# Patient Record
Sex: MALE | Race: BLACK OR AFRICAN AMERICAN | Employment: UNEMPLOYED | URBAN - METROPOLITAN AREA
[De-identification: names, ages, dates, MRNs, and addresses within clinical notes are randomized per-mention and may not be internally consistent; named-entity substitution may affect disease eponyms.]

---

## 2017-01-02 RX ORDER — BLOOD SUGAR DIAGNOSTIC
STRIP MISCELLANEOUS
Qty: 100 STRIP | Refills: 5 | Status: SHIPPED | OUTPATIENT
Start: 2017-01-02 | End: 2018-01-22 | Stop reason: SDUPTHER

## 2017-01-04 ENCOUNTER — OFFICE VISIT (OUTPATIENT)
Dept: INTERNAL MEDICINE CLINIC | Age: 42
End: 2017-01-04

## 2017-01-04 VITALS
BODY MASS INDEX: 44.1 KG/M2 | WEIGHT: 315 LBS | HEIGHT: 71 IN | DIASTOLIC BLOOD PRESSURE: 80 MMHG | SYSTOLIC BLOOD PRESSURE: 120 MMHG | HEART RATE: 102 BPM | TEMPERATURE: 99.3 F | RESPIRATION RATE: 18 BRPM | OXYGEN SATURATION: 98 %

## 2017-01-04 DIAGNOSIS — I10 ESSENTIAL HYPERTENSION: ICD-10-CM

## 2017-01-04 DIAGNOSIS — G47.33 OSA (OBSTRUCTIVE SLEEP APNEA): ICD-10-CM

## 2017-01-04 DIAGNOSIS — E78.5 DYSLIPIDEMIA, GOAL LDL BELOW 100: ICD-10-CM

## 2017-01-04 RX ORDER — DULOXETIN HYDROCHLORIDE 30 MG/1
30 CAPSULE, DELAYED RELEASE ORAL 2 TIMES DAILY
Refills: 0 | COMMUNITY
Start: 2016-12-02 | End: 2017-09-26

## 2017-01-04 RX ORDER — GLIPIZIDE 5 MG/1
5 TABLET, FILM COATED, EXTENDED RELEASE ORAL DAILY
Qty: 30 TAB | Refills: 2 | Status: SHIPPED | OUTPATIENT
Start: 2017-01-04 | End: 2017-03-25 | Stop reason: SDUPTHER

## 2017-01-04 RX ORDER — INSULIN GLARGINE 100 [IU]/ML
INJECTION, SOLUTION SUBCUTANEOUS
Qty: 1 EACH | Refills: 1 | Status: SHIPPED | OUTPATIENT
Start: 2017-01-04 | End: 2017-01-18 | Stop reason: SDUPTHER

## 2017-01-04 NOTE — PROGRESS NOTES
HISTORY OF PRESENT ILLNESS  Aviva Travis is a 46 y.o. male. HPI     Diabetic Review of Systems - medication compliance: compliant all of the time, diabetic diet compliance: compliant most of the time, home glucose monitoring: is performed sporadically, further diabetic ROS: no polyuria or polydipsia, no chest pain, dyspnea or TIA's, no numbness, tingling or pain in extremities. Other symptoms and concerns: His A1C was 11.6% on 9/16/16. Pt states that he does not eat throughout the day and then eats one large meal. Reports he mainly eats chicken and vegetables although also eats carbohydrates. He is concerned about taking insulin due to the possibility of his sugar dropping low with exercising. Reports chronic back pain affects his ability to exercise. Pt states that he needs to get his own CPAP machine and needs to make an appointment for this. Hypertension ROS: taking medications as instructed, no medication side effects noted,   no TIA's, no chest pain on exertion, no dyspnea on exertion, no swelling of ankles. New concerns: Bp was 157/99 in the office today. Pt states that when he sees Dr. Derrell Mendez his bp is better around 122/88. Pt believes his bp is elevated today due to the anxiety of coming to the office today. Hyperlipidemia:  Cardiovascular risk analysis - 46 y.o. male LDL goal is under 100. ROS: taking medications as instructed, no medication side effects noted, no TIA's, no chest pain on exertion, no dyspnea on exertion, no swelling of ankles. Tolerating meds, no myalgias or other side effects noted  New concerns: Stable. Pt states that if he forgets to take Zocor at night he takes this medication in the morning. Reports chronic back pain affects his ability to exercise and he has trouble wearing the pain patch. Pt is seeing Dr. Derrell Mendez although no ideas to further help pain therefore pt wants to see pain management doctor. Review of Systems   Musculoskeletal: Positive for back pain. All other systems reviewed and are negative. Physical Exam   Constitutional: He is oriented to person, place, and time. He appears well-developed and well-nourished. HENT:   Head: Normocephalic and atraumatic. Right Ear: External ear normal.   Left Ear: External ear normal.   Nose: Nose normal.   Mouth/Throat: Oropharynx is clear and moist.   Eyes: Conjunctivae and EOM are normal.   Neck: Normal range of motion. Neck supple. Carotid bruit is not present. No thyroid mass and no thyromegaly present. Cardiovascular: Normal rate, regular rhythm, normal heart sounds and intact distal pulses. Pulmonary/Chest: Effort normal and breath sounds normal.   Abdominal: Soft. Bowel sounds are normal.   Genitourinary: Testes normal.   Musculoskeletal: Normal range of motion. Neurological: He is alert and oriented to person, place, and time. He has normal strength. No cranial nerve deficit or sensory deficit. Skin: Skin is warm and dry. Psychiatric: He has a normal mood and affect. His behavior is normal. Judgment and thought content normal.   Nursing note and vitals reviewed. ASSESSMENT and PLAN  Sen Gross was seen today for follow-up. Diagnoses and all orders for this visit:    Uncontrolled type 2 diabetes mellitus with complication, without long-term current use of insulin ([address]  I told pt that an A1C of 11.6% requires insulin and his A1C has steadily increased over time. I suggested pt see endocrinologist although he agrees to try Lantus. Pt to begin taking Lantus 20 units. He should then check his sugars every morning with his goal sugar around 150. If his sugars are high after three days (200-300) he should increase Lantus dose by 2 units. He should repeat this. I also increased his Glipizide dose from 2.5 to 5 mg. Pt should follow up with me in two weeks. Advised pt that he needs to eat small frequent meals. He should bring snacks throughout the day that are high in protein such as almonds.      JENNIFER (obstructive sleep apnea)  Advised pt that getting a CPAP machine will help decrease his bp. Dyslipidemia, goal LDL below 100  Stable- Continue current medications. Essential hypertension  His bp is elevated today which he attributes to the anxiety he had about this appointment. His bp runs better at Dr. Kristin Fried office. He should continue current medications. Other orders  -     glipiZIDE SR (GLUCOTROL XL) 5 mg CR tablet; Take 1 Tab by mouth daily.   -     insulin glargine (LANTUS SOLOSTAR) 100 unit/mL (3 mL) pen; 20 units at night    I told pt that he should ask his insurance what pain management doctors are in his network.     lab results and schedule of future lab studies reviewed with patient  reviewed diet, exercise and weight control  reviewed medications and side effects in detail    Written by Zaid Escoto, as dictated by Alix Perkins MD.

## 2017-01-18 ENCOUNTER — OFFICE VISIT (OUTPATIENT)
Dept: INTERNAL MEDICINE CLINIC | Age: 42
End: 2017-01-18

## 2017-01-18 VITALS
BODY MASS INDEX: 44.1 KG/M2 | RESPIRATION RATE: 16 BRPM | DIASTOLIC BLOOD PRESSURE: 89 MMHG | TEMPERATURE: 98.4 F | OXYGEN SATURATION: 98 % | HEART RATE: 113 BPM | SYSTOLIC BLOOD PRESSURE: 144 MMHG | WEIGHT: 315 LBS | HEIGHT: 71 IN

## 2017-01-18 DIAGNOSIS — M54.42 CHRONIC BILATERAL LOW BACK PAIN WITH BILATERAL SCIATICA: ICD-10-CM

## 2017-01-18 DIAGNOSIS — I10 ESSENTIAL HYPERTENSION: ICD-10-CM

## 2017-01-18 DIAGNOSIS — E78.5 DYSLIPIDEMIA, GOAL LDL BELOW 100: ICD-10-CM

## 2017-01-18 DIAGNOSIS — M54.41 CHRONIC BILATERAL LOW BACK PAIN WITH BILATERAL SCIATICA: ICD-10-CM

## 2017-01-18 DIAGNOSIS — G89.29 CHRONIC BILATERAL LOW BACK PAIN WITH BILATERAL SCIATICA: ICD-10-CM

## 2017-01-18 RX ORDER — INSULIN GLARGINE 100 [IU]/ML
INJECTION, SOLUTION SUBCUTANEOUS
Qty: 1 EACH | Refills: 3 | Status: SHIPPED | OUTPATIENT
Start: 2017-01-18 | End: 2017-03-16 | Stop reason: SDUPTHER

## 2017-02-02 ENCOUNTER — TELEPHONE (OUTPATIENT)
Dept: INTERNAL MEDICINE CLINIC | Age: 42
End: 2017-02-02

## 2017-02-02 ENCOUNTER — HOSPITAL ENCOUNTER (OUTPATIENT)
Dept: GENERAL RADIOLOGY | Age: 42
Discharge: HOME | End: 2017-02-02
Payer: COMMERCIAL

## 2017-02-02 DIAGNOSIS — M25.561 PAIN IN RIGHT KNEE: ICD-10-CM

## 2017-02-02 PROCEDURE — 73562 X-RAY EXAM OF KNEE 3: CPT

## 2017-02-02 PROCEDURE — 73564 X-RAY EXAM KNEE 4 OR MORE: CPT

## 2017-02-02 RX ORDER — LANCETS
EACH MISCELLANEOUS
Qty: 1 EACH | Refills: 11 | Status: SHIPPED | OUTPATIENT
Start: 2017-02-02 | End: 2020-02-28 | Stop reason: SDUPTHER

## 2017-02-27 DIAGNOSIS — I10 ESSENTIAL HYPERTENSION WITH GOAL BLOOD PRESSURE LESS THAN 130/80: ICD-10-CM

## 2017-02-27 RX ORDER — LISINOPRIL 40 MG/1
TABLET ORAL
Qty: 30 TAB | Refills: 3 | Status: SHIPPED | OUTPATIENT
Start: 2017-02-27 | End: 2017-06-23 | Stop reason: SDUPTHER

## 2017-02-27 RX ORDER — HYDROCHLOROTHIAZIDE 25 MG/1
TABLET ORAL
Qty: 30 TAB | Refills: 3 | Status: SHIPPED | OUTPATIENT
Start: 2017-02-27 | End: 2017-06-23 | Stop reason: SDUPTHER

## 2017-02-28 ENCOUNTER — OFFICE VISIT (OUTPATIENT)
Dept: INTERNAL MEDICINE CLINIC | Age: 42
End: 2017-02-28

## 2017-02-28 VITALS
HEART RATE: 102 BPM | WEIGHT: 315 LBS | HEIGHT: 71 IN | OXYGEN SATURATION: 98 % | DIASTOLIC BLOOD PRESSURE: 86 MMHG | SYSTOLIC BLOOD PRESSURE: 137 MMHG | RESPIRATION RATE: 18 BRPM | BODY MASS INDEX: 44.1 KG/M2 | TEMPERATURE: 98.3 F

## 2017-02-28 DIAGNOSIS — I10 ESSENTIAL HYPERTENSION: ICD-10-CM

## 2017-02-28 DIAGNOSIS — J01.00 ACUTE NON-RECURRENT MAXILLARY SINUSITIS: Primary | ICD-10-CM

## 2017-02-28 DIAGNOSIS — R06.83 SNORING: ICD-10-CM

## 2017-02-28 DIAGNOSIS — R00.2 PALPITATIONS: ICD-10-CM

## 2017-02-28 RX ORDER — AMOXICILLIN AND CLAVULANATE POTASSIUM 875; 125 MG/1; MG/1
1 TABLET, FILM COATED ORAL EVERY 12 HOURS
Qty: 20 TAB | Refills: 0 | Status: SHIPPED | OUTPATIENT
Start: 2017-02-28 | End: 2017-04-10

## 2017-02-28 NOTE — PROGRESS NOTES
HISTORY OF PRESENT ILLNESS  Javan Asencio is a 46 y.o. male. HPI     Reports his grandmother had double pneumonia in the beginning of 02/2017 and the day after he visited her he developed nasal congestion and productive cough. Pt was not feeling well and this has been going on since. Reports he was having chest pain before and a constant cough although this resolved. He currently has a lot of sinus and nasal congestion. Pt notes that he feels very wiped out and felt faint in the shower yesterday. Pt has taken cough syrup, Mucinex, and Flonase. Reports his mother also has an upper respiratory infection. Diabetic Review of Systems - medication compliance: compliant all of the time, home glucose monitoring: is performed regularly, further diabetic ROS: no polyuria or polydipsia, no chest pain, dyspnea or TIA's, no numbness, tingling or pain in extremities. Other symptoms and concerns: Reports after he eats sugars are close to 200 or over. Pt states that on a good day his sugars are 140. Pt takes 32 units of insulin and continues to take Jardiance. He has had issues with needles. He is moving away from his mother because he needs to take care of himself. Hypertension ROS: taking medications as instructed, no medication side effects noted, no TIA's, no chest pain on exertion, no dyspnea on exertion, no swelling of ankles. New concerns: Stable- bp was 137/86 in the office today. Pt has not yet seen cardiologist and lost the information. Review of Systems   Constitutional: Positive for malaise/fatigue. HENT: Positive for congestion. All other systems reviewed and are negative. Physical Exam   Constitutional: He is oriented to person, place, and time. He appears well-developed and well-nourished. HENT:   Head: Normocephalic and atraumatic.    Right Ear: External ear normal.   Left Ear: External ear normal.   Nose: Nose normal.   Mouth/Throat: Oropharynx is clear and moist.   Eyes: Conjunctivae and EOM are normal.   Neck: Normal range of motion. Neck supple. Carotid bruit is not present. No thyroid mass and no thyromegaly present. Cardiovascular: Normal rate, regular rhythm, normal heart sounds and intact distal pulses. Pulmonary/Chest: Effort normal and breath sounds normal.   Abdominal: Soft. Bowel sounds are normal.   Genitourinary: Testes normal.   Musculoskeletal: Normal range of motion. Neurological: He is alert and oriented to person, place, and time. He has normal strength. No cranial nerve deficit or sensory deficit. Skin: Skin is warm and dry. Psychiatric: He has a normal mood and affect. His behavior is normal. Judgment and thought content normal.   Nursing note and vitals reviewed. ASSESSMENT and PLAN  Jamie De La Torre was seen today for diabetes and nasal congestion. Diagnoses and all orders for this visit:    Acute non-recurrent maxillary sinusitis  Symptoms coming from the sinuses and pt to begin taking Augmentin. Episode of feeling faint in the shower may be due to not feeling well, in hot shower, and having an infection. Advised pt that fluid in ears may have caused this as well. -     amoxicillin-clavulanate (AUGMENTIN) 875-125 mg per tablet; Take 1 Tab by mouth every twelve (12) hours. Uncontrolled type 2 diabetes mellitus with complication, without long-term current use of insulin ([facility])  I increased his insulin dose from 32 to 40 units. If he is still seeing his sugars in the 180's or 200's he should increase insulin dose to 50 units. Pt should discuss issues with needles with his pharmacist as they may be able to recommend another needle. I told pt that I want his sugars to be 180 or less after meals and 120-130 in the morning. He will follow up with me at the end of 03/2017 and we will repeat labs. Essential hypertension  BP is at goal. I do not recommend any change in medications.     Palpitations  I gave pt name of Dr. Audie You again.   -     [facility]    Snoring  Pt needs to ask what sleep specialists are in his network and then once he makes the appointment I can do a referral for him. I gave pt Dr. Christel Thapa contact information.  -     REFERRAL TO SLEEP STUDIES    lab results and schedule of future lab studies reviewed with patient  reviewed diet, exercise and weight control  reviewed medications and side effects in detail     Written by Jorge A Bear, as dictated by Gillian Estrada MD.     Current diagnosis and concerns discussed with pt at length. Understands risks and benefits or current treatment plan and medications and accepts the treatment and medication with any possible risks. Pt asks appropriate questions which were answered. Pt instructed to call with any concerns or problems.

## 2017-03-16 RX ORDER — INSULIN GLARGINE 100 [IU]/ML
INJECTION, SOLUTION SUBCUTANEOUS
Qty: 12 PEN | Refills: 5 | Status: SHIPPED | OUTPATIENT
Start: 2017-03-16 | End: 2017-09-19

## 2017-03-21 ENCOUNTER — TELEPHONE (OUTPATIENT)
Dept: INTERNAL MEDICINE CLINIC | Age: 42
End: 2017-03-21

## 2017-03-21 DIAGNOSIS — G47.30 SLEEP APNEA, UNSPECIFIED TYPE: Primary | ICD-10-CM

## 2017-03-21 NOTE — TELEPHONE ENCOUNTER
Patient Referral      Dr. José  [address] [address]  (V)[phone] (Z)[phone]    4/10/17 - Sleep Apnea      [employer] [facility]) -  [phone]

## 2017-03-25 RX ORDER — DILTIAZEM HYDROCHLORIDE 240 MG/1
CAPSULE, COATED, EXTENDED RELEASE ORAL
Qty: 30 CAP | Refills: 5 | Status: SHIPPED | OUTPATIENT
Start: 2017-03-25 | End: 2017-09-08 | Stop reason: SDUPTHER

## 2017-03-25 RX ORDER — GLIPIZIDE 5 MG/1
TABLET, FILM COATED, EXTENDED RELEASE ORAL
Qty: 30 TAB | Refills: 2 | Status: SHIPPED | OUTPATIENT
Start: 2017-03-25 | End: 2017-06-16 | Stop reason: SDUPTHER

## 2017-04-04 ENCOUNTER — TELEPHONE (OUTPATIENT)
Dept: INTERNAL MEDICINE CLINIC | Age: 42
End: 2017-04-04

## 2017-04-05 ENCOUNTER — TELEPHONE (OUTPATIENT)
Dept: SLEEP MEDICINE | Age: 42
End: 2017-04-05

## 2017-04-05 ENCOUNTER — DOCUMENTATION ONLY (OUTPATIENT)
Dept: SLEEP MEDICINE | Age: 42
End: 2017-04-05

## 2017-04-05 NOTE — TELEPHONE ENCOUNTER
Office called requesting the referral again. Did contact the office and spoke with Kenzie Braxton  and advised that per [place] there is no referral required as long as  is In-Network with policy and did verify that Dr Jovani Echeverria is in network.

## 2017-04-05 NOTE — PROGRESS NOTES
Contacted patients PCP office Dr. Puma Louis, requested PCP and insurance referral for appointment  On 4/10/2017.

## 2017-04-10 ENCOUNTER — OFFICE VISIT (OUTPATIENT)
Dept: SLEEP MEDICINE | Age: 42
End: 2017-04-10

## 2017-04-10 VITALS
HEART RATE: 111 BPM | SYSTOLIC BLOOD PRESSURE: 170 MMHG | HEIGHT: 71 IN | WEIGHT: 315 LBS | BODY MASS INDEX: 44.1 KG/M2 | DIASTOLIC BLOOD PRESSURE: 100 MMHG | OXYGEN SATURATION: 97 %

## 2017-04-10 DIAGNOSIS — G47.33 OSA (OBSTRUCTIVE SLEEP APNEA): Primary | ICD-10-CM

## 2017-04-10 DIAGNOSIS — R06.89 HYPOVENTILATION: ICD-10-CM

## 2017-04-10 DIAGNOSIS — I10 ESSENTIAL HYPERTENSION: ICD-10-CM

## 2017-04-10 NOTE — MR AVS SNAPSHOT
Visit Information Date & Time Provider Department Dept. Phone Encounter #  
 4/10/2017 11:00 AM Shreya Plaza MD [facility] 53 [id number] Follow-up Instructions Return if symptoms worsen or fail to improve. Follow-up and Disposition History Your Appointments 4/14/2017 10:30 AM  
ROUTINE CARE with Ronnell Tolbert MD  
[facility] [address] [facility]) Appt Note: 1 mo; 1 mo  
 [address] [facility] [address]  
  
   
 [phone]  
  
    
 4/18/2017  9:00 AM  
New Patient with Ivonne Pruitt MD  
[facility] ([address]) Appt Note: ref by Dr. Denise Mills/Nick palpitation/ ref and record in 400 [facility] 3/21 [facility] 354 [facility] 600 1007 [id number] [place] [address] Upcoming Health Maintenance Date Due DTaP/Tdap/Td series (1 - Tdap) 1/10/2012 EYE EXAM RETINAL OR DILATED Q1 11/3/2015 FOOT EXAM Q1 3/17/2017 MICROALBUMIN Q1 3/17/2017 HEMOGLOBIN A1C Q6M 6/22/2017 LIPID PANEL Q1 12/22/2017 Allergies as of 4/10/2017  Review Complete On: 4/10/2017 By: Shreya Plaza MD  
  
 Severity Noted Reaction Type Reactions Latex  03/17/2016    Hives Clindamycin-benzoyl Peroxide  02/03/2014    Rash Metformin  03/18/2010    Diarrhea Current Immunizations  Reviewed on 1/12/2012 Name Date Influenza High Dose Vaccine PF 9/9/2016 Influenza Vaccine 10/1/2015 Influenza Vaccine Split 10/28/2011 TD Vaccine 1/9/2012 Not reviewed this visit You Were Diagnosed With   
  
 Codes Comments JENNIFER (obstructive sleep apnea)    -  Primary ICD-10-CM: G47.33 
ICD-9-CM: 327.23  BMI 50.0-59.9, adult [facility])     ICD-10-CM: B44.97 
ICD-9-CM: V85.43   
 Essential hypertension     ICD-10-CM: I10 
ICD-9-CM: 401.9 Hypoventilation     ICD-10-CM: R06.89 
ICD-9-CM: 786.09 Vitals BP Pulse Height(growth percentile) Weight(growth percentile) SpO2 BMI  
 (!) 170/100 (!) 111 5' 11\" (1.803 m) (!) 379 lb 12.8 oz (172.3 kg) 97% 52.97 kg/m2 Smoking Status Never Smoker Vitals History BMI and BSA Data Body Mass Index Body Surface Area 52.97 kg/m 2 2.94 m 2 Preferred Pharmacy Pharmacy Name Phone [phone] [address] [phone] Your Updated Medication List  
  
   
This list is accurate as of: 4/10/17 11:00 AM.  Always use your most recent med list.  
  
  
  
  
 albuterol 90 mcg/actuation inhaler Commonly known as:  PROAIR HFA Take 1 Puff by inhalation every four (4) hours as needed for Wheezing or Shortness of Breath. Blood-Glucose Meter monitoring kit Pt needs One Touch Ultra glucometer to test blood sugars three times daily E11.9  
  
 cyclobenzaprine 10 mg tablet Commonly known as:  FLEXERIL Take  by mouth three (3) times daily as needed for Muscle Spasm(s). dilTIAZem  mg ER capsule Commonly known as:  CARDIZEM CD  
take 1 capsule by mouth once daily DULoxetine 30 mg capsule Commonly known as:  CYMBALTA Take 30 mg by mouth two (2) times a day. empagliflozin 25 mg tablet Commonly known as:  Sekourey Eis Take 1 Tab by mouth daily for 30 days. ergocalciferol 50,000 unit capsule Commonly known as:  ERGOCALCIFEROL  
take 1 capsule by mouth every week  
  
 fentaNYL 50 mcg/hr PATCH Commonly known as:  DURAGESIC  
1 Patch by TransDERmal route every seventy-two (72) hours. fexofenadine 180 mg tablet Commonly known as:  Dorinda Ken Take  by mouth. glipiZIDE SR 5 mg CR tablet Commonly known as:  GLUCOTROL XL  
take 1 tablet by mouth once daily  
  
 hydroCHLOROthiazide 25 mg tablet Commonly known as:  HYDRODIURIL  
take 1 tablet by mouth once daily Lancets Misc Pt request One Touch Delica to test blood sugars three time daily LANTUS SOLOSTAR 100 unit/mL (3 mL) pen Generic drug:  insulin glargine INJECT 40 UNITS SUBCUTANEOUSLY ONCE NIGHTLY  
  
 lisinopril 40 mg tablet Commonly known as:  PRINIVIL, ZESTRIL  
take 1 tablet by mouth once daily  
  
 meloxicam 7.5 mg tablet Commonly known as:  MOBIC Take  by mouth daily. omeprazole 20 mg capsule Commonly known as:  PRILOSEC  
take 1 capsule by mouth once daily ONETOUCH ULTRA TEST strip Generic drug:  glucose blood VI test strips TEST three times a day  
  
 simvastatin 20 mg tablet Commonly known as:  ZOCOR  
take 1 tablet by mouth NIGHTLY  
  
 TRADJENTA 5 mg tablet Generic drug:  linagliptin TAKE 1 TABLET BY MOUTH DAILY  
  
 traMADol 50 mg tablet Commonly known as:  ULTRAM  
Take 50 mg by mouth four (4) times daily. Follow-up Instructions Return if symptoms worsen or fail to improve. To-Do List   
 04/10/2017 Sleep Center:  POLYSOMNOGRAPHY 1 NIGHT Patient Instructions 217 [facility], [place]. 1668 [facility], [facility] Tel.  [phone] Fax. 100 [facility] [address] Tel.  [phone] Fax. [phone] [address] [address] Tel.  [phone] Fax. [phone] Sleep Apnea: After Your Visit Your Care Instructions Sleep apnea occurs when you frequently stop breathing for 10 seconds or longer during sleep. It can be mild to severe, based on the number of times per hour that you stop breathing or have slowed breathing. Blocked or narrowed airways in your nose, mouth, or throat can cause sleep apnea. Your airway can become blocked when your throat muscles and tongue relax during sleep. Sleep apnea is common, occurring in 1 out of 20 individuals.   Individuals having any of the following characteristics should be evaluated and treated right away due to high risk and detrimental consequences from untreated sleep apnea: 
1. Obesity 2. Congestive Heart failure 3. Atrial Fibrillation 4. Uncontrolled Hypertension 5. Type II Diabetes 6. Night-time Arrhythmias 7. Stroke 8. Pulmonary Hypertension 9. High-risk Driving Populations (pilots, truck drivers, etc.) 10. Patients Considering Weight-loss Surgery How do you know you have sleep apnea? You probably have sleep apnea if you answer 'yes' to 3 or more of the following questions: S - Have you been told that you Snore? T - Are you often Tired during the day? O - Has anyone Observed you stop breathing while sleeping? P- Do you have (or are being treated for) high blood Pressure? B - Are you obese (Body Mass Index > 35)? A - Is your Age 48years old or older? N - Is your Neck size greater than 16 inches? G - Are you male Gender? A sleep physician can prescribe a breathing device that prevents tissues in the throat from blocking your airway. Or your doctor may recommend using a dental device (oral breathing device) to help keep your airway open. In some cases, surgery may be needed to remove enlarged tissues in the throat. Follow-up care is a key part of your treatment and safety. Be sure to make and go to all appointments, and call your doctor if you are having problems. It's also a good idea to know your test results and keep a list of the medicines you take. How can you care for yourself at home? · Lose weight, if needed. It may reduce the number of times you stop breathing or have slowed breathing. · Go to bed at the same time every night. · Sleep on your side. It may stop mild apnea. If you tend to roll onto your back, sew a pocket in the back of your pajama top. Put a tennis ball into the pocket, and stitch the pocket shut. This will help keep you from sleeping on your back. · Avoid alcohol and medicines such as sleeping pills and sedatives before bed. · Do not smoke. Smoking can make sleep apnea worse. If you need help quitting, talk to your doctor about stop-smoking programs and medicines. These can increase your chances of quitting for good. · Prop up the head of your bed 4 to 6 inches by putting bricks under the legs of the bed. · Treat breathing problems, such as a stuffy nose, caused by a cold or allergies. · Use a continuous positive airway pressure (CPAP) breathing machine if lifestyle changes do not help your apnea and your doctor recommends it. The machine keeps your airway from closing when you sleep. · If CPAP does not help you, ask your doctor whether you should try other breathing machines. A bilevel positive airway pressure machine has two types of air pressureâone for breathing in and one for breathing out. Another device raises or lowers air pressure as needed while you breathe. · If your nose feels dry or bleeds when using one of these machines, talk with your doctor about increasing moisture in the air. A humidifier may help. · If your nose is runny or stuffy from using a breathing machine, talk with your doctor about using decongestants or a corticosteroid nasal spray. When should you call for help? Watch closely for changes in your health, and be sure to contact your doctor if: 
· You still have sleep apnea even though you have made lifestyle changes. · You are thinking of trying a device such as CPAP. · You are having problems using a CPAP or similar machine. Where can you learn more? Go to KeepFu. Enter P227 in the search box to learn more about \"Sleep Apnea: After Your Visit. \"  
© 0507-7361 [employer]. Care instructions adapted under license by Amy Erickson (which disclaims liability or warranty for this information).  This care instruction is for use with your licensed healthcare professional. If you have questions about a medical condition or this instruction, always ask your healthcare professional. Phu Vieyra any warranty or liability for your use of this information. PROPER SLEEP HYGIENE What to avoid · Do not have drinks with caffeine, such as coffee or black tea, for 8 hours before bed. · Do not smoke or use other types of tobacco near bedtime. Nicotine is a stimulant and can keep you awake. · Avoid drinking alcohol late in the evening, because it can cause you to wake in the middle of the night. · Do not eat a big meal close to bedtime. If you are hungry, eat a light snack. · Do not drink a lot of water close to bedtime, because the need to urinate may wake you up during the night. · Do not read or watch TV in bed. Use the bed only for sleeping and sexual activity. What to try · Go to bed at the same time every night, and wake up at the same time every morning. Do not take naps during the day. · Keep your bedroom quiet, dark, and cool. · Get regular exercise, but not within 3 to 4 hours of your bedtime. Tiffanie Waldrop · Sleep on a comfortable pillow and mattress. · If watching the clock makes you anxious, turn it facing away from you so you cannot see the time. · If you worry when you lie down, start a worry book. Well before bedtime, write down your worries, and then set the book and your concerns aside. · Try meditation or other relaxation techniques before you go to bed. · If you cannot fall asleep, get up and go to another room until you feel sleepy. Do something relaxing. Repeat your bedtime routine before you go to bed again. · Make your house quiet and calm about an hour before bedtime. Turn down the lights, turn off the TV, log off the computer, and turn down the volume on music. This can help you relax after a busy day. Drowsy Driving [employer] cites drowsiness as a causing factor in more than 059,955 police reported crashes annually, resulting in 76,000 injuries and 1,500 deaths. Other surveys suggest 55% of people polled have driven while drowsy in the past year, 23% had fallen asleep but not crashed, 3% crashed, and 2% had and accident due to drowsy driving. Who is at risk? Young Drivers: One study of drowsy driving accidents states that 55% of the drivers were under 25 years. Of those, 75% were male. Shift Workers and Travelers: People who work overnight or travel across time zones frequently are at higher risk of experiencing Circadian Rhythm Disorders. They are trying to work and function when their body is programed to sleep. Sleep Deprived: Lack of sleep has a serious impact on your ability to pay attention or focus on a task. Consistently getting less than the average of 8 hours your body needs creates partial or cumulative sleep deprivation. Untreated Sleep Disorders: Sleep Apnea, Narcolepsy, R.L.S., and other sleep disorders (untreated) prevent a person from getting enough restful sleep. This leads to excessive daytime sleepiness and increases the risk for drowsy driving accidents by up to 7 times. Medications / Alcohol: Even over the counter medications can cause drowsiness. Medications that impair a drivers attention should have a warning label. Alcohol naturally makes you sleepy and on its own can cause accidents. Combined with excessive drowsiness its effects are amplified. Signs of Drowsy Driving: * You don't remember driving the last few miles * You may drift out of your trinh * You are unable to focus and your thoughts wander * You may yawn more often than normal 
 * You have difficulty keeping your eyes open / nodding off * Missing traffic signs, speeding, or tailgating Prevention-  
Good sleep hygiene, lifestyle and behavioral choices have the most impact on drowsy driving.  There is no substitute for sleep and the average person requires 8 hours nightly. If you find yourself driving drowsy, stop and sleep. Consider the sleep hygiene tips provided during your visit as well. Medication Refill Policy: Refills for all medications require 1 week advance notice. Please have your pharmacy fax a refill request. We are unable to fax, or call in \"controled substance\" medications and you will need to pick these prescriptions up from our office. [employer] Activation Thank you for requesting access to [employer]. Please follow the instructions below to securely access and download your online medical record. [employer] allows you to send messages to your doctor, view your test results, renew your prescriptions, schedule appointments, and more. How Do I Sign Up? 1. In your internet browser, go to [email or web address]. 2. Click on the First Time User? Click Here link in the Sign In box. You will see the New Member Sign Up page. 3. Enter your [employer] Access Code exactly as it appears below. You will not need to use this code after youve completed the sign-up process. If you do not sign up before the expiration date, you must request a new code. [employer] Access Code: [email or web address] Expires: 5/3/2017  2:35 PM (This is the date your [employer] access code will ) 4. Enter the last four digits of your Social Security Number (xxxx) and Date of Birth (mm/dd/yyyy) as indicated and click Submit. You will be taken to the next sign-up page. 5. Create a Crowd[employer] ID. This will be your [employer] login ID and cannot be changed, so think of one that is secure and easy to remember. 6. Create a [employer] password. You can change your password at any time. 7. Enter your Password Reset Question and Answer. This can be used at a later time if you forget your password. 8. Enter your e-mail address. You will receive e-mail notification when new information is available in [address] 9. Click Sign Up. You can now view and download portions of your medical record. 10. Click the Download Summary menu link to download a portable copy of your medical information. Additional Information If you have questions, please call [phone]. Remember, Lively is NOT to be used for urgent needs. For medical emergencies, dial 911. Introducing [facility]! Rocío Sandra introduces Lively patient portal. Now you can access parts of your medical record, email your doctor's office, and request medication refills online. 1. In your internet browser, go to [email or web address] 2. Click on the First Time User? Click Here link in the Sign In box. You will see the New Member Sign Up page. 3. Enter your Lively Access Code exactly as it appears below. You will not need to use this code after youve completed the sign-up process. If you do not sign up before the expiration date, you must request a new code. · Lively Access Code: [email or web address] Expires: 5/3/2017  2:35 PM 
 
4. Enter the last four digits of your Social Security Number (xxxx) and Date of Birth (mm/dd/yyyy) as indicated and click Submit. You will be taken to the next sign-up page. 5. Create a Lively ID. This will be your Lively login ID and cannot be changed, so think of one that is secure and easy to remember. 6. Create a Lively password. You can change your password at any time. 7. Enter your Password Reset Question and Answer. This can be used at a later time if you forget your password. 8. Enter your e-mail address. You will receive e-mail notification when new information is available in [address] 9. Click Sign Up. You can now view and download portions of your medical record. 10. Click the Download Summary menu link to download a portable copy of your medical information.  
 
If you have questions, please visit the Frequently Asked Questions section of the [employer]. Remember, Whatâ€™s On Foodie[employer] is NOT to be used for urgent needs. For medical emergencies, dial 911. Now available from your iPhone and Android! Please provide this summary of care documentation to your next provider. Your primary care clinician is listed as Eduardo Officer. If you have any questions after today's visit, please call [phone].

## 2017-04-10 NOTE — PROGRESS NOTES
217 [facility] [place]. [place], [address]  Tel.  [phone]  Fax. 100 [facility] 60  [place], 200 [address] [facility]  Tel.  [phone]  Fax. [phone] [facility] [address]  Tel.  [phone]  Fax. [phone]         Subjective:      Liliya Crowell is an 46 y.o. male referred for evaluation for a sleep disorder. He complains of snoring associated with snorting, choking, periods of not breathing, kicking. Symptoms began several years ago initially resolved with weight loss but have gradually worsening since that time due to weight gain. He usually can fall asleep in 5 minutes when tired. Family or house members note snoring, periods of not breathing. He denies completely or partially paralyzed while falling asleep or waking up. Liliya Crowell does wake up frequently at night. He is bothered by waking up too early and left unable to get back to sleep. He actually sleeps about 4 hours at night and wakes up about 6 times during the night. He does not work shifts: Nacho Larson indicates he does get too little sleep at night. His bedtime is 0230. He awakens at 0830. He does not take naps. Nightmares. Other remarks: waking with a gasp or snort, vivid dreams, hallucinations.     Corbin Score: 5    Allergies   Allergen Reactions    Latex Hives    Clindamycin-Benzoyl Peroxide Rash    Metformin Diarrhea         Current Outpatient Prescriptions:     dilTIAZem CD (CARDIZEM CD) 240 mg ER capsule, take 1 capsule by mouth once daily, Disp: 30 Cap, Rfl: 5    glipiZIDE SR (GLUCOTROL XL) 5 mg CR tablet, take 1 tablet by mouth once daily, Disp: 30 Tab, Rfl: 2    LANTUS SOLOSTAR 100 unit/mL (3 mL) pen, INJECT 40 UNITS SUBCUTANEOUSLY ONCE NIGHTLY, Disp: 12 Pen, Rfl: 5    hydroCHLOROthiazide (HYDRODIURIL) 25 mg tablet, take 1 tablet by mouth once daily, Disp: 30 Tab, Rfl: 3    lisinopril (PRINIVIL, ZESTRIL) 40 mg tablet, take 1 tablet by mouth once daily, Disp: 30 Tab, Rfl: 3    Lancets misc, Pt request One Touch Delica to test blood sugars three time daily, Disp: 1 Each, Rfl: 11    DULoxetine (CYMBALTA) 30 mg capsule, Take 30 mg by mouth two (2) times a day., Disp: , Rfl: 0    ONETOUCH ULTRA TEST strip, TEST three times a day, Disp: 100 Strip, Rfl: 5    Blood-Glucose Meter monitoring kit, Pt needs One Touch Ultra glucometer to test blood sugars three times daily E11.9, Disp: 1 Kit, Rfl: 0    TRADJENTA 5 mg tablet, TAKE 1 TABLET BY MOUTH DAILY, Disp: 30 Tab, Rfl: 3    omeprazole (PRILOSEC) 20 mg capsule, take 1 capsule by mouth once daily, Disp: 30 Cap, Rfl: 5    simvastatin (ZOCOR) 20 mg tablet, take 1 tablet by mouth NIGHTLY, Disp: 30 Tab, Rfl: 5    ergocalciferol (ERGOCALCIFEROL) 50,000 unit capsule, take 1 capsule by mouth every week, Disp: , Rfl: 0    albuterol (PROAIR HFA) 90 mcg/actuation inhaler, Take 1 Puff by inhalation every four (4) hours as needed for Wheezing or Shortness of Breath., Disp: 1 Inhaler, Rfl: 1    fentaNYL (DURAGESIC) 50 mcg/hr PATCH, 1 Patch by TransDERmal route every seventy-two (72) hours. , Disp: , Rfl:     fexofenadine (ALLEGRA) 180 mg tablet, Take  by mouth., Disp: , Rfl:     cyclobenzaprine (FLEXERIL) 10 mg tablet, Take  by mouth three (3) times daily as needed for Muscle Spasm(s). , Disp: , Rfl:     tramadol (ULTRAM) 50 mg tablet, Take 50 mg by mouth four (4) times daily. , Disp: , Rfl:     meloxicam (MOBIC) 7.5 mg tablet, Take  by mouth daily. , Disp: , Rfl:     empagliflozin (JARDIANCE) 25 mg tablet, Take 1 Tab by mouth daily for 30 days. , Disp: 30 Tab, Rfl: 3     He  has a past medical history of Arthritis; Asthma; Chronic back pain (4/11/2011); Depression; Diabetes ([facility] [address] JENNIFER (obstructive sleep apnea) (4/12/2012); and Psychotic disorder. He  has a past surgical history that includes ir inj epidural lumbar sacral wo img.     He family history includes Arthritis-osteo in his mother; Cancer in his mother; Diabetes in his mother; Heart Disease in his mother; Hypertension in his father. He  reports that he has never smoked. He has never used smokeless tobacco. He reports that he does not drink alcohol or use illicit drugs. Review of Systems:  Constitutional:  No significant weight loss or weight gain in recent years  Eyes:  No blurred vision  CVS:  No significant chest pain  Pulm:  No significant shortness of breath  GI:  No significant nausea or vomiting  :  No significant nocturia  Musculoskeletal:  No significant joint pain at night  Skin:  No significant rashes  Neuro:  No significant dizziness   Psych:  No active mood issues    Sleep Review of Systems: notable for difficulty falling asleep; frequent awakenings at night;  regular dreaming noted; occasional nightmares ; no early morning headaches; no memory problems; no concentration issues; no history of any automobile or occupational accidents due to daytime drowsiness. Objective:     Visit Vitals    BP (!) 170/100    Pulse (!) 111    Ht 5' 11\" (1.803 m)    Wt (!) 379 lb 12.8 oz (172.3 kg)    SpO2 97%    BMI 52.97 kg/m2         General:   Not in acute distress   Eyes:  Anicteric sclerae, no obvious strabismus   Nose:  No obvious nasal septum deviation    Oropharynx:   Class 4 oropharyngeal outlet, thick tongue base, uvula could not be seen due to low-lying soft palate, narrow tonsilo-pharyngeal pilars   Tonsils:   tonsils are not seen due to low-lying soft palate   Neck:   Neck circ. in \"inches\": 20; midline trachea   Chest/Lungs:  Equal lung expansion, clear on auscultation    CVS:  Normal rate, regular rhythm; no JVD   Skin:  Warm to touch; no obvious rashes   Neuro:  No focal deficits ; no obvious tremor    Psych:  Normal affect,  normal countenance;          Assessment:       ICD-10-CM ICD-9-CM    1. JENNIFER (obstructive sleep apnea) G47.33 327.23 POLYSOMNOGRAPHY 1 NIGHT   2. BMI 50.0-59.9, adult ([facility] [address] Z68.43 V85.43    3.  Essential hypertension I10 401.9    4. Hypoventilation R06.89 786.09 POLYSOMNOGRAPHY 1 NIGHT         Plan:     * The patient currently has a High Risk for having sleep apnea. STOP-BANG score 7.  * Sleep testing was ordered for initial evaluation. * He was provided information on sleep apnea including coresponding risk factors and the importance of proper treatment. * Treatment options if indicated were reviewed today. Patient agrees to a trial of PAP therapy if indicated. * Counseling was provided regarding proper sleep hygiene (including effect of light on sleep) and safe driving. * Effect of sleep disturbance on weight was reviewed. We have recommended a dedicated weight loss through appropriate diet and an exercise regiment as significant weight reduction has been shown to reduce severity of obstructive sleep apnea. * Telephone [phone]  follow-up shortly after sleep study to review results and plan final management.     (patient has given permission for a message to be left regarding test results and further management if patient cannot be cannot be reached directly). Thank you for allowing us to participate in your patient's medical care. We'll keep you updated on these investigations. Flores Goldman MD, FAASM  Electronically signed.  April 10, 2017

## 2017-04-10 NOTE — PATIENT INSTRUCTIONS
217 [facility] [place]. [place], [address]  Tel.  [phone]  Fax. 100 [facility] 60  [place], [address] [facility]  Tel.  [phone]  Fax. [phone] [address] [address] [address]  Tel.  [phone]  Fax. [phone]     Sleep Apnea: After Your Visit  Your Care Instructions  Sleep apnea occurs when you frequently stop breathing for 10 seconds or longer during sleep. It can be mild to severe, based on the number of times per hour that you stop breathing or have slowed breathing. Blocked or narrowed airways in your nose, mouth, or throat can cause sleep apnea. Your airway can become blocked when your throat muscles and tongue relax during sleep. Sleep apnea is common, occurring in 1 out of 20 individuals. Individuals having any of the following characteristics should be evaluated and treated right away due to high risk and detrimental consequences from untreated sleep apnea:  1. Obesity  2. Congestive Heart failure  3. Atrial Fibrillation  4. Uncontrolled Hypertension  5. Type II Diabetes  6. Night-time Arrhythmias  7. Stroke  8. Pulmonary Hypertension  9. High-risk Driving Populations (pilots, truck drivers, etc.)  10. Patients Considering Weight-loss Surgery    How do you know you have sleep apnea? You probably have sleep apnea if you answer 'yes' to 3 or more of the following questions:  S - Have you been told that you Snore? T - Are you often Tired during the day? O - Has anyone Observed you stop breathing while sleeping? P- Do you have (or are being treated for) high blood Pressure? B - Are you obese (Body Mass Index > 35)? A - Is your Age 48years old or older? N - Is your Neck size greater than 16 inches? G - Are you male Gender? A sleep physician can prescribe a breathing device that prevents tissues in the throat from blocking your airway.  Or your doctor may recommend using a dental device (oral breathing device) to help keep your airway open. In some cases, surgery may be needed to remove enlarged tissues in the throat. Follow-up care is a key part of your treatment and safety. Be sure to make and go to all appointments, and call your doctor if you are having problems. It's also a good idea to know your test results and keep a list of the medicines you take. How can you care for yourself at home? · Lose weight, if needed. It may reduce the number of times you stop breathing or have slowed breathing. · Go to bed at the same time every night. · Sleep on your side. It may stop mild apnea. If you tend to roll onto your back, sew a pocket in the back of your pajama top. Put a tennis ball into the pocket, and stitch the pocket shut. This will help keep you from sleeping on your back. · Avoid alcohol and medicines such as sleeping pills and sedatives before bed. · Do not smoke. Smoking can make sleep apnea worse. If you need help quitting, talk to your doctor about stop-smoking programs and medicines. These can increase your chances of quitting for good. · Prop up the head of your bed 4 to 6 inches by putting bricks under the legs of the bed. · Treat breathing problems, such as a stuffy nose, caused by a cold or allergies. · Use a continuous positive airway pressure (CPAP) breathing machine if lifestyle changes do not help your apnea and your doctor recommends it. The machine keeps your airway from closing when you sleep. · If CPAP does not help you, ask your doctor whether you should try other breathing machines. A bilevel positive airway pressure machine has two types of air pressureâone for breathing in and one for breathing out. Another device raises or lowers air pressure as needed while you breathe. · If your nose feels dry or bleeds when using one of these machines, talk with your doctor about increasing moisture in the air. A humidifier may help.   · If your nose is runny or stuffy from using a breathing machine, talk with your doctor about using decongestants or a corticosteroid nasal spray. When should you call for help? Watch closely for changes in your health, and be sure to contact your doctor if:  · You still have sleep apnea even though you have made lifestyle changes. · You are thinking of trying a device such as CPAP. · You are having problems using a CPAP or similar machine. Where can you learn more? Go to [employer]. Enter T260 in the search box to learn more about \"Sleep Apnea: After Your Visit. \"   © 0612-2229 Healthwise, [employer]. Care instructions adapted under license by Valentino Bhatti (which disclaims liability or warranty for this information). This care instruction is for use with your licensed healthcare professional. If you have questions about a medical condition or this instruction, always ask your healthcare professional. Roberto Carlos Altamiranoss any warranty or liability for your use of this information. PROPER SLEEP HYGIENE    What to avoid  · Do not have drinks with caffeine, such as coffee or black tea, for 8 hours before bed. · Do not smoke or use other types of tobacco near bedtime. Nicotine is a stimulant and can keep you awake. · Avoid drinking alcohol late in the evening, because it can cause you to wake in the middle of the night. · Do not eat a big meal close to bedtime. If you are hungry, eat a light snack. · Do not drink a lot of water close to bedtime, because the need to urinate may wake you up during the night. · Do not read or watch TV in bed. Use the bed only for sleeping and sexual activity. What to try  · Go to bed at the same time every night, and wake up at the same time every morning. Do not take naps during the day. · Keep your bedroom quiet, dark, and cool. · Get regular exercise, but not within 3 to 4 hours of your bedtime. .  · Sleep on a comfortable pillow and mattress.   · If watching the clock makes you anxious, turn it facing away from you so you cannot see the time. · If you worry when you lie down, start a worry book. Well before bedtime, write down your worries, and then set the book and your concerns aside. · Try meditation or other relaxation techniques before you go to bed. · If you cannot fall asleep, get up and go to another room until you feel sleepy. Do something relaxing. Repeat your bedtime routine before you go to bed again. · Make your house quiet and calm about an hour before bedtime. Turn down the lights, turn off the TV, log off the computer, and turn down the volume on music. This can help you relax after a busy day. Drowsy Driving  The [address] [place] [address] Road Traffic Safety Administration cites drowsiness as a causing factor in more than 851,700 police reported crashes annually, resulting in 76,000 injuries and 1,500 deaths. Other surveys suggest 55% of people polled have driven while drowsy in the past year, 23% had fallen asleep but not crashed, 3% crashed, and 2% had and accident due to drowsy driving. Who is at risk? Young Drivers: One study of drowsy driving accidents states that 55% of the drivers were under 25 years. Of those, 75% were male. Shift Workers and Travelers: People who work overnight or travel across time zones frequently are at higher risk of experiencing Circadian Rhythm Disorders. They are trying to work and function when their body is programed to sleep. Sleep Deprived: Lack of sleep has a serious impact on your ability to pay attention or focus on a task. Consistently getting less than the average of 8 hours your body needs creates partial or cumulative sleep deprivation. Untreated Sleep Disorders: Sleep Apnea, Narcolepsy, R.L.S., and other sleep disorders (untreated) prevent a person from getting enough restful sleep. This leads to excessive daytime sleepiness and increases the risk for drowsy driving accidents by up to 7 times.   Medications / Alcohol: Even over the counter medications can cause drowsiness. Medications that impair a drivers attention should have a warning label. Alcohol naturally makes you sleepy and on its own can cause accidents. Combined with excessive drowsiness its effects are amplified. Signs of Drowsy Driving:   * You don't remember driving the last few miles   * You may drift out of your trinh   * You are unable to focus and your thoughts wander   * You may yawn more often than normal   * You have difficulty keeping your eyes open / nodding off   * Missing traffic signs, speeding, or tailgating  Prevention-   Good sleep hygiene, lifestyle and behavioral choices have the most impact on drowsy driving. There is no substitute for sleep and the average person requires 8 hours nightly. If you find yourself driving drowsy, stop and sleep. Consider the sleep hygiene tips provided during your visit as well. Medication Refill Policy: Refills for all medications require 1 week advance notice. Please have your pharmacy fax a refill request. We are unable to fax, or call in \"controled substance\" medications and you will need to pick these prescriptions up from our office. [employer] Activation    Thank you for requesting access to [employer]. Please follow the instructions below to securely access and download your online medical record. [employer] allows you to send messages to your doctor, view your test results, renew your prescriptions, schedule appointments, and more. How Do I Sign Up? 1. In your internet browser, go to [email or web address]. 2. Click on the First Time User? Click Here link in the Sign In box. You will see the New Member Sign Up page. 3. Enter your [employer] Access Code exactly as it appears below. You will not need to use this code after youve completed the sign-up process. If you do not sign up before the expiration date, you must request a new code.     [employer] Access Code: [email or web address]  Expires: 5/3/2017  2:35 PM (This is the date your [employer] access code will )    4. Enter the last four digits of your Social Security Number (xxxx) and Date of Birth (mm/dd/yyyy) as indicated and click Submit. You will be taken to the next sign-up page. 5. Create a [email or web address] ID. This will be your [employer] login ID and cannot be changed, so think of one that is secure and easy to remember. 6. Create a [employer] password. You can change your password at any time. 7. Enter your Password Reset Question and Answer. This can be used at a later time if you forget your password. 8. Enter your e-mail address. You will receive e-mail notification when new information is available in [address] 9. Click Sign Up. You can now view and download portions of your medical record. 10. Click the Download Summary menu link to download a portable copy of your medical information. Additional Information    If you have questions, please call [phone]. Remember, [employer] is NOT to be used for urgent needs. For medical emergencies, dial 911.

## 2017-04-14 ENCOUNTER — OFFICE VISIT (OUTPATIENT)
Dept: INTERNAL MEDICINE CLINIC | Age: 42
End: 2017-04-14

## 2017-04-14 VITALS
HEART RATE: 98 BPM | BODY MASS INDEX: 44.1 KG/M2 | TEMPERATURE: 99 F | OXYGEN SATURATION: 98 % | SYSTOLIC BLOOD PRESSURE: 155 MMHG | HEIGHT: 71 IN | DIASTOLIC BLOOD PRESSURE: 103 MMHG | WEIGHT: 315 LBS

## 2017-04-14 DIAGNOSIS — I10 ESSENTIAL HYPERTENSION: ICD-10-CM

## 2017-04-14 DIAGNOSIS — G47.33 OSA (OBSTRUCTIVE SLEEP APNEA): ICD-10-CM

## 2017-04-14 DIAGNOSIS — E66.01 MORBID OBESITY, UNSPECIFIED OBESITY TYPE (HCC): ICD-10-CM

## 2017-04-14 RX ORDER — ATENOLOL 50 MG/1
50 TABLET ORAL DAILY
Qty: 30 TAB | Refills: 3 | Status: SHIPPED | OUTPATIENT
Start: 2017-04-14 | End: 2017-08-03 | Stop reason: SDUPTHER

## 2017-04-14 NOTE — PROGRESS NOTES
HISTORY OF PRESENT ILLNESS  Becky Gross is a 46 y.o. male. HPI     Diabetic Review of Systems - medication compliance: compliant all of the time, home glucose monitoring: is performed regularly, further diabetic ROS: no polyuria or polydipsia, no chest pain, dyspnea or TIA's, no numbness, tingling or pain in extremities. Other symptoms and concerns: Stable. Pt states that when he is being more active his blood sugar drops low and he feels poorly when this occurs. Reports one morning he had a low sugar reading of 62 and felt horrible and it was 150 later that day. He states that the night prior to this he took 40 units and ate a normal meal. He is able to adjust his insulin based on readings. Pt eats about two meals/day because he is busy. Pt does not eat fast food. Hypertension ROS: taking medications as instructed, no medication side effects noted, no TIA's, no chest pain on exertion, no dyspnea on exertion, no swelling of ankles. New concerns: Bp was 155/103 in the office today. Pt saw Dr. Hai Jean and is scheduled to have sleep study performed. He has appointment with Dr. Edin Finley on 4/18/17. Pt not taking a baby aspirin. Review of Systems   All other systems reviewed and are negative. Physical Exam   Constitutional: He is oriented to person, place, and time. He appears well-developed and well-nourished. HENT:   Head: Normocephalic and atraumatic. Right Ear: External ear normal.   Left Ear: External ear normal.   Nose: Nose normal.   Mouth/Throat: Oropharynx is clear and moist.   Eyes: Conjunctivae and EOM are normal.   Neck: Normal range of motion. Neck supple. Cardiovascular: Normal rate, regular rhythm, normal heart sounds and intact distal pulses. Pulmonary/Chest: Effort normal and breath sounds normal.   Abdominal: Soft. Bowel sounds are normal.   Genitourinary: Testes normal.   Musculoskeletal: Normal range of motion.    Neurological: He is alert and oriented to person, place, and time. Skin: Skin is warm and dry. Psychiatric: He has a normal mood and affect. His behavior is normal. Judgment and thought content normal.   Nursing note and vitals reviewed. ASSESSMENT and Heddy Primes was seen today for diabetes. Diagnoses and all orders for this visit:    Uncontrolled type 2 diabetes mellitus with complication, without long-term current use of insulin ([address]  Advised pt that his body is used to his sugars being high (average was 286) and needs to adjust to his sugars being at a normal value and this is why he feels so poorly. Encouraged pt to have small frequent meals to help with his sugars. Pt should start making a food and sugar recording log and then meet with Mariangel Rich. He will follow up with me in 3 months.  -     HEMOGLOBIN A1C WITH EAG  -     LIPID PANEL  -     MICROALBUMIN, UR, RAND W/ MICROALBUMIN/CREA RATIO    Essential hypertension  Bp elevated today and I added Atenolol 50 mg to his medication regimen. He will see Dr. Kalie Hernandez next week. Advised pt that when he continues to watch what he is eating and is on CPAP machine I will most likely be able to take away some of his medications. -     atenolol (TENORMIN) 50 mg tablet; Take 1 Tab by mouth daily.  -     METABOLIC PANEL, COMPREHENSIVE    JENNIFER (obstructive sleep apnea)  Pt saw Dr. Deepali Casas and will have sleep study performed. Morbid obesity, unspecified obesity type  Encouraged pt to continue working on eating healthy and this will get better when he moves out. He is using Duoderm patches and states that these patches actually stay on. Pt should ask Dr. Bienvenido Mello for prescription for these patches. Pt needs to begin taking baby aspirin daily.     lab results and schedule of future lab studies reviewed with patient  reviewed diet, exercise and weight control  reviewed medications and side effects in detail     Written by Mohan Curtis, as dictated by Noy Bonner MD.     Current diagnosis and concerns discussed with pt at length. Understands risks and benefits or current treatment plan and medications and accepts the treatment and medication with any possible risks. Pt asks appropriate questions which were answered. Pt instructed to call with any concerns or problems.

## 2017-04-15 LAB
ALBUMIN SERPL-MCNC: 4.3 G/DL (ref 3.5–5.5)
ALBUMIN/CREAT UR: 8.1 MG/G CREAT (ref 0–30)
ALBUMIN/GLOB SERPL: 1.5 {RATIO} (ref 1.2–2.2)
ALP SERPL-CCNC: 75 IU/L (ref 39–117)
ALT SERPL-CCNC: 24 IU/L (ref 0–44)
AST SERPL-CCNC: 21 IU/L (ref 0–40)
BILIRUB SERPL-MCNC: 0.4 MG/DL (ref 0–1.2)
BUN SERPL-MCNC: 12 MG/DL (ref 6–20)
BUN/CREAT SERPL: 12 (ref 9–20)
CALCIUM SERPL-MCNC: 9.9 MG/DL (ref 8.7–10.2)
CHLORIDE SERPL-SCNC: 91 MMOL/L (ref 96–106)
CHOLEST SERPL-MCNC: 166 MG/DL (ref 100–199)
CO2 SERPL-SCNC: 26 MMOL/L (ref 18–29)
CREAT SERPL-MCNC: 0.97 MG/DL (ref 0.76–1.27)
CREAT UR-MCNC: 200.8 MG/DL
EST. AVERAGE GLUCOSE BLD GHB EST-MCNC: 226 MG/DL
GLOBULIN SER CALC-MCNC: 2.9 G/DL (ref 1.5–4.5)
GLUCOSE SERPL-MCNC: 174 MG/DL (ref 65–99)
HBA1C MFR BLD: 9.5 % (ref 4.8–5.6)
HDLC SERPL-MCNC: 52 MG/DL
INTERPRETATION, 910389: NORMAL
LDLC SERPL CALC-MCNC: 100 MG/DL (ref 0–99)
Lab: NORMAL
MICROALBUMIN UR-MCNC: 16.3 UG/ML
POTASSIUM SERPL-SCNC: 4.2 MMOL/L (ref 3.5–5.2)
PROT SERPL-MCNC: 7.2 G/DL (ref 6–8.5)
SODIUM SERPL-SCNC: 134 MMOL/L (ref 134–144)
TRIGL SERPL-MCNC: 70 MG/DL (ref 0–149)
VLDLC SERPL CALC-MCNC: 14 MG/DL (ref 5–40)

## 2017-04-16 NOTE — PROGRESS NOTES
Please contact patient to discuss sugars and treatment - I have made a dent but he needs a lot more helps

## 2017-05-09 RX ORDER — OMEPRAZOLE 20 MG/1
CAPSULE, DELAYED RELEASE ORAL
Qty: 30 CAP | Refills: 5 | Status: SHIPPED | OUTPATIENT
Start: 2017-05-09 | End: 2017-10-29 | Stop reason: SDUPTHER

## 2017-05-11 ENCOUNTER — TELEPHONE (OUTPATIENT)
Dept: INTERNAL MEDICINE CLINIC | Age: 42
End: 2017-05-11

## 2017-05-11 NOTE — TELEPHONE ENCOUNTER
----- Message from Heather Ford sent at 5/11/2017  7:45 AM EDT -----  Regarding: /telephone  Contact: [phone]  Pt is requesting a call back to reschedule appt.  Pt's best contact number is [phone]

## 2017-05-16 ENCOUNTER — OFFICE VISIT (OUTPATIENT)
Dept: CARDIOLOGY CLINIC | Age: 42
End: 2017-05-16

## 2017-05-16 VITALS
WEIGHT: 315 LBS | DIASTOLIC BLOOD PRESSURE: 100 MMHG | HEIGHT: 71 IN | BODY MASS INDEX: 44.1 KG/M2 | HEART RATE: 91 BPM | SYSTOLIC BLOOD PRESSURE: 145 MMHG

## 2017-05-16 DIAGNOSIS — R00.2 PALPITATIONS: Primary | ICD-10-CM

## 2017-05-16 DIAGNOSIS — I10 ESSENTIAL HYPERTENSION: ICD-10-CM

## 2017-05-16 DIAGNOSIS — E66.01 MORBID OBESITY, UNSPECIFIED OBESITY TYPE (HCC): ICD-10-CM

## 2017-05-16 RX ORDER — SPIRONOLACTONE 25 MG/1
25 TABLET ORAL DAILY
Qty: 30 TAB | Refills: 12 | Status: SHIPPED | OUTPATIENT
Start: 2017-05-16 | End: 2018-05-21 | Stop reason: SDUPTHER

## 2017-05-18 ENCOUNTER — HOSPITAL ENCOUNTER (OUTPATIENT)
Dept: SLEEP MEDICINE | Age: 42
Discharge: HOME | End: 2017-05-18
Payer: COMMERCIAL

## 2017-05-18 DIAGNOSIS — G47.33 OSA (OBSTRUCTIVE SLEEP APNEA): ICD-10-CM

## 2017-05-18 DIAGNOSIS — R06.89 HYPOVENTILATION: ICD-10-CM

## 2017-05-18 PROCEDURE — 95810 POLYSOM 6/> YRS 4/> PARAM: CPT

## 2017-05-19 ENCOUNTER — DOCUMENTATION ONLY (OUTPATIENT)
Dept: SLEEP MEDICINE | Age: 42
End: 2017-05-19

## 2017-05-19 VITALS
HEART RATE: 105 BPM | HEIGHT: 71 IN | SYSTOLIC BLOOD PRESSURE: 164 MMHG | OXYGEN SATURATION: 98 % | WEIGHT: 315 LBS | DIASTOLIC BLOOD PRESSURE: 104 MMHG | BODY MASS INDEX: 44.1 KG/M2

## 2017-05-19 NOTE — PROGRESS NOTES
This note is being routed to Kalina. Sleep Medicine consult note and sleep study report in patient's chart for review.     Thank you for the referral.

## 2017-05-20 LAB
ALDOST SERPL-MCNC: 2.2 NG/DL (ref 0–30)
ALDOST/RENIN PLAS-RTO: 7.8 {RATIO} (ref 0–30)
BUN SERPL-MCNC: 8 MG/DL (ref 6–24)
BUN/CREAT SERPL: 10 (ref 9–20)
CALCIUM SERPL-MCNC: 8.8 MG/DL (ref 8.7–10.2)
CHLORIDE SERPL-SCNC: 96 MMOL/L (ref 96–106)
CO2 SERPL-SCNC: 27 MMOL/L (ref 18–29)
CREAT SERPL-MCNC: 0.8 MG/DL (ref 0.76–1.27)
GLUCOSE SERPL-MCNC: 137 MG/DL (ref 65–99)
POTASSIUM SERPL-SCNC: 4.1 MMOL/L (ref 3.5–5.2)
RENIN PLAS-CCNC: 0.28 NG/ML/HR (ref 0.17–5.38)
SODIUM SERPL-SCNC: 139 MMOL/L (ref 134–144)

## 2017-05-22 ENCOUNTER — TELEPHONE (OUTPATIENT)
Dept: SLEEP MEDICINE | Age: 42
End: 2017-05-22

## 2017-05-22 DIAGNOSIS — G47.33 OSA (OBSTRUCTIVE SLEEP APNEA): Primary | ICD-10-CM

## 2017-05-22 DIAGNOSIS — I10 ESSENTIAL HYPERTENSION: ICD-10-CM

## 2017-05-22 NOTE — TELEPHONE ENCOUNTER
Results of Sleep Testing, PAP prescription and follow-up discussed with patient. Patient encouraged to call if there were any further questions regarding sleep symptoms. Encounter Diagnoses   Name Primary?     JENNIFER (obstructive sleep apnea) Yes    Essential hypertension        Orders Placed This Encounter    SLEEP LAB (PAP TITRATION)     Standing Status:   Future     Standing Expiration Date:   11/20/2017     Order Specific Question:   Reason for Exam     Answer:   JENNIFER

## 2017-05-22 NOTE — TELEPHONE ENCOUNTER
Called patient to scheduled titration, patient will call back to schedule per phone had bad reception

## 2017-05-25 LAB
BUN SERPL-MCNC: 10 MG/DL (ref 6–24)
BUN/CREAT SERPL: 13 (ref 9–20)
CALCIUM SERPL-MCNC: 9.9 MG/DL (ref 8.7–10.2)
CHLORIDE SERPL-SCNC: 93 MMOL/L (ref 96–106)
CO2 SERPL-SCNC: 27 MMOL/L (ref 18–29)
CREAT SERPL-MCNC: 0.78 MG/DL (ref 0.76–1.27)
GLUCOSE SERPL-MCNC: 143 MG/DL (ref 65–99)
POTASSIUM SERPL-SCNC: 4.6 MMOL/L (ref 3.5–5.2)
SODIUM SERPL-SCNC: 138 MMOL/L (ref 134–144)

## 2017-06-01 ENCOUNTER — TELEPHONE (OUTPATIENT)
Dept: INTERNAL MEDICINE CLINIC | Age: 42
End: 2017-06-01

## 2017-06-01 NOTE — TELEPHONE ENCOUNTER
Rufino Faulkner he would like for you to call him so he can set up appointment with you his no is [phone]

## 2017-06-06 RX ORDER — SIMVASTATIN 20 MG/1
TABLET, FILM COATED ORAL
Qty: 30 TAB | Refills: 5 | Status: SHIPPED | OUTPATIENT
Start: 2017-06-06 | End: 2017-11-25 | Stop reason: SDUPTHER

## 2017-06-08 ENCOUNTER — OFFICE VISIT (OUTPATIENT)
Dept: INTERNAL MEDICINE CLINIC | Age: 42
End: 2017-06-08

## 2017-06-08 RX ORDER — ASPIRIN 81 MG/1
81 TABLET ORAL DAILY
COMMUNITY
End: 2019-07-18

## 2017-06-08 NOTE — PROGRESS NOTES
CC:  Diabetes management/education    S/O: Micheline Jiménez is a 38 y.o. male referred by Dr. Jolene Paige MD for diabetes management/education. Diabetes:  -diagnosed when about 23years old after having pneumonia. Was frequently urinating and felt terrible. Went to ER and sugars were > 500.   -started on Metformin and couldn't tolerate it because of diarrhea  -tried other oral agents, unsure which ones      HPI:  -Biggest problem is figuring out how to keep his sugars pretty even keel with his sporadic eating schedule  -will sometimes skip lantus if he has not eaten all day because scared of hypoglycemia at night      Current Diabetes Regimen:  -Glipizide ER 5mg every morning- tries to eat something with  -Lantus 40 units once daily at night - recently has not been eating because of decreased accessibility to food  -Tradjenta 5mg daily  -Jardiance 25mg daily       ROS:   Patient denies hypoglycemic and hyperglycemic signs/symptoms, chest pain, shortness of breath, neuropathy, vision changes, and any foot changes. Self-Monitoring Blood Glucose:  -Has had lower sugars recently since not having food  -hasn't been checking much lately.  Since he moved, he was out of pain medicine because of insurance issues.   -132 yesterday morning  -this morning 213 (had craisins last night)    Diet:  -salads with imitation crab meat and only uses a little bit of light Kyrgyz or light [place]  -stir fries- chicken, olive oil, stir nguyen vegetables (frozen bag), tries to use brown rice but has a hard time cooking it.   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 Sera Gamble - pain management    Depression     Diabetes ([facility]     Dyslipidemia     HTN (hypertension)     Morbid obesity ([facility]     JENNIFER (obstructive sleep apnea) 4/12/2012    Psychotic disorder      Past Surgical History:   Procedure Laterality Date    IR INJ EPIDURAL LUMBAR SACRAL       Family History   Problem Relation Age of Onset    Cancer Mother     Heart Disease Mother     Arthritis-osteo Mother     Diabetes Mother     Coronary Artery Disease Mother     Hypertension Father     Stroke Father      Social History     Social History    Marital status: SINGLE     Spouse name: N/A    Number of children: N/A    Years of education: N/A     Social History Main Topics    Smoking status: Never Smoker    Smokeless tobacco: Never Used    Alcohol use No    Drug use: No    Sexual activity: Not Currently     Partners: Female     Other Topics Concern    Not on file     Social History Narrative     Allergies   Allergen Reactions    Latex Hives    Clindamycin-Benzoyl Peroxide Rash    Metformin Diarrhea     Current Outpatient Prescriptions   Medication Sig    simvastatin (ZOCOR) 20 mg tablet take 1 tablet every evening    spironolactone (ALDACTONE) 25 mg tablet Take 1 Tab by mouth daily.  omeprazole (PRILOSEC) 20 mg capsule take 1 capsule by mouth once daily    atenolol (TENORMIN) 50 mg tablet Take 1 Tab by mouth daily.  BABY ASPIRIN PO Take  by mouth.     dilTIAZem CD (CARDIZEM CD) 240 mg ER capsule take 1 capsule by mouth once daily    glipiZIDE SR (GLUCOTROL XL) 5 mg CR tablet take 1 tablet by mouth once daily    LANTUS SOLOSTAR 100 unit/mL (3 mL) pen INJECT 40 UNITS SUBCUTANEOUSLY ONCE NIGHTLY    hydroCHLOROthiazide (HYDRODIURIL) 25 mg tablet take 1 tablet by mouth once daily    lisinopril (PRINIVIL, ZESTRIL) 40 mg tablet take 1 tablet by mouth once daily    Lancets misc Pt request One Touch Delica to test blood sugars three time daily    DULoxetine (CYMBALTA) 30 mg capsule Take 30 mg by mouth two (2) times a day.  ONETOUCH ULTRA TEST strip TEST three times a day    Blood-Glucose Meter monitoring kit Pt needs One Touch Ultra glucometer to test blood sugars three times daily E11.9    TRADJENTA 5 mg tablet TAKE 1 TABLET BY MOUTH DAILY    albuterol (PROAIR HFA) 90 mcg/actuation inhaler Take 1 Puff by inhalation every four (4) hours as needed for Wheezing or Shortness of Breath.  empagliflozin (JARDIANCE) 25 mg tablet Take 1 Tab by mouth daily for 30 days.  fentaNYL (DURAGESIC) 50 mcg/hr PATCH 1 Patch by TransDERmal route every seventy-two (72) hours.  fexofenadine (ALLEGRA) 180 mg tablet Take  by mouth.  cyclobenzaprine (FLEXERIL) 10 mg tablet Take  by mouth three (3) times daily as needed for Muscle Spasm(s).  tramadol (ULTRAM) 50 mg tablet Take 50 mg by mouth four (4) times daily.  meloxicam (MOBIC) 7.5 mg tablet Take  by mouth daily. No current facility-administered medications for this visit. There were no vitals taken for this visit. Data reviewed:  Lab Results   Component Value Date/Time    Hemoglobin A1c 9.5 04/14/2017 10:34 AM    Hemoglobin A1c (POC) 6.8 02/03/2014 11:30 AM     Lab Results   Component Value Date/Time    Cholesterol, total 166 04/14/2017 10:34 AM    HDL Cholesterol 52 04/14/2017 10:34 AM    LDL, calculated 100 04/14/2017 10:34 AM    VLDL, calculated 14 04/14/2017 10:34 AM    Triglyceride 70 04/14/2017 10:34 AM     Lab Results   Component Value Date/Time    ALT (SGPT) 24 04/14/2017 10:34 AM    AST (SGOT) 21 04/14/2017 10:34 AM    Alk.  phosphatase 75 04/14/2017 10:34 AM    Bilirubin, total 0.4 04/14/2017 10:34 AM     Lab Results   Component Value Date/Time    Creatinine 0.78 05/24/2017 12:33 PM     Lab Results   Component Value Date/Time    Microalb/Creat ratio (ug/mg creat.) 8.1 04/14/2017 10:34 AM    Microalbumin, urine 16.3 04/14/2017 10:34 AM       Diabetes Health Maintenance:  Last eye exam: due now (confirm with patient)  Last foot exam: 4/14/17  Last influenza vaccine: 9/9/16  Last Pneumovax 23 vaccine: unknown  Last Prevnar-13 vaccine: NA  Hepatitis B Series: unknown  ASA Therapy: ASA 81mg - not taking because forgets to purchase  ACE/ARB Therapy: Lisinopril 40mg daily  Statin Therapy: Simvastatin 20mg daily    Assessment/Plan:     1. Diabetes:  a1c not at goal < 7% and patient is rarely checking sugars. Since moving out on his own, his eating habits have varied greatly. He has been low on money and unable to purchase foods at time. I talked to the patient about the importance of food and ways to increase access (local food rudolph, etc.). He states he would never use these resources as he has too much pride. It was hard to determine exactly how I could assist him with achieving better blood sugar control and improve his diet. We talked about food options and I tried to make specific suggestions based on what he likes, however, he was only listing things that were already considered low carb/healthy and does not want to bring anything else into the house. He has been taking his oral medications at various times throughout the day- sometimes at night. Advised him that this could be contributing to nocturnal hypoglycemia and that he should take his oral diabetes medications when we wakes up/with breakfast. We discussed the mechanism of Lantus and how skipping Lantus is not ideal because then his body has to play catch up to get the sugars down. If he continues to eat very little food, may consider decreasing the dose of Lantus but not skipping altogether. Gave patient a blood sugar log and asked him to record his sugars 3 times a day and also to record how much Lantus he is using so that we can better adjust his therapy. Encouraged patient to restart taking his ASA 81mg for cardiovascular protection.  Unsure how motivated patient is to really get blood sugars under control and this seems to be extremely impacted by his chronic back pain/pain management. Patient verbalized understanding of the information presented and all of the patients questions were answered. AVS was handed to the patient and information reviewed. Patient advised to call me or Dr. Masood Ortega MD with any additional questions or concerns. Follow-up: 6 weeks (2 weeks after Dr. Refugio Eden)  Notification of recommendations will be sent to Dr. Masood Ortega MD for review.     Thank you for the consult,  Ruth Castro, PharmD, BCPS, CDE    Time spent with the patient: 60  Time spent documentin

## 2017-06-08 NOTE — Clinical Note
Interesting bobby-- hard to figure out how motivated he is. Gave him a log and asked him to bring to his appt with you in a month- then he'll see me 2 weeks after.    Thanks

## 2017-06-08 NOTE — PATIENT INSTRUCTIONS
Things to try:  -Cauliflower rice (frozen sections)  -Quinoa   -Hummus        1) Start taking blood sugars 3 times a day - fasting, either pre-lunch or pre-dinner, and bedtime. Record on log. 2) Take your oral diabetes medications in the morning and lantus at night. Try to take Lantus every night, even if you reduce the dose because you're not eating. Record your dose of lantus on your log. 3) Try to incorporate more non-starchy vegetables and more protein in your diet.         Follow-up:  7/27/17 at 11 am

## 2017-06-08 NOTE — MR AVS SNAPSHOT
Visit Information Date & Time Provider Department Dept. Phone Encounter #  
 6/8/2017 10:30 AM Luisa Hameed [facility] [address] [id number] Your Appointments 6/14/2017  9:00 AM  
STRESS TEST with GARO ARDON  
[facility] ([facility]) Appt Note: echo at 1pm per dr Nadege Mei ,reg stress test at 2pm holter at 230 pt rs'd 5/31 to 6/14 kmr  
 [address] [facility] [address] [facility] [address]  
[phone]  
  
   
 [address] [facility] [place] [address] [address] [place]  
  
    
 6/14/2017 10:00 AM  
ECHO CARDIOGRAMS 2D with ISAAC BARROS  
[facility] ([address] [place] [address]) Appt Note: echo at 1pm per dr Nadege Mei ,reg stress test at 2pm holter at 230 pt rs'd 5/31 to 6/14 kmr  
 [address] [facility] [address] [facility] [address]  
[phone]  
  
   
 [address] [address]  
  
    
 6/14/2017 11:00 AM  
HOLTER MONITOR with HOLTER, STFRANCIS  
[facility] ([address] [place] [address] Road) Appt Note: echo at 1pm per dr Nadege Mei ,reg stress test at 2pm holter at 230; echo at 1pm per dr Nadege Mei ,reg stress test at 2pm holter at 230 pt rs'd 5/31 to 6/14 kmr  
 [address] [facility] [place] [address] [facility] [address]  
[phone]  
  
   
 [address] [address]  
  
    
 6/20/2017 11:00 AM  
ESTABLISHED PATIENT with Tom Vann MD  
[facility] ([address] [place] [address] Road) Appt Note: 1 mo fu  
 [address] [facility] [place] [address] [facility]  
[phone]  
  
   
 [address] [facility] [address]  
  
    
 7/14/2017 10:00 AM  
ROUTINE CARE with Izabel Dickinson MD  
[facility] [address]) Appt Note: 3 mo [address] [employer] [address] [facility]  
[phone] [address] [address] Upcoming Health Maintenance Date Due DTaP/Tdap/Td series (1 - Tdap) 1/10/2012 EYE EXAM RETINAL OR DILATED Q1 11/3/2015 INFLUENZA AGE 9 TO ADULT 8/1/2017 HEMOGLOBIN A1C Q6M 10/14/2017 FOOT EXAM Q1 4/14/2018 MICROALBUMIN Q1 4/14/2018 LIPID PANEL Q1 4/14/2018 Allergies as of 6/8/2017  Review Complete On: 5/16/2017 By: Isela Oconnor MD  
  
 Severity Noted Reaction Type Reactions Latex  03/17/2016    Hives Clindamycin-benzoyl Peroxide  02/03/2014    Rash Metformin  03/18/2010    Diarrhea Current Immunizations  Reviewed on 1/12/2012 Name Date Influenza High Dose Vaccine PF 9/9/2016 Influenza Vaccine 10/1/2015 Influenza Vaccine Split 10/28/2011 TD Vaccine 1/9/2012 Not reviewed this visit Vitals Smoking Status Never Smoker Preferred Pharmacy Pharmacy Name Phone [phone] [address] [phone] Your Updated Medication List  
  
   
This list is accurate as of: 6/8/17 10:55 AM.  Always use your most recent med list.  
  
  
  
  
 albuterol 90 mcg/actuation inhaler Commonly known as:  PROAIR HFA Take 1 Puff by inhalation every four (4) hours as needed for Wheezing or Shortness of Breath. aspirin delayed-release 81 mg tablet Take 81 mg by mouth daily. atenolol 50 mg tablet Commonly known as:  TENORMIN Take 1 Tab by mouth daily. Blood-Glucose Meter monitoring kit Pt needs One Touch Ultra glucometer to test blood sugars three times daily E11.9  
  
 cyclobenzaprine 10 mg tablet Commonly known as:  FLEXERIL Take  by mouth three (3) times daily as needed for Muscle Spasm(s). dilTIAZem  mg ER capsule Commonly known as:  CARDIZEM CD  
take 1 capsule by mouth once daily DULoxetine 30 mg capsule Commonly known as:  CYMBALTA Take 30 mg by mouth two (2) times a day. empagliflozin 25 mg tablet Commonly known as:  Maciej Shankar Take 1 Tab by mouth daily for 30 days. fentaNYL 50 mcg/hr PATCH Commonly known as:  DURAGESIC  
1 Patch by TransDERmal route every seventy-two (72) hours. fexofenadine 180 mg tablet Commonly known as:  Annetta Tremaine Take 180 mg by mouth daily. glipiZIDE SR 5 mg CR tablet Commonly known as:  GLUCOTROL XL  
take 1 tablet by mouth once daily  
  
 hydroCHLOROthiazide 25 mg tablet Commonly known as:  HYDRODIURIL  
take 1 tablet by mouth once daily Lancets [facility] Pt request One Touch Delica to test blood sugars three time daily LANTUS SOLOSTAR 100 unit/mL (3 mL) Inpn Generic drug:  insulin glargine INJECT 40 UNITS SUBCUTANEOUSLY ONCE NIGHTLY  
  
 lisinopril 40 mg tablet Commonly known as:  PRINIVIL, ZESTRIL  
take 1 tablet by mouth once daily  
  
 meloxicam 7.5 mg tablet Commonly known as:  MOBIC Take  by mouth daily. omeprazole 20 mg capsule Commonly known as:  PRILOSEC  
take 1 capsule by mouth once daily ONETOUCH ULTRA TEST strip Generic drug:  glucose blood VI test strips TEST three times a day  
  
 simvastatin 20 mg tablet Commonly known as:  ZOCOR  
take 1 tablet every evening  
  
 spironolactone 25 mg tablet Commonly known as:  ALDACTONE Take 1 Tab by mouth daily. TRADJENTA 5 mg tablet Generic drug:  linagliptin TAKE 1 TABLET BY MOUTH DAILY  
  
 traMADol 50 mg tablet Commonly known as:  ULTRAM  
Take 50 mg by mouth four (4) times daily. To-Do List   
 07/06/2017 9:30 PM  
  Appointment with BEDROOM [facility] [address] at [address] [place] [address] ([phone]) 1. Do not take a nap the day of the study  2. No caffeine after 12 noon the day of the study  3. Bring a 2 piece sleeping garment  4.  Hair should be clean and dry, no oils, sprays, powders and remove wigs, weaves or other hair accessories  6. Patient should eat dinner prior to arriving for the test and a light breakfast will be provided upon discharge in the morning  7. Patient encouraged to bring activity items such as books, magazines, laptop, IPAD, etc, as well as toiletry items needed for the next morning  8. Bring all medications with you to the center  9. For specific questions please contact the sleep center directly, [phone]  10. Do not arrive more than 15 minutes prior to appt time and use the doorbell to enter the sleep center. Patient Instructions Things to try: 
-Cauliflower rice (frozen sections) -Fleurette Dad  
-Hummus 1) Start taking blood sugars 3 times a day - fasting, either pre-lunch or pre-dinner, and bedtime. Record on log. 2) Take your oral diabetes medications in the morning and lantus at night. Try to take Lantus every night, even if you reduce the dose because you're not eating. Record your dose of lantus on your log. 3) Try to incorporate more non-starchy vegetables and more protein in your diet. Follow-up:  7/27/17 at 11 am 
 
  
Introducing [facility]! [employer] introduces [employer] patient portal. Now you can access parts of your medical record, email your doctor's office, and request medication refills online. 1. In your internet browser, go to [email or web address] 2. Click on the First Time User? Click Here link in the Sign In box. You will see the New Member Sign Up page. 3. Enter your [employer] Access Code exactly as it appears below. You will not need to use this code after youve completed the sign-up process. If you do not sign up before the expiration date, you must request a new code. · [employer] Access Code: [email or web address] Expires: 8/14/2017  1:47 PM 
 
4. Enter the last four digits of your Social Security Number (xxxx) and Date of Birth (mm/dd/yyyy) as indicated and click Submit. You will be taken to the next sign-up page. 5. Create a [employer] ID. This will be your [employer] login ID and cannot be changed, so think of one that is secure and easy to remember. 6. Create a [employer] password. You can change your password at any time. 7. Enter your Password Reset Question and Answer. This can be used at a later time if you forget your password. 8. Enter your e-mail address. You will receive e-mail notification when new information is available in [address] 9. Click Sign Up. You can now view and download portions of your medical record. 10. Click the Download Summary menu link to download a portable copy of your medical information. If you have questions, please visit the Frequently Asked Questions section of the [employer] website. Remember, [employer] is NOT to be used for urgent needs. For medical emergencies, dial 911. Now available from your iPhone and Android! Please provide this summary of care documentation to your next provider. Your primary care clinician is listed as Era Sender. If you have any questions after today's visit, please call [phone].

## 2017-06-14 ENCOUNTER — CLINICAL SUPPORT (OUTPATIENT)
Dept: CARDIOLOGY CLINIC | Age: 42
End: 2017-06-14

## 2017-06-14 DIAGNOSIS — R00.2 PALPITATIONS: Primary | ICD-10-CM

## 2017-06-14 DIAGNOSIS — I10 ESSENTIAL HYPERTENSION: ICD-10-CM

## 2017-06-14 DIAGNOSIS — R00.2 PALPITATION: Primary | ICD-10-CM

## 2017-06-14 DIAGNOSIS — E78.5 DYSLIPIDEMIA, GOAL LDL BELOW 100: ICD-10-CM

## 2017-06-14 DIAGNOSIS — E66.01 MORBID OBESITY, UNSPECIFIED OBESITY TYPE (HCC): ICD-10-CM

## 2017-06-14 DIAGNOSIS — G47.33 OSA (OBSTRUCTIVE SLEEP APNEA): ICD-10-CM

## 2017-06-14 NOTE — PROGRESS NOTES
Applied holter monitor for pt per Dr Jessica Lr  Dx: palps. Pt has #[id number] & is due back on [facility] 6/15/17.

## 2017-06-14 NOTE — PROGRESS NOTES
Explained procedure to patient, monitoring EKG/HR/BP, walking on treadmill, and risks/discomforts. All concerns and questions addressed. Consent obtained. Pt very anxious. Pt did not take antenolol as instructed. Rest  (Normal RHR ~100 on Antenolol). Resting EKG reviewed by Dr Loni Avila. Pt performed modified alexis protocol due to back concerns. Pt post exercise HR came down to 120s-130. Pt voice anxiousness for echo to be performed. Pt instructed to take antenolol as soon as possible (medication at home). See scanned report.  ordered and Dr. Kalie Hernandez read study. ID verified per protocol. Pt  reported no symptoms at completion of protocol.

## 2017-06-20 ENCOUNTER — OFFICE VISIT (OUTPATIENT)
Dept: CARDIOLOGY CLINIC | Age: 42
End: 2017-06-20

## 2017-06-20 VITALS
SYSTOLIC BLOOD PRESSURE: 124 MMHG | HEART RATE: 72 BPM | HEIGHT: 71 IN | BODY MASS INDEX: 44.1 KG/M2 | DIASTOLIC BLOOD PRESSURE: 84 MMHG | WEIGHT: 315 LBS

## 2017-06-20 DIAGNOSIS — E78.5 DYSLIPIDEMIA, GOAL LDL BELOW 100: ICD-10-CM

## 2017-06-20 DIAGNOSIS — R00.2 PALPITATIONS: ICD-10-CM

## 2017-06-20 DIAGNOSIS — E66.01 MORBID OBESITY, UNSPECIFIED OBESITY TYPE (HCC): ICD-10-CM

## 2017-06-20 DIAGNOSIS — I10 ESSENTIAL HYPERTENSION: Primary | ICD-10-CM

## 2017-06-20 NOTE — PROGRESS NOTES
Mine Meyers MD. [facility]              Patient: Glen Landon  : 1975      Today's Date: 2017            HISTORY OF PRESENT ILLNESS:     History of Present Illness:  Mr. Herbie Balbuena is here for follow-up. Having problems with back and knee pain. Keeps having palpitations lasting a few seconds. No exertional chest pain - only when stressed out. He does try and exercise regularly. PAST MEDICAL HISTORY:     Past Medical History:   Diagnosis Date    Arthritis     Asthma     Chronic back pain 2011    Dr. Donny Sr - pain management    Depression     [facility]     Dyslipidemia     HTN (hypertension)     Morbid obesity ([facility]     JENNIFER (obstructive sleep apnea) 2012    Psychotic disorder          Past Surgical History:   Procedure Laterality Date    IR INJ EPIDURAL LUMBAR SACRAL             MEDICATIONS:     Current Outpatient Prescriptions   Medication Sig Dispense Refill    glipiZIDE SR (GLUCOTROL XL) 5 mg CR tablet take 1 tablet by mouth once daily 30 Tab 5    simvastatin (ZOCOR) 20 mg tablet take 1 tablet every evening 30 Tab 5    spironolactone (ALDACTONE) 25 mg tablet Take 1 Tab by mouth daily. 30 Tab 12    omeprazole (PRILOSEC) 20 mg capsule take 1 capsule by mouth once daily 30 Cap 5    atenolol (TENORMIN) 50 mg tablet Take 1 Tab by mouth daily.  30 Tab 3    dilTIAZem CD (CARDIZEM CD) 240 mg ER capsule take 1 capsule by mouth once daily 30 Cap 5    LANTUS SOLOSTAR 100 unit/mL (3 mL) pen INJECT 40 UNITS SUBCUTANEOUSLY ONCE NIGHTLY 12 Pen 5    hydroCHLOROthiazide (HYDRODIURIL) 25 mg tablet take 1 tablet by mouth once daily 30 Tab 3    lisinopril (PRINIVIL, ZESTRIL) 40 mg tablet take 1 tablet by mouth once daily 30 Tab 3    DULoxetine (CYMBALTA) 30 mg capsule Take 30 mg by mouth two (2) times a day.  0    TRADJENTA 5 mg tablet TAKE 1 TABLET BY MOUTH DAILY 30 Tab 3    albuterol (PROAIR HFA) 90 mcg/actuation inhaler Take 1 Puff by inhalation every four (4) hours as needed for Wheezing or Shortness of Breath. 1 Inhaler 1    fentaNYL (DURAGESIC) 50 mcg/hr PATCH 1 Patch by TransDERmal route every seventy-two (72) hours.  fexofenadine (ALLEGRA) 180 mg tablet Take 180 mg by mouth daily.  cyclobenzaprine (FLEXERIL) 10 mg tablet Take  by mouth three (3) times daily as needed for Muscle Spasm(s).  tramadol (ULTRAM) 50 mg tablet Take 50 mg by mouth four (4) times daily.  meloxicam (MOBIC) 7.5 mg tablet Take  by mouth daily.  aspirin delayed-release 81 mg tablet Take 81 mg by mouth daily.  Lancets misc Pt request One Touch Delica to test blood sugars three time daily 1 Each 11    ONETOUCH ULTRA TEST strip TEST three times a day 100 Strip 5    Blood-Glucose Meter monitoring kit Pt needs One Touch Ultra glucometer to test blood sugars three times daily E11.9 1 Kit 0       Allergies   Allergen Reactions    Latex Hives    Clindamycin-Benzoyl Peroxide Rash    Metformin Diarrhea             SOCIAL HISTORY:     Social History   Substance Use Topics    Smoking status: Never Smoker    Smokeless tobacco: Never Used    Alcohol use No         FAMILY HISTORY:     Family History   Problem Relation Age of Onset    Cancer Mother     Heart Disease Mother     Arthritis-osteo Mother     Diabetes Mother     Coronary Artery Disease Mother     Hypertension Father     Stroke Father             REVIEW OF SYMPTOMS:      Review of Symptoms:  Constitutional: Negative for fever  HEENT: Negative for nosebleeds, tinnitus, and vision changes. Respiratory: +MARCELINO  Cardiovascular: No recent syncope. Gastrointestinal: Negative for melena. Genitourinary: Negative for dysuria  Musculoskeletal: + joint pain   Skin: Negative for rash  Heme: No problems bleeding. Neurological: Negative for speech change and focal weakness.    + withdrawal from pain meds               PHYSICAL EXAM:      Physical Exam:  Visit Vitals    /84    Pulse 72    Ht 5' 11\" (1.803 m)  Wt (!) 371 lb (168.3 kg)    BMI 51.74 kg/m2       Patient appears generally well, mood and affect are appropriate and pleasant. HEENT: Hearing intact, non-icteric, normocephalic, atraumatic. Neck Exam: Supple, No JVD or carotid bruits. Lung Exam: Clear to auscultation, even breath sounds. Cardiac Exam: Regular rate and rhythm with no murmur  Abdomen: Soft, non-tender, normal bowel sounds. + obese  Extremities: Moves all ext well. No lower extremity edema. Vascular: 2+ dorsalis pedis pulses bilaterally. Psych: Appropriate affect  Neuro - Grossly intact              LABS / OTHER STUDIES:      Component      Latest Ref Rng & Units 5/16/2017           9:40 AM   Aldosterone      0.0 - 30.0 ng/dL 2.2   Renin Activity      0.167 - 5.380 ng/mL/hr 0.281   Aldos/Renin Ratio      0.0 - 30.0 7.8     Lab Results   Component Value Date/Time    Sodium 138 05/24/2017 12:33 PM    Potassium 4.6 05/24/2017 12:33 PM    Chloride 93 05/24/2017 12:33 PM    CO2 27 05/24/2017 12:33 PM    Anion gap 8 01/15/2010 11:03 AM    Glucose 143 05/24/2017 12:33 PM    BUN 10 05/24/2017 12:33 PM    Creatinine 0.78 05/24/2017 12:33 PM    BUN/Creatinine ratio 13 05/24/2017 12:33 PM    GFR est  05/24/2017 12:33 PM    GFR est non- 05/24/2017 12:33 PM    Calcium 9.9 05/24/2017 12:33 PM    Bilirubin, total 0.4 04/14/2017 10:34 AM    AST (SGOT) 21 04/14/2017 10:34 AM    Alk.  phosphatase 75 04/14/2017 10:34 AM    Protein, total 7.2 04/14/2017 10:34 AM    Albumin 4.3 04/14/2017 10:34 AM    Globulin 3.5 01/15/2010 11:03 AM    A-G Ratio 1.5 04/14/2017 10:34 AM    ALT (SGPT) 24 04/14/2017 10:34 AM       Lab Results   Component Value Date/Time    Cholesterol, total 166 04/14/2017 10:34 AM    HDL Cholesterol 52 04/14/2017 10:34 AM    LDL, calculated 100 04/14/2017 10:34 AM    VLDL, calculated 14 04/14/2017 10:34 AM    Triglyceride 70 04/14/2017 10:34 AM       Lab Results   Component Value Date/Time    WBC 5.2 10/07/2013 11:47 AM    HGB 14.5 10/07/2013 11:47 AM    HCT 43.9 10/07/2013 11:47 AM    PLATELET 137 92/15/5615 11:47 AM    MCV 84 10/07/2013 11:47 AM       Lab Results   Component Value Date/Time    TSH 2.350 01/10/2011 11:21 AM                CARDIAC DIAGNOSTICS:      Cardiac Evaluation Includes:  EKG 5/15/17 - NSR, normal      Treadmill Stress test 6/14/17 - sinus tach () at rest. Walked 8:10 on modified Cyrus (4.6 METS), stopping for SOB/knee pain. Reached 95% MPHR. No ischemic EKG changes. /86 at start of study.      Echo 6/14/17 - LVEF 55-60%, normal study      Holter 6/14/17 - HR , Avg 81. 1 PVC and no PAC's. Normal study. Symptoms correlate with sinus / sinus tach.              ASSESSMENT AND PLAN:      Assessment and Plan:  1) Atypical chest pain and palpitations  - Symptoms atypical for angina. Has nearly daily brief palpitations. - Treadmill Stress test 6/14/17 - sinus tach () at rest. Walked 8:10 on modified Cyrus (4.6 METS), stopping for SOB/knee pain. Reached 95% MPHR. No ischemic EKG changes. /86 at start of study. - Echo 6/14/17 - LVEF 55-60%, normal study   - Holter 6/14/17 was normal      2) Difficult to control HTN  - problems likely due to morbid obesity. Stan/renin level OK  - cont cardizem, atenolol, lisinopril, HCTZ, and aldactone   - BP looks better with addition of aldactone.    3) CV risk management   - His ASCVD 10 year risk is 13%  - discussed diet, exercise, weight loss  - cont statin and ASA     4) Obesity   - he has previously looked into weight loss surgery      5) Chronic pain  - managed by Dr. Marielena Pedro     6) See me in three months. Patient expressed understanding of the plan - questions were answered. On Disability after injury from [place] years ago. Single. Enjoys singing.         Yennifer Pak MD, [address] [place] [address]  [address]  Ph: [phone]   Ph [phone]

## 2017-06-23 DIAGNOSIS — I10 ESSENTIAL HYPERTENSION WITH GOAL BLOOD PRESSURE LESS THAN 130/80: ICD-10-CM

## 2017-06-23 RX ORDER — HYDROCHLOROTHIAZIDE 25 MG/1
TABLET ORAL
Qty: 30 TAB | Refills: 3 | Status: SHIPPED | OUTPATIENT
Start: 2017-06-23 | End: 2017-09-19

## 2017-06-23 RX ORDER — LISINOPRIL 40 MG/1
TABLET ORAL
Qty: 30 TAB | Refills: 3 | Status: SHIPPED | OUTPATIENT
Start: 2017-06-23 | End: 2017-10-23 | Stop reason: SDUPTHER

## 2017-06-28 RX ORDER — LINAGLIPTIN 5 MG/1
TABLET, FILM COATED ORAL
Qty: 30 TAB | Refills: 3 | Status: SHIPPED | OUTPATIENT
Start: 2017-06-28 | End: 2017-09-19

## 2017-07-07 RX ORDER — LINAGLIPTIN 5 MG/1
TABLET, FILM COATED ORAL
Qty: 30 TAB | Refills: 3 | Status: SHIPPED | OUTPATIENT
Start: 2017-07-07 | End: 2017-12-21 | Stop reason: ALTCHOICE

## 2017-08-03 DIAGNOSIS — I10 ESSENTIAL HYPERTENSION: ICD-10-CM

## 2017-08-04 RX ORDER — ATENOLOL 50 MG/1
TABLET ORAL
Qty: 30 TAB | Refills: 3 | Status: SHIPPED | OUTPATIENT
Start: 2017-08-04 | End: 2017-11-25 | Stop reason: SDUPTHER

## 2017-08-17 ENCOUNTER — HOSPITAL ENCOUNTER (OUTPATIENT)
Dept: SLEEP MEDICINE | Age: 42
Discharge: HOME | End: 2017-08-17
Attending: INTERNAL MEDICINE
Payer: COMMERCIAL

## 2017-08-17 ENCOUNTER — TELEPHONE (OUTPATIENT)
Dept: INTERNAL MEDICINE CLINIC | Age: 42
End: 2017-08-17

## 2017-08-17 VITALS
TEMPERATURE: 98.8 F | SYSTOLIC BLOOD PRESSURE: 137 MMHG | HEART RATE: 83 BPM | WEIGHT: 315 LBS | OXYGEN SATURATION: 97 % | BODY MASS INDEX: 42.66 KG/M2 | HEIGHT: 72 IN | DIASTOLIC BLOOD PRESSURE: 86 MMHG

## 2017-08-17 DIAGNOSIS — G47.33 OSA (OBSTRUCTIVE SLEEP APNEA): ICD-10-CM

## 2017-08-17 PROCEDURE — 95811 POLYSOM 6/>YRS CPAP 4/> PARM: CPT | Performed by: INTERNAL MEDICINE

## 2017-08-17 RX ORDER — INSULIN GLARGINE 100 [IU]/ML
INJECTION, SOLUTION SUBCUTANEOUS
Qty: 1 PEN | Refills: 3 | Status: SHIPPED | OUTPATIENT
Start: 2017-08-17 | End: 2017-12-06 | Stop reason: SDUPTHER

## 2017-08-18 ENCOUNTER — TELEPHONE (OUTPATIENT)
Dept: SLEEP MEDICINE | Age: 42
End: 2017-08-18

## 2017-08-18 DIAGNOSIS — G47.33 OSA (OBSTRUCTIVE SLEEP APNEA): Primary | ICD-10-CM

## 2017-08-22 ENCOUNTER — DOCUMENTATION ONLY (OUTPATIENT)
Dept: SLEEP MEDICINE | Age: 42
End: 2017-08-22

## 2017-08-22 NOTE — PROGRESS NOTES
This note is being routed to Dr. Edie Jordan. Sleep Medicine consult note and sleep study report in patient's chart for review.     Thank you for the referral.

## 2017-08-22 NOTE — TELEPHONE ENCOUNTER
Orders Placed This Encounter    AMB SUPPLY ORDER     Diagnosis: (G47.33) JENNIFER (obstructive sleep apnea)  (primary encounter diagnosis)     Positive Airway Pressure Therapy: Duration of need: 99 months. Respironics DreamStation ( Unit - CPAP Mode):   CPAP: 7 cmH2O; CPAP Check enabled. Ramp Time: 30 Minutes; Flex: 2. Remote monitoring enrollment.  Heated Humidifier     Oral/Nasal Combo Mask 1 every 3 months.  Oral Cushion Combo Mask (Replace) 2 per month.  Nasal Pillows Combo Mask (Replace) 2 per month.  Full Face Mask 1 every 3 months.  Full Face Mask Cushion 1 per month.  Nasal Cushion (Replace) 2 per month.  Nasal Pillows (Replace) 2 per month.  Nasal Interface Mask 1 every 3 months.  Headgear 1 every 6 months.  Chinstrap 1 every 6 months.  Tubing 1 every 3 months.  Filter(s) Disposable 2 per month.  Filter(s) Non-Disposable 1 every 6 months.  Oral Interface 1 every 3 months. [address] for [employer] (Replace) 1 every 6 months.  Tubing with heating element 1 every 3 months. Perform Mask Fitting per patient preference and comfort - replace as above. Naomi Mckeon MD, [facility]; NPI: [id number]  Electronically signed.  08/22/17

## 2017-09-08 RX ORDER — DILTIAZEM HYDROCHLORIDE 240 MG/1
CAPSULE, EXTENDED RELEASE ORAL
Qty: 30 CAP | Refills: 5 | Status: SHIPPED | OUTPATIENT
Start: 2017-09-08 | End: 2018-02-25 | Stop reason: SDUPTHER

## 2017-09-19 ENCOUNTER — OFFICE VISIT (OUTPATIENT)
Dept: CARDIOLOGY CLINIC | Age: 42
End: 2017-09-19

## 2017-09-19 VITALS
OXYGEN SATURATION: 98 % | SYSTOLIC BLOOD PRESSURE: 130 MMHG | BODY MASS INDEX: 42.66 KG/M2 | WEIGHT: 315 LBS | DIASTOLIC BLOOD PRESSURE: 90 MMHG | HEART RATE: 68 BPM | RESPIRATION RATE: 16 BRPM | HEIGHT: 72 IN

## 2017-09-19 DIAGNOSIS — E66.01 MORBID OBESITY, UNSPECIFIED OBESITY TYPE (HCC): ICD-10-CM

## 2017-09-19 DIAGNOSIS — I10 ESSENTIAL HYPERTENSION: Primary | ICD-10-CM

## 2017-09-19 DIAGNOSIS — G89.29 CHRONIC BILATERAL LOW BACK PAIN WITH BILATERAL SCIATICA: ICD-10-CM

## 2017-09-19 DIAGNOSIS — E78.5 DYSLIPIDEMIA, GOAL LDL BELOW 100: ICD-10-CM

## 2017-09-19 DIAGNOSIS — I10 ESSENTIAL HYPERTENSION WITH GOAL BLOOD PRESSURE LESS THAN 130/80: ICD-10-CM

## 2017-09-19 DIAGNOSIS — M54.42 CHRONIC BILATERAL LOW BACK PAIN WITH BILATERAL SCIATICA: ICD-10-CM

## 2017-09-19 DIAGNOSIS — M54.41 CHRONIC BILATERAL LOW BACK PAIN WITH BILATERAL SCIATICA: ICD-10-CM

## 2017-09-19 RX ORDER — CHLORTHALIDONE 25 MG/1
12.5 TABLET ORAL DAILY
Qty: 30 TAB | Refills: 6 | Status: SHIPPED | OUTPATIENT
Start: 2017-09-19 | End: 2018-09-22 | Stop reason: SDUPTHER

## 2017-09-19 NOTE — PROGRESS NOTES
Rissa Espinoza MD. [facility]              Patient: Valentino Krishnan  : 1975      Today's Date: 2017          HISTORY OF PRESENT ILLNESS:     History of Present Illness:  Mr. Clarence Murdock is here for follow-up. He has chronic pain. Has some family problems causing stress. He gets sharp pains sometimes (right and left chest) when he does lifting or cleaning - lasts just for a second and goes away by itself. Also when he is stressed out and has panic attacks, he breaks out in hives and chest pressure. BP has been fair. PAST MEDICAL HISTORY:     Past Medical History:   Diagnosis Date    Arthritis     Asthma     Chronic back pain 2011    Dr. Justen Sexton - pain management    Depression     Diabetes ([facility] [address]     Dyslipidemia     HTN (hypertension)     Morbid obesity ([facility] [address]     JENNIFER (obstructive sleep apnea) 2012    Psychotic disorder        Past Surgical History:   Procedure Laterality Date    IR INJ EPIDURAL LUMBAR SACRAL             MEDICATIONS:     Current Outpatient Prescriptions   Medication Sig Dispense Refill    CARTIA  mg ER capsule take 1 capsule by mouth once daily 30 Cap 5    insulin glargine (BASAGLAR KWIKPEN) 100 unit/mL (3 mL) inpn Pt to inject 40 units once nightly please provide a 30 day supply 1 Pen 3    atenolol (TENORMIN) 50 mg tablet take 1 tablet by mouth once daily 30 Tab 3    TRADJENTA 5 mg tablet take 1 tablet once daily 30 Tab 3    lisinopril (PRINIVIL, ZESTRIL) 40 mg tablet take 1 tablet by mouth once daily 30 Tab 3    hydroCHLOROthiazide (HYDRODIURIL) 25 mg tablet take 1 tablet by mouth once daily 30 Tab 3    glipiZIDE SR (GLUCOTROL XL) 5 mg CR tablet take 1 tablet by mouth once daily 30 Tab 5    aspirin delayed-release 81 mg tablet Take 81 mg by mouth daily.  simvastatin (ZOCOR) 20 mg tablet take 1 tablet every evening 30 Tab 5    spironolactone (ALDACTONE) 25 mg tablet Take 1 Tab by mouth daily.  30 Tab 12    omeprazole (PRILOSEC) 20 mg capsule take 1 capsule by mouth once daily 30 Cap 5    Lancets misc Pt request One Touch Delica to test blood sugars three time daily 1 Each 11    DULoxetine (CYMBALTA) 30 mg capsule Take 30 mg by mouth two (2) times a day.  0    ONETOUCH ULTRA TEST strip TEST three times a day 100 Strip 5    Blood-Glucose Meter monitoring kit Pt needs One Touch Ultra glucometer to test blood sugars three times daily E11.9 1 Kit 0    albuterol (PROAIR HFA) 90 mcg/actuation inhaler Take 1 Puff by inhalation every four (4) hours as needed for Wheezing or Shortness of Breath. 1 Inhaler 1    fentaNYL (DURAGESIC) 50 mcg/hr PATCH 1 Patch by TransDERmal route every seventy-two (72) hours.  fexofenadine (ALLEGRA) 180 mg tablet Take 180 mg by mouth daily.  cyclobenzaprine (FLEXERIL) 10 mg tablet Take  by mouth three (3) times daily as needed for Muscle Spasm(s).  meloxicam (MOBIC) 7.5 mg tablet Take  by mouth daily.  tramadol (ULTRAM) 50 mg tablet Take 50 mg by mouth four (4) times daily. Allergies   Allergen Reactions    Latex Hives    Clindamycin-Benzoyl Peroxide Rash    Metformin Diarrhea             SOCIAL HISTORY:     Social History   Substance Use Topics    Smoking status: Never Smoker    Smokeless tobacco: Never Used    Alcohol use No         FAMILY HISTORY:     Family History   Problem Relation Age of Onset    Cancer Mother     Heart Disease Mother     Arthritis-osteo Mother     Diabetes Mother     Coronary Artery Disease Mother     Hypertension Father     Stroke Father           REVIEW OF SYMPTOMS:       Review of Symptoms:  Constitutional: Negative for fever  HEENT: Negative for nosebleeds, tinnitus, and vision changes. Respiratory: +MARCELINO  Cardiovascular: No recent syncope. Gastrointestinal: Negative for melena. Genitourinary: Negative for dysuria  Musculoskeletal: + joint pain / back pain   Skin: + rashes   Heme: No problems bleeding.   Neurological: Negative for speech change and focal weakness. + withdrawal from pain meds                   PHYSICAL EXAM:       Physical Exam:  Visit Vitals    /90 (BP 1 Location: Left arm, BP Patient Position: Sitting)    Pulse 68    Resp 16    Ht 5' 11.5\" (1.816 m)    Wt (!) 370 lb (167.8 kg)    SpO2 98%    BMI 50.89 kg/m2        Patient appears generally well, mood and affect are appropriate and pleasant. HEENT: Hearing intact, non-icteric, normocephalic, atraumatic. Neck Exam: Supple, No JVD or carotid bruits. Lung Exam: Clear to auscultation, even breath sounds. Cardiac Exam: Regular rate and rhythm with no murmur  Abdomen: Soft, non-tender, normal bowel sounds. + obese  Extremities: Moves all ext well. No lower extremity edema. Vascular: 2+ dorsalis pedis pulses bilaterally. Psych: Appropriate affect  Neuro - Grossly intact                  LABS / OTHER STUDIES:       Lab Results   Component Value Date/Time    Sodium 138 05/24/2017 12:33 PM    Potassium 4.6 05/24/2017 12:33 PM    Chloride 93 05/24/2017 12:33 PM    CO2 27 05/24/2017 12:33 PM    Anion gap 8 01/15/2010 11:03 AM    Glucose 143 05/24/2017 12:33 PM    BUN 10 05/24/2017 12:33 PM    Creatinine 0.78 05/24/2017 12:33 PM    BUN/Creatinine ratio 13 05/24/2017 12:33 PM    GFR est  05/24/2017 12:33 PM    GFR est non- 05/24/2017 12:33 PM    Calcium 9.9 05/24/2017 12:33 PM    Bilirubin, total 0.4 04/14/2017 10:34 AM    AST (SGOT) 21 04/14/2017 10:34 AM    Alk.  phosphatase 75 04/14/2017 10:34 AM    Protein, total 7.2 04/14/2017 10:34 AM    Albumin 4.3 04/14/2017 10:34 AM    Globulin 3.5 01/15/2010 11:03 AM    A-G Ratio 1.5 04/14/2017 10:34 AM    ALT (SGPT) 24 04/14/2017 10:34 AM     Component      Latest Ref Rng & Units 5/16/2017           9:40 AM   Aldosterone      0.0 - 30.0 ng/dL 2.2   Renin Activity      0.167 - 5.380 ng/mL/hr 0.281   Aldos/Renin Ratio      0.0 - 30.0 7.8                 CARDIAC DIAGNOSTICS:       Cardiac Evaluation Includes:  EKG 5/15/17 - NSR, normal       Treadmill Stress test 6/14/17 - sinus tach () at rest. Walked 8:10 on modified Cyrus (4.6 METS), stopping for SOB/knee pain. Reached 95% MPHR. No ischemic EKG changes. /86 at start of study.       Echo 6/14/17 - LVEF 55-60%, normal study       Holter 6/14/17 - HR , Avg 81. 1 PVC and no PAC's. Normal study. Symptoms correlate with sinus / sinus tach.               ASSESSMENT AND PLAN:       Assessment and Plan:  1) Atypical chest pain and palpitations  - Symptoms atypical for angina. Has nearly daily brief palpitations. - Treadmill Stress test 6/14/17 - sinus tach () at rest. Walked 8:10 on modified Cyrus (4.6 METS), stopping for SOB/knee pain. Reached 95% MPHR. No ischemic EKG changes. /86 at start of study. - Echo 6/14/17 - LVEF 55-60%, normal study   - Holter 6/14/17 was normal   - On 9/1/9/17 he is stable with atypical chest pain. If symptoms get worse, will consider further testing (exercise cardiolite vs cath).     2) Difficult to control HTN  - problems likely due to morbid obesity. Stan/renin level OK  - cont cardizem, atenolol, lisinopril, HCTZ, and aldactone   - BP looks better with addition of aldactone. - Recommended home monitor but he does not have finances for that right now. - Will switch to chlorthalidone 12.5 mg daily and recheck labs 2 weeks later       3) CV risk management   - His ASCVD 10 year risk is 13%  - discussed diet, exercise, weight loss  - cont statin and ASA      4) Obesity   - he has previously looked into weight loss surgery       5) Chronic pain  - managed by Dr. Lissette Maguire      6) See me in 4 weeks. Patient expressed understanding of the plan - questions were answered. On Disability after injury from [place] years ago. Single.  Enjoys singing.   [address] [facility] MD Isrrael, [facility]  [address] [place], [address]  [address]  [address], [address] [place] [address]  Ph: [phone]   Ph [phone]

## 2017-09-26 ENCOUNTER — OFFICE VISIT (OUTPATIENT)
Dept: INTERNAL MEDICINE CLINIC | Age: 42
End: 2017-09-26

## 2017-09-26 VITALS
DIASTOLIC BLOOD PRESSURE: 77 MMHG | TEMPERATURE: 99 F | BODY MASS INDEX: 42.66 KG/M2 | WEIGHT: 315 LBS | HEIGHT: 72 IN | RESPIRATION RATE: 14 BRPM | OXYGEN SATURATION: 98 % | HEART RATE: 78 BPM | SYSTOLIC BLOOD PRESSURE: 131 MMHG

## 2017-09-26 DIAGNOSIS — I10 ESSENTIAL HYPERTENSION: ICD-10-CM

## 2017-09-26 DIAGNOSIS — M54.41 CHRONIC BILATERAL LOW BACK PAIN WITH BILATERAL SCIATICA: ICD-10-CM

## 2017-09-26 DIAGNOSIS — G47.33 OSA (OBSTRUCTIVE SLEEP APNEA): ICD-10-CM

## 2017-09-26 DIAGNOSIS — G89.29 CHRONIC BILATERAL LOW BACK PAIN WITH BILATERAL SCIATICA: ICD-10-CM

## 2017-09-26 DIAGNOSIS — F34.1 DYSTHYMIA: ICD-10-CM

## 2017-09-26 DIAGNOSIS — E78.5 DYSLIPIDEMIA, GOAL LDL BELOW 100: ICD-10-CM

## 2017-09-26 DIAGNOSIS — M54.42 CHRONIC BILATERAL LOW BACK PAIN WITH BILATERAL SCIATICA: ICD-10-CM

## 2017-09-26 RX ORDER — DULOXETIN HYDROCHLORIDE 60 MG/1
CAPSULE, DELAYED RELEASE ORAL
Refills: 0 | COMMUNITY
Start: 2017-09-19 | End: 2021-06-30 | Stop reason: SDUPTHER

## 2017-09-26 NOTE — PROGRESS NOTES
CC:  Diabetes management/education    S/O: Argentina Clement is a 38 y.o. male referred by Dr. Holly Rangel MD for diabetes management. HPI: Patient presents today to see both Dr. Mary Anne Ridley and myself for diabetes management. Patient was last seen 6/9/17 where we spent a long time talking about diet, diabetes and prevention of diabetes related complications. Current Diabetes Regimen:  Glipizide ER 5mg every morning  Lantus 40-42 units once daily depending on how his sugar is  Tradjenta 5mg daily  Jardiance 25mg daily      ROS:   Patient denies hypoglycemic and hyperglycemic signs/symptoms, chest pain, shortness of breath, neuropathy, vision changes, and any foot changes. Self-Monitoring Blood Glucose:        Diet:  Patient states that he has tried to really limit carbohydrates and is trying to look at labels when purchasing foods. His diet is limited by his financial situation and he tries to get healthy food when he can. He is not interested in a moderation approach to diet and instead would prefer to eliminate all foods he considers \"bad\" in his diet.        Exercise:   -goes to the gym 3-4 times a week but is limited by chronic back pain for which he is on pain medications      Past Medical History:   Diagnosis Date    Arthritis     Asthma     Chronic back pain 04/11/2011    Dr. Maisha Juarez - pain management    Depression     Diabetes ([address]     Dyslipidemia     HTN (hypertension)     Morbid obesity ([address]     JENNIFER (obstructive sleep apnea) 4/12/2012    Psychotic disorder      Past Surgical History:   Procedure Laterality Date    IR INJ EPIDURAL LUMBAR SACRAL       Family History   Problem Relation Age of Onset    Cancer Mother     Heart Disease Mother     Arthritis-osteo Mother     Diabetes Mother     Coronary Artery Disease Mother     Hypertension Father     Stroke Father      Social History     Social History    Marital status: SINGLE     Spouse name: N/A    Number of children: N/A    Years of education: N/A     Social History Main Topics    Smoking status: Never Smoker    Smokeless tobacco: Never Used    Alcohol use No    Drug use: No    Sexual activity: Not Currently     Partners: Female     Other Topics Concern    Not on file     Social History Narrative     Allergies   Allergen Reactions    Latex Hives    Clindamycin-Benzoyl Peroxide Rash    Metformin Diarrhea     Current Outpatient Prescriptions   Medication Sig    DULoxetine (CYMBALTA) 60 mg capsule take 1 capsule by mouth once daily    chlorthalidone (HYGROTEN) 25 mg tablet Take 0.5 Tabs by mouth daily.  CARTIA  mg ER capsule take 1 capsule by mouth once daily    insulin glargine (BASAGLAR KWIKPEN) 100 unit/mL (3 mL) inpn Pt to inject 40 units once nightly please provide a 30 day supply    atenolol (TENORMIN) 50 mg tablet take 1 tablet by mouth once daily    TRADJENTA 5 mg tablet take 1 tablet once daily    lisinopril (PRINIVIL, ZESTRIL) 40 mg tablet take 1 tablet by mouth once daily    glipiZIDE SR (GLUCOTROL XL) 5 mg CR tablet take 1 tablet by mouth once daily    aspirin delayed-release 81 mg tablet Take 81 mg by mouth daily.  simvastatin (ZOCOR) 20 mg tablet take 1 tablet every evening    spironolactone (ALDACTONE) 25 mg tablet Take 1 Tab by mouth daily.  omeprazole (PRILOSEC) 20 mg capsule take 1 capsule by mouth once daily    Lancets misc Pt request One Touch Delica to test blood sugars three time daily    ONETOUCH ULTRA TEST strip TEST three times a day    Blood-Glucose Meter monitoring kit Pt needs One Touch Ultra glucometer to test blood sugars three times daily E11.9    albuterol (PROAIR HFA) 90 mcg/actuation inhaler Take 1 Puff by inhalation every four (4) hours as needed for Wheezing or Shortness of Breath.  fentaNYL (DURAGESIC) 50 mcg/hr PATCH 1 Patch by TransDERmal route every seventy-two (72) hours.  fexofenadine (ALLEGRA) 180 mg tablet Take 180 mg by mouth daily.     cyclobenzaprine (FLEXERIL) 10 mg tablet Take  by mouth three (3) times daily as needed for Muscle Spasm(s).  tramadol (ULTRAM) 50 mg tablet Take 50 mg by mouth four (4) times daily.  meloxicam (MOBIC) 7.5 mg tablet Take  by mouth daily. No current facility-administered medications for this visit. There were no vitals taken for this visit. Data reviewed:  Lab Results   Component Value Date/Time    Hemoglobin A1c 9.5 04/14/2017 10:34 AM    Hemoglobin A1c (POC) 6.8 02/03/2014 11:30 AM     Lab Results   Component Value Date/Time    Cholesterol, total 166 04/14/2017 10:34 AM    HDL Cholesterol 52 04/14/2017 10:34 AM    LDL, calculated 100 04/14/2017 10:34 AM    VLDL, calculated 14 04/14/2017 10:34 AM    Triglyceride 70 04/14/2017 10:34 AM     Lab Results   Component Value Date/Time    ALT (SGPT) 24 04/14/2017 10:34 AM    AST (SGOT) 21 04/14/2017 10:34 AM    Alk. phosphatase 75 04/14/2017 10:34 AM    Bilirubin, total 0.4 04/14/2017 10:34 AM     Lab Results   Component Value Date/Time    Creatinine 0.78 05/24/2017 12:33 PM     Lab Results   Component Value Date/Time    Microalb/Creat ratio (ug/mg creat.) 8.1 04/14/2017 10:34 AM       Diabetes Health Maintenance:  Last eye exam: due now (need to confirm with patient)  Last foot exam: 4/14/17  Last influenza vaccine: 9/9/16  Last Pneumovax 23 vaccine: unknown  Last Prevnar-13 vaccine: NA  Hepatitis B Series: unknown  ASA Therapy: ASA 81mg - not taking because forgets to purchase  ACE/ARB Therapy: Lisinopril 40mg daily  Statin Therapy: Simvastatin 20mg daily    Assessment/Plan:     1. Diabetes:  a1c not at goal <7% although has slightly improved over the past few months. Patient will need closer follow up over the next few months in order for us to make some progress. Will call him in a week to discuss blood sugars and come up with a plan to get his a1c down. He may benefit from GLP-1 - if we go this route, would d/c Tradjenta.      Patient verbalized understanding of the information presented and all of the patients questions were answered. AVS was handed to the patient and information reviewed. Patient advised to call me or Dr. Lelia Morales MD with any additional questions or concerns. Follow-up: 3 months in office-- 1 month by phone   Notification of recommendations will be sent to Dr. Lelia Morales MD for review.     Thank you for the consult,  Chelo Osborne, DominickD, BCPS, CDE    Time spent with the patient:  45  Time spent documenting: 15

## 2017-09-26 NOTE — PROGRESS NOTES
HISTORY OF PRESENT ILLNESS  Ben Suarez is a 38 y.o. male. HPI   Depression: Patient reports he will follow up with counseling. He states he has been implementing strategic approaches to keep his mind occupied and off of the depression. He states his triggers have not been helping which led him to follow up with counselor. Chronic back pain: Patient continues on fentanyl patches for back pain. JENNIFER: Patient reports he has started CPAP, but has not noticed difference in alertness or quality of sleep. Diabetic Review of Systems - medication compliance: compliant all of the time, diabetic diet compliance: compliant most of the time, home glucose monitoring: is performed regularly, further diabetic ROS: no polyuria or polydipsia, no chest pain, dyspnea or TIA's, no numbness, tingling or pain in extremities. Other symptoms and concerns: 40 units; 140-170/180 in morning; bedtime: 160s-mid 200s. Last A1C was 9.5%  Hypertension ROS: taking medications as instructed, no medication side effects noted, no TIA's, no chest pain on exertion, no dyspnea on exertion, no swelling of ankles. New concerns:  Patient's BP in office today is 131/77. Patient continues on Spirolactone and altenolol. Patient reports he followed up wit Dr. Giuliana Aguiar yesterday. He was put on chlorthalidone 12.5 mg . Hyperlipidemia:  Cardiovascular risk analysis - 38 y.o. male LDL goal is under 100. ROS: taking medications as instructed, no medication side effects noted, no TIA's, no chest pain on exertion, no dyspnea on exertion, no swelling of ankles. Tolerating meds, no myalgias or other side effects noted  New concerns: Last LDL was 100 and cholesterol total was 166 on 4/2017. Review of Systems   All other systems reviewed and are negative. Physical Exam   Constitutional: He is oriented to person, place, and time. He appears well-developed and well-nourished. HENT:   Head: Normocephalic and atraumatic.    Right Ear: External ear normal. Left Ear: External ear normal.   Nose: Nose normal.   Mouth/Throat: Oropharynx is clear and moist.   Eyes: Conjunctivae and EOM are normal.   Neck: Normal range of motion. Neck supple. Cardiovascular: Normal rate, regular rhythm, normal heart sounds and intact distal pulses. Pulmonary/Chest: Effort normal and breath sounds normal.   Abdominal: Soft. Bowel sounds are normal.   Genitourinary: Testes normal.   Musculoskeletal: Normal range of motion. Neurological: He is alert and oriented to person, place, and time. Skin: Skin is warm and dry. Psychiatric: He has a normal mood and affect. His behavior is normal. Judgment and thought content normal.   Nursing note and vitals reviewed. ASSESSMENT and PLAN  Diagnoses and all orders for this visit:    1. Uncontrolled type 2 diabetes mellitus with complication, with long-term current use of insulin (HCC)  If sugar levels below 150 the evening before, instructed patient to take 10-20 units of insulin  But if higher than than to take regular dose of insulin as he is worried about it dropping too low Continue to watch diet and monitor sugars. Last A1C was 9.5%. -     HEMOGLOBIN A1C WITH EAG    2. Essential hypertension  BP is at goal. I do not recommend any change in medications. Continue to follow up with Dr. Jeri Hope.    3. JENNIFER (obstructive sleep apnea)  Stable. Continue on CPAP. 4. Dyslipidemia, goal LDL below 100  Stable, patient tolerating meds, no myalgias. I do not recommend any change in medications. 5. Chronic bilateral low back pain with bilateral sciatica  Stable - continue on fentnyl patches. Followed by     6. Dysthymia   Continue to follow up with counseling and utilize mindulness.  And taking care of himself    lab results and schedule of future lab studies reviewed with patient  reviewed diet, exercise and weight control    Written by Sammi Velazquez, as dictated by Fito Bah MD.     Current diagnosis and concerns discussed with pt at length. Understands risks and benefits or current treatment plan and medications and accepts the treatment and medication with any possible risks. Pt asks appropriate questions which were answered. Pt instructed to call with any concerns or problems.

## 2017-09-26 NOTE — Clinical Note
This is my note from the other day-- I've already called him to follow-up on his labwork and let him know that we need to come up with a plan to get his a1c down. I'm going to keep him on my weekly follow-up schedule until we figure something out. Thanks!

## 2017-09-27 LAB
EST. AVERAGE GLUCOSE BLD GHB EST-MCNC: 214 MG/DL
HBA1C MFR BLD: 9.1 % (ref 4.8–5.6)

## 2017-10-06 ENCOUNTER — OFFICE VISIT (OUTPATIENT)
Dept: INTERNAL MEDICINE CLINIC | Age: 42
End: 2017-10-06

## 2017-10-06 VITALS
HEART RATE: 99 BPM | BODY MASS INDEX: 42.66 KG/M2 | HEIGHT: 72 IN | SYSTOLIC BLOOD PRESSURE: 144 MMHG | DIASTOLIC BLOOD PRESSURE: 91 MMHG | OXYGEN SATURATION: 98 % | WEIGHT: 315 LBS | TEMPERATURE: 99.6 F | RESPIRATION RATE: 16 BRPM

## 2017-10-06 DIAGNOSIS — M25.532 LEFT WRIST PAIN: Primary | ICD-10-CM

## 2017-10-06 DIAGNOSIS — I10 ESSENTIAL HYPERTENSION: ICD-10-CM

## 2017-10-06 RX ORDER — GABAPENTIN 300 MG/1
300 CAPSULE ORAL 3 TIMES DAILY
Qty: 90 CAP | Refills: 1 | Status: SHIPPED | OUTPATIENT
Start: 2017-10-06 | End: 2017-12-05 | Stop reason: SDUPTHER

## 2017-10-06 RX ORDER — KETOROLAC TROMETHAMINE 10 MG/1
TABLET, FILM COATED ORAL
COMMUNITY
End: 2017-11-28 | Stop reason: SDUPTHER

## 2017-10-06 NOTE — PROGRESS NOTES
HISTORY OF PRESENT ILLNESS  Davida Bell is a 38 y.o. male. HPI   Patient reports Sunday night he went to bed normal, but Monday he woke up with left arm pain. Patient notes he waited 3 days until he went to ER. He states the pain is at his wrist and radiates up his arm. He states he can not close his left hand. Patient reports he usually has intermittent pain in right arm. Patient reports his left arm feels numb, but painful. He states he was given a Toradol injection in ER. He states hs back improved but the injection did not help his arm. Diabetic Review of Systems - medication compliance: compliant all of the time, diabetic diet compliance: compliant all of the time, home glucose monitoring: is performed regularly, further diabetic ROS: no polyuria or polydipsia, no chest pain, dyspnea or TIA's, no numbness, tingling or pain in extremities. Other symptoms and concerns: Patient reports he has been on 42 units of insulin. He states he had to increase to 44 units after a spike in sugars after drinking sugar free smoothie. Hypertension ROS: taking medications as instructed, no medication side effects noted, no TIA's, no chest pain on exertion, no dyspnea on exertion, no swelling of ankles. New concerns:  Patient's BP in office today is 144/91. Patient did not take medication this morning. Review of Systems   All other systems reviewed and are negative. Physical Exam   Constitutional: He is oriented to person, place, and time. He appears well-developed and well-nourished. HENT:   Head: Normocephalic and atraumatic. Right Ear: External ear normal.   Left Ear: External ear normal.   Nose: Nose normal.   Mouth/Throat: Oropharynx is clear and moist.   Eyes: Conjunctivae and EOM are normal.   Neck: Normal range of motion. Neck supple. Cardiovascular: Normal rate, regular rhythm, normal heart sounds and intact distal pulses. Pulmonary/Chest: Effort normal and breath sounds normal.   Abdominal: Soft. Bowel sounds are normal.   Genitourinary: Testes normal.   Musculoskeletal: Normal range of motion. Neurological: He is alert and oriented to person, place, and time. Skin: Skin is warm and dry. Psychiatric: He has a normal mood and affect. His behavior is normal. Judgment and thought content normal.   Nursing note and vitals reviewed. ASSESSMENT and PLAN  Diagnoses and all orders for this visit:    1. Left wrist pain  Continue to take toradol ( mobic held) with gabapentin. Patient will follow up with ortho. -     [facility]  -     gabapentin (NEURONTIN) 300 mg capsule; Take 1 Cap by mouth three (3) times daily. 2. Uncontrolled type 2 diabetes mellitus with complication, with long-term current use of insulin (HCC)  Continue current medications. Patient will continue on diabetic diet. He knows to increase his insulin and take it consistently     3. Essential hypertension  /91 today in office. Not concerned, patient is in a lot of pain. Continue to monitor. He did nto take his meds yet today either     lab results and schedule of future lab studies reviewed with patient  reviewed diet, exercise and weight control    Written by Benjamin Moore, as dictated by Mu Williamson MD.     Current diagnosis and concerns discussed with pt at length. Understands risks and benefits or current treatment plan and medications and accepts the treatment and medication with any possible risks. Pt asks appropriate questions which were answered. Pt instructed to call with any concerns or problems.

## 2017-10-10 RX ORDER — ALBUTEROL SULFATE 90 UG/1
AEROSOL, METERED RESPIRATORY (INHALATION)
Qty: 18 INHALER | Refills: 1 | Status: SHIPPED | OUTPATIENT
Start: 2017-10-10 | End: 2019-07-09 | Stop reason: SDUPTHER

## 2017-10-12 ENCOUNTER — TELEPHONE (OUTPATIENT)
Dept: INTERNAL MEDICINE CLINIC | Age: 42
End: 2017-10-12

## 2017-10-12 NOTE — TELEPHONE ENCOUNTER
CC: Phone follow-up for diabetes management    S/O: Placed an outgoing call to patient (2 identifiers used) to follow-up on diabetes. Patients a1c had only slightly improved from 9.5 to 9.1. Current Diabetes Regimen:  Basaglar 42 units once daily  Tradjenta 5mg daily  Glipizide ER 5mg daily    Blood Sugars:  Fastings: 141, 178, 165  No low blood sugars    Diet/Exercise:  Plans to start going to the gym- is worried about low blood sugars  Has been having the pain in his wrist/hand so this has been a barrier to any activity lately  When he goes to the gym usually walks on the treadmill for ~ 30 minutes    A/P:    Diabetes: a1c not at goal <7% and really hasn't decreased much over the past ~6 months. Advised patient to increase to 44 units once daily. Patient states that he has had low sugars in the past when he doesn't eat and works out. Encouraged him to NOT skip meals when he's working out and to take a snack with him. Reviewed with patient signs/sx/treatment of hypoglycemia. Patient to call me if sugars start running low (<80). He may be a great candidate for the glp-1/basal insulin combination (ex. Estrella Meyers). If we went this route, would d/c the tradjenta. Will follow up with patient in 1 week to see how sugars are. Patient will likely require really close follow up in order to get blood sugars to goal.      Patient verbalized understanding all information presented. Notification of recommendations will be sent to Dr. Peng Trottero for review.     Hakan Cifuentes, PharmD, BCPS, CDE

## 2017-10-20 LAB
BUN SERPL-MCNC: 11 MG/DL (ref 6–24)
BUN/CREAT SERPL: 14 (ref 9–20)
CALCIUM SERPL-MCNC: 10.1 MG/DL (ref 8.7–10.2)
CHLORIDE SERPL-SCNC: 96 MMOL/L (ref 96–106)
CO2 SERPL-SCNC: 28 MMOL/L (ref 18–29)
CREAT SERPL-MCNC: 0.77 MG/DL (ref 0.76–1.27)
GFR SERPLBLD CREATININE-BSD FMLA CKD-EPI: 114 ML/MIN/1.73
GFR SERPLBLD CREATININE-BSD FMLA CKD-EPI: 131 ML/MIN/1.73
GLUCOSE SERPL-MCNC: 97 MG/DL (ref 65–99)
POTASSIUM SERPL-SCNC: 4.4 MMOL/L (ref 3.5–5.2)
SODIUM SERPL-SCNC: 138 MMOL/L (ref 134–144)

## 2017-10-23 DIAGNOSIS — I10 ESSENTIAL HYPERTENSION WITH GOAL BLOOD PRESSURE LESS THAN 130/80: ICD-10-CM

## 2017-10-23 RX ORDER — LISINOPRIL 40 MG/1
TABLET ORAL
Qty: 30 TAB | Refills: 3 | Status: SHIPPED | OUTPATIENT
Start: 2017-10-23 | End: 2018-02-12 | Stop reason: SDUPTHER

## 2017-10-23 RX ORDER — HYDROCHLOROTHIAZIDE 25 MG/1
TABLET ORAL
Qty: 30 TAB | Refills: 3 | Status: SHIPPED | OUTPATIENT
Start: 2017-10-23 | End: 2018-02-12 | Stop reason: SDUPTHER

## 2017-10-29 RX ORDER — OMEPRAZOLE 20 MG/1
CAPSULE, DELAYED RELEASE ORAL
Qty: 30 CAP | Refills: 5 | Status: SHIPPED | OUTPATIENT
Start: 2017-10-29 | End: 2018-04-16 | Stop reason: SDUPTHER

## 2017-10-30 ENCOUNTER — TELEPHONE (OUTPATIENT)
Dept: CARDIOLOGY CLINIC | Age: 42
End: 2017-10-30

## 2017-10-30 NOTE — TELEPHONE ENCOUNTER
MD Zabrina Jones, RN        Caller: Unspecified (Today, 12:54 PM)                     Well, I wanted to see him to make sure we got better control of his BP.  Once BP is controlled I'll see him just twice a year. Fiordaliza Lim he wants to reschedule for tomorrow and come back in a couple of months, that would be OK as well.    Thanks,   SK

## 2017-10-30 NOTE — TELEPHONE ENCOUNTER
Pt has an appt scheduled for tomorrow and said his lab results were normal and he would like to know if he can r/s this appt for later or if he needs to come tomorrow. Pt can be reached at [phone].       Thank you,  Marielena Corrales

## 2017-11-03 ENCOUNTER — TELEPHONE (OUTPATIENT)
Dept: INTERNAL MEDICINE CLINIC | Age: 42
End: 2017-11-03

## 2017-11-03 NOTE — TELEPHONE ENCOUNTER
Called patient to discuss the option of using Soliqua to help get his sugars under control. Left message for patient to return my call.     Domenica Smith, DominickD, BCPS, CDE

## 2017-11-25 DIAGNOSIS — I10 ESSENTIAL HYPERTENSION: ICD-10-CM

## 2017-11-25 RX ORDER — SIMVASTATIN 20 MG/1
TABLET, FILM COATED ORAL
Qty: 30 TAB | Refills: 5 | Status: SHIPPED | OUTPATIENT
Start: 2017-11-25 | End: 2018-03-23

## 2017-11-27 RX ORDER — ATENOLOL 50 MG/1
TABLET ORAL
Qty: 30 TAB | Refills: 3 | Status: SHIPPED | OUTPATIENT
Start: 2017-11-27 | End: 2018-03-18 | Stop reason: SDUPTHER

## 2017-11-28 ENCOUNTER — TELEPHONE (OUTPATIENT)
Dept: INTERNAL MEDICINE CLINIC | Age: 42
End: 2017-11-28

## 2017-11-28 DIAGNOSIS — M25.532 LEFT WRIST PAIN: ICD-10-CM

## 2017-11-28 RX ORDER — KETOROLAC TROMETHAMINE 10 MG/1
10 TABLET, FILM COATED ORAL
Qty: 60 TAB | Refills: 3 | Status: SHIPPED | OUTPATIENT
Start: 2017-11-28 | End: 2019-04-23 | Stop reason: SDUPTHER

## 2017-11-28 NOTE — TELEPHONE ENCOUNTER
Pt is asking for a refill on Ketrorlac 10 mg 1 tab by mouth every 6-8hrs as needed for pain for pt's carpal tunnel  . Duane Liming originally prescribed rx on  10/04/2017. [facility] -  [id number].     Phone [phone]

## 2017-12-05 DIAGNOSIS — M25.532 LEFT WRIST PAIN: ICD-10-CM

## 2017-12-06 RX ORDER — GABAPENTIN 300 MG/1
CAPSULE ORAL
Qty: 90 CAP | Refills: 1 | Status: SHIPPED | OUTPATIENT
Start: 2017-12-06 | End: 2018-02-27 | Stop reason: SDUPTHER

## 2017-12-19 RX ORDER — GLIPIZIDE 5 MG/1
TABLET, FILM COATED, EXTENDED RELEASE ORAL
Qty: 30 TAB | Refills: 5 | Status: SHIPPED | OUTPATIENT
Start: 2017-12-19 | End: 2018-05-29 | Stop reason: SDUPTHER

## 2017-12-21 ENCOUNTER — OFFICE VISIT (OUTPATIENT)
Dept: INTERNAL MEDICINE CLINIC | Age: 42
End: 2017-12-21

## 2017-12-21 VITALS
OXYGEN SATURATION: 98 % | TEMPERATURE: 98.7 F | HEIGHT: 72 IN | SYSTOLIC BLOOD PRESSURE: 145 MMHG | RESPIRATION RATE: 14 BRPM | HEART RATE: 79 BPM | DIASTOLIC BLOOD PRESSURE: 71 MMHG

## 2017-12-21 DIAGNOSIS — E78.5 DYSLIPIDEMIA, GOAL LDL BELOW 100: ICD-10-CM

## 2017-12-21 DIAGNOSIS — G89.29 CHRONIC BILATERAL LOW BACK PAIN WITH BILATERAL SCIATICA: ICD-10-CM

## 2017-12-21 DIAGNOSIS — R21 RASH: ICD-10-CM

## 2017-12-21 DIAGNOSIS — G56.03 BILATERAL CARPAL TUNNEL SYNDROME: ICD-10-CM

## 2017-12-21 DIAGNOSIS — M54.42 CHRONIC BILATERAL LOW BACK PAIN WITH BILATERAL SCIATICA: ICD-10-CM

## 2017-12-21 DIAGNOSIS — F33.9 RECURRENT DEPRESSION (HCC): ICD-10-CM

## 2017-12-21 DIAGNOSIS — M54.41 CHRONIC BILATERAL LOW BACK PAIN WITH BILATERAL SCIATICA: ICD-10-CM

## 2017-12-21 DIAGNOSIS — I10 ESSENTIAL HYPERTENSION: ICD-10-CM

## 2017-12-21 RX ORDER — KETOCONAZOLE 20 MG/G
CREAM TOPICAL DAILY
COMMUNITY
End: 2019-07-18

## 2017-12-21 RX ORDER — TRIAMCINOLONE ACETONIDE 1 MG/G
OINTMENT TOPICAL 2 TIMES DAILY
COMMUNITY
End: 2018-07-24 | Stop reason: SDUPTHER

## 2017-12-21 NOTE — PROGRESS NOTES
CC:  Diabetes management/education    S/O: Maynor Holt is a 38 y.o. male referred by Dr. Carisa Tracey MD for diabetes management. HPI: Patient here for follow up. [facility] to [facility] visit but he never returned the call. He said he had a rough time because about 1 month ago his cat  unexpectedly. He called his cat his \"son\" and has been really upset about that. He's been going to counseling on  and group meetings on . He has been taking care of his mom forever and recently she has a great nurse taking care of her so he has had more time to himself. He says this is good and bad because now he has more time and this is when he spends time thinking and being depressed. Current Diabetes Regimen:  Lantus 40 units once daily at night  Tradjenta 5mg once daily  Glipizide ER 5mg once daily    ROS:   Patient denies hypoglycemic and hyperglycemic signs/symptoms, chest pain, shortness of breath, neuropathy, vision changes, and any foot changes. Self-Monitoring Blood Glucose:  Did not bring log and hasn't really been checking    Diet:  No changes, if anything has been worse lately because of increased depression with loss of cat    Exercise:  Hasn't been exercising much because of the depression with the cat      Past Medical History:   Diagnosis Date    Arthritis     Asthma     Chronic back pain 2011    Dr. Zoey Deleon - pain management    Depression     Diabetes ([facility] [address]     Dyslipidemia     HTN (hypertension)     Morbid obesity ([facility] [address]     JENNIFER (obstructive sleep apnea) 2012    Psychotic disorder      Allergies   Allergen Reactions    Latex Hives    Clindamycin-Benzoyl Peroxide Rash    Metformin Diarrhea     Current Outpatient Prescriptions   Medication Sig    ketoconazole (NIZORAL) 2 % topical cream Apply  to affected area daily.  triamcinolone acetonide (KENALOG) 0.1 % ointment Apply  to affected area two (2) times a day.  use thin layer    insulin glargine-lixisenatide (SOLIQUA 100/33) 100 unit-33 mcg/mL inpn 30 Units by SubCUTAneous route daily.  glipiZIDE SR (GLUCOTROL XL) 5 mg CR tablet take 1 tablet by mouth once daily    gabapentin (NEURONTIN) 300 mg capsule take 1 capsule by mouth three times a day    ketorolac (TORADOL) 10 mg tablet Take 1 Tab by mouth every six (6) hours as needed for Pain.  atenolol (TENORMIN) 50 mg tablet take 1 tablet by mouth once daily    simvastatin (ZOCOR) 20 mg tablet take 1 tablet every evening    omeprazole (PRILOSEC) 20 mg capsule take 1 capsule by mouth once daily    lisinopril (PRINIVIL, ZESTRIL) 40 mg tablet take 1 tablet by mouth once daily    hydroCHLOROthiazide (HYDRODIURIL) 25 mg tablet take 1 tablet by mouth once daily    VENTOLIN HFA 90 mcg/actuation inhaler inhale 1 puff every 4 hours if needed for wheezing OR SHORTNESS OF BREATH    DULoxetine (CYMBALTA) 60 mg capsule take 1 capsule by mouth once daily    chlorthalidone (HYGROTEN) 25 mg tablet Take 0.5 Tabs by mouth daily.  CARTIA  mg ER capsule take 1 capsule by mouth once daily    aspirin delayed-release 81 mg tablet Take 81 mg by mouth daily.  spironolactone (ALDACTONE) 25 mg tablet Take 1 Tab by mouth daily.  Lancets misc Pt request One Touch Delica to test blood sugars three time daily    ONETOUCH ULTRA TEST strip TEST three times a day    Blood-Glucose Meter monitoring kit Pt needs One Touch Ultra glucometer to test blood sugars three times daily E11.9    fentaNYL (DURAGESIC) 50 mcg/hr PATCH 1 Patch by TransDERmal route every seventy-two (72) hours.  fexofenadine (ALLEGRA) 180 mg tablet Take 180 mg by mouth daily.  cyclobenzaprine (FLEXERIL) 10 mg tablet Take  by mouth three (3) times daily as needed for Muscle Spasm(s).  tramadol (ULTRAM) 50 mg tablet Take 50 mg by mouth four (4) times daily.  meloxicam (MOBIC) 7.5 mg tablet Take  by mouth daily.      No current facility-administered medications for this visit. There were no vitals taken for this visit. Data reviewed:  Lab Results   Component Value Date/Time    Hemoglobin A1c 9.1 09/26/2017 03:14 PM    Hemoglobin A1c (POC) 6.8 02/03/2014 11:30 AM     Lab Results   Component Value Date/Time    Cholesterol, total 166 04/14/2017 10:34 AM    HDL Cholesterol 52 04/14/2017 10:34 AM    LDL, calculated 100 04/14/2017 10:34 AM    VLDL, calculated 14 04/14/2017 10:34 AM    Triglyceride 70 04/14/2017 10:34 AM     Lab Results   Component Value Date/Time    ALT (SGPT) 24 04/14/2017 10:34 AM    AST (SGOT) 21 04/14/2017 10:34 AM    Alk. phosphatase 75 04/14/2017 10:34 AM    Bilirubin, total 0.4 04/14/2017 10:34 AM     Lab Results   Component Value Date/Time    Creatinine 0.77 10/19/2017 04:48 PM     Lab Results   Component Value Date/Time    Microalb/Creat ratio (ug/mg creat.) 8.1 04/14/2017 10:34 AM       Diabetes Health Maintenance:  Last eye exam: due now (need to confirm with patient)  Last foot exam: 4/14/17  Last influenza vaccine: 9/9/16  Last Pneumovax 23 vaccine: unknown  Last Prevnar-13 vaccine: NA  Hepatitis B Series: unknown  ASA Therapy: ASA 81mg - not taking because forgets to purchase  ACE/ARB Therapy: Lisinopril 40mg daily  Statin Therapy: Simvastatin 20mg daily    Assessment/Plan:     1. Diabetes:  Patient to have a1c drawn today. He is willing to start [facility] which will help with postprandial sugars. Gave 2 pen samples today and advised patient to take 30 units once daily. Patient to discontinue [place] now since no additional benefit with GLP-1. Patient denied any family history of thyroid cancers or pancreatitis. Encouraged patient to check blood sugars at minimum once daily but ideally twice daily. Will touch base with patient in 1 week to see how sugars are running. [phone] -- text to have patient call    Patient verbalized understanding of the information presented and all of the patients questions were answered. AVS was handed to the patient and information reviewed. Patient advised to call me or Dr. Uli Mckeon MD with any additional questions or concerns. Follow-up: 1 week via phone  Notification of recommendations will be sent to Dr. Uli Mckeon MD for review.     Thank you for the consult,  Mayelin Lopez PharmD, BCPS, CDE    Time spent with the patient:  30  Time spent documenting: 15

## 2017-12-21 NOTE — PROGRESS NOTES
HISTORY OF PRESENT ILLNESS  Bonny Dorman is a 38 y.o. male. HPI   Back pain: Patient states he has not been using Fentanyl patches because they do not stick. He continues Tramadol. Depression: Patient reports he has been feeling down due to loss of family members. He notes he is trying to manage. Carpal tunnel: Patient reports he has severe carpal tunnel in right hand. He is followed by Dr. Yeni Rodriguez at [facility]. He continues Gabapentin and Toradol. Patient was recommended surgery but is unsure if he will pursue because he won't have care after surgery. Rash: He states he follow up with dermatology for skin. He states he was diagnosed with fungus (Tinea versicolor?) for triamcinolone and econazole cream.  Diabetic Review of Systems - medication compliance: compliant all of the time, diabetic diet compliance: compliant most of the time, home glucose monitoring: is performed sporadically, further diabetic ROS: no polyuria or polydipsia, no chest pain, dyspnea or TIA's, no numbness, tingling or pain in extremities. Other symptoms and concerns: Patient report sugars have been between 137-149. He states his sugars did spike up to 190 one morning. Patient continues 44 units of Lantis (generic), Glipizide, and Trajenta. He states he has been strict with medications. Hypertension ROS: taking medications as instructed, no medication side effects noted, no TIA's, no chest pain on exertion, no dyspnea on exertion, no swelling of ankles. New concerns:  Patient's BP in office today is 145/71. Patient continues . Dyslipidemia:  Cardiovascular risk analysis - 38 y.o. male LDL goal is under 100. ROS: taking medications as instructed, no medication side effects noted, no TIA's, no chest pain on exertion, no dyspnea on exertion, no swelling of ankles. Tolerating meds, no myalgias or other side effects noted  New concerns: Patient states he has not been taking medications regularly and has skipped some doses.      Review of Systems   All other systems reviewed and are negative. Physical Exam   Constitutional: He is oriented to person, place, and time. He appears well-developed and well-nourished. HENT:   Head: Normocephalic and atraumatic. Right Ear: External ear normal.   Left Ear: External ear normal.   Nose: Nose normal.   Mouth/Throat: Oropharynx is clear and moist.   Eyes: Conjunctivae and EOM are normal.   Neck: Normal range of motion. Neck supple. Cardiovascular: Normal rate, regular rhythm, normal heart sounds and intact distal pulses. Pulmonary/Chest: Effort normal and breath sounds normal.   Abdominal: Soft. Bowel sounds are normal.   Genitourinary: Testes normal.   Musculoskeletal: Normal range of motion. Neurological: He is alert and oriented to person, place, and time. Skin: Skin is warm and dry. Psychiatric: He has a normal mood and affect. His behavior is normal. Judgment and thought content normal.   Nursing note and vitals reviewed. ASSESSMENT and PLAN  Diagnoses and all orders for this visit:    1. Uncontrolled type 2 diabetes mellitus with complication, with long-term current use of insulin ([facility] [address]  Patient has been compliant and taking medications regularly and trying to watch diet. Will continue current regimen if A1C has not increased too much. Last A1C was 9.1%.  -     HEMOGLOBIN A1C WITH EAG  -     MICROALBUMIN, UR, RAND    2. Essential hypertension  /71 today in office. Continue current medications.   -     METABOLIC PANEL, COMPREHENSIVE    3. Dyslipidemia, goal LDL below 100  Last LDL was 100. Stable, patient tolerating meds, no myalgias. I do not recommend any change in medications.  -     LIPID PANEL    4. Recurrent depression ([address]  Continue to try to manage mood. 5. Chronic bilateral low back pain with bilateral sciatica  Well managed. Continue Tramadol. 6. Rash  Patient will continue to manage with econazole and triamcinolone creams.      7. Bilateral carpal tunnel syndrome  Patient will continue to follow up with Dr. Flynn Garcia. Continue current medications. Patient may follow up for steroid injections or surgery. lab results and schedule of future lab studies reviewed with patient  reviewed diet, exercise and weight control    Written by Faiza Stover, as dictated by Yvan Lopes MD.     Current diagnosis and concerns discussed with pt at length. Understands risks and benefits or current treatment plan and medications and accepts the treatment and medication with any possible risks. Pt asks appropriate questions which were answered. Pt instructed to call with any concerns or problems.

## 2017-12-21 NOTE — Clinical Note
He was willing and excited to start [facility]. He hadn't returned my call tryign to get him on this sooner but was willing to give it a shot. Started at the recommended 30 units and will titrate up. Will keep you posted. He knows to call me next Thursday. Thanks!

## 2017-12-22 LAB
ALBUMIN SERPL-MCNC: 4.3 G/DL (ref 3.5–5.5)
ALBUMIN/GLOB SERPL: 1.4 {RATIO} (ref 1.2–2.2)
ALP SERPL-CCNC: 76 IU/L (ref 39–117)
ALT SERPL-CCNC: 20 IU/L (ref 0–44)
AST SERPL-CCNC: 21 IU/L (ref 0–40)
BILIRUB SERPL-MCNC: 0.4 MG/DL (ref 0–1.2)
BUN SERPL-MCNC: 19 MG/DL (ref 6–24)
BUN/CREAT SERPL: 19 (ref 9–20)
CALCIUM SERPL-MCNC: 9.5 MG/DL (ref 8.7–10.2)
CHLORIDE SERPL-SCNC: 95 MMOL/L (ref 96–106)
CHOLEST SERPL-MCNC: 170 MG/DL (ref 100–199)
CO2 SERPL-SCNC: 26 MMOL/L (ref 18–29)
CREAT SERPL-MCNC: 1.02 MG/DL (ref 0.76–1.27)
EST. AVERAGE GLUCOSE BLD GHB EST-MCNC: 217 MG/DL
GLOBULIN SER CALC-MCNC: 3 G/DL (ref 1.5–4.5)
GLUCOSE SERPL-MCNC: 170 MG/DL (ref 65–99)
HBA1C MFR BLD: 9.2 % (ref 4.8–5.6)
HDLC SERPL-MCNC: 42 MG/DL
INTERPRETATION, 910389: NORMAL
LDLC SERPL CALC-MCNC: 110 MG/DL (ref 0–99)
Lab: NORMAL
MICROALBUMIN UR-MCNC: 29.5 UG/ML
POTASSIUM SERPL-SCNC: 4.4 MMOL/L (ref 3.5–5.2)
PROT SERPL-MCNC: 7.3 G/DL (ref 6–8.5)
SODIUM SERPL-SCNC: 137 MMOL/L (ref 134–144)
TRIGL SERPL-MCNC: 91 MG/DL (ref 0–149)
VLDLC SERPL CALC-MCNC: 18 MG/DL (ref 5–40)

## 2017-12-29 ENCOUNTER — TELEPHONE (OUTPATIENT)
Dept: INTERNAL MEDICINE CLINIC | Age: 42
End: 2017-12-29

## 2018-01-02 NOTE — PROGRESS NOTES
Sent in prescription for Soliqua to [facility]    Orders Placed This Encounter    insulin glargine-lixisenatide (SOLIQUA 100/33) 100 unit-33 mcg/mL inpn     Si Units by SubCUTAneous route daily. Dispense:  5 Pen     Refill:  3       Patient to call if any issues getting it filled.     Segun Guerrier, DominickD, BCPS, CDE

## 2018-01-21 RX ORDER — PEN NEEDLE, DIABETIC 31 GX5/16"
NEEDLE, DISPOSABLE MISCELLANEOUS
Qty: 100 PEN NEEDLE | Status: SHIPPED | OUTPATIENT
Start: 2018-01-21 | End: 2018-04-21 | Stop reason: SDUPTHER

## 2018-01-23 RX ORDER — BLOOD SUGAR DIAGNOSTIC
STRIP MISCELLANEOUS
Qty: 100 STRIP | Refills: 5 | Status: SHIPPED | OUTPATIENT
Start: 2018-01-23 | End: 2019-10-22 | Stop reason: SDUPTHER

## 2018-02-02 RX ORDER — LANCETS 30 GAUGE
EACH MISCELLANEOUS
Qty: 100 LANCET | Refills: 3 | Status: SHIPPED | OUTPATIENT
Start: 2018-02-02 | End: 2020-02-03

## 2018-02-06 ENCOUNTER — HOSPITAL ENCOUNTER (OUTPATIENT)
Dept: GENERAL RADIOLOGY | Age: 43
Discharge: HOME | End: 2018-02-06
Payer: MEDICAID

## 2018-02-06 DIAGNOSIS — M54.50 LOWER BACK PAIN: ICD-10-CM

## 2018-02-06 DIAGNOSIS — M54.2 NECK PAIN: ICD-10-CM

## 2018-02-06 PROCEDURE — 72050 X-RAY EXAM NECK SPINE 4/5VWS: CPT

## 2018-02-12 DIAGNOSIS — I10 ESSENTIAL HYPERTENSION WITH GOAL BLOOD PRESSURE LESS THAN 130/80: ICD-10-CM

## 2018-02-12 RX ORDER — LISINOPRIL 40 MG/1
TABLET ORAL
Qty: 30 TAB | Refills: 3 | Status: SHIPPED | OUTPATIENT
Start: 2018-02-12 | End: 2018-06-14 | Stop reason: SDUPTHER

## 2018-02-12 RX ORDER — HYDROCHLOROTHIAZIDE 25 MG/1
TABLET ORAL
Qty: 30 TAB | Refills: 3 | Status: SHIPPED | OUTPATIENT
Start: 2018-02-12 | End: 2018-06-14 | Stop reason: SDUPTHER

## 2018-02-16 ENCOUNTER — TELEPHONE (OUTPATIENT)
Dept: INTERNAL MEDICINE CLINIC | Age: 43
End: 2018-02-16

## 2018-02-21 ENCOUNTER — DOCUMENTATION ONLY (OUTPATIENT)
Dept: INTERNAL MEDICINE CLINIC | Age: 43
End: 2018-02-21

## 2018-02-22 ENCOUNTER — TELEPHONE (OUTPATIENT)
Dept: INTERNAL MEDICINE CLINIC | Age: 43
End: 2018-02-22

## 2018-02-22 NOTE — TELEPHONE ENCOUNTER
Pt states he was sick a couple weeks ago, fever and cough, felt awful. As been progressively getting better, feels much better but still has cough. Counseled pt on cough remaining ever after improvement for several weeks so sounds as though he is on the mend. If symptoms worsen or fever develops call office for appt. Pt understood and agrees to plan.

## 2018-02-22 NOTE — TELEPHONE ENCOUNTER
Per patient he would like for the nurse to call him back. He was sick but he is better. But his son wants him to come in and be seen but he is not sick any more. Just wants you to call him so his son will leave him alone about this.  No [phone]

## 2018-02-25 RX ORDER — DILTIAZEM HYDROCHLORIDE 240 MG/1
CAPSULE, EXTENDED RELEASE ORAL
Qty: 30 CAP | Refills: 5 | Status: SHIPPED | OUTPATIENT
Start: 2018-02-25 | End: 2018-09-22 | Stop reason: SDUPTHER

## 2018-02-27 DIAGNOSIS — M25.532 LEFT WRIST PAIN: ICD-10-CM

## 2018-02-27 RX ORDER — GABAPENTIN 300 MG/1
CAPSULE ORAL
Qty: 90 CAP | Refills: 1 | Status: SHIPPED | OUTPATIENT
Start: 2018-02-27 | End: 2018-07-19 | Stop reason: SDUPTHER

## 2018-03-18 DIAGNOSIS — I10 ESSENTIAL HYPERTENSION: ICD-10-CM

## 2018-03-18 RX ORDER — ATENOLOL 50 MG/1
TABLET ORAL
Qty: 30 TAB | Refills: 3 | Status: SHIPPED | OUTPATIENT
Start: 2018-03-18 | End: 2018-07-13 | Stop reason: SDUPTHER

## 2018-03-22 ENCOUNTER — OFFICE VISIT (OUTPATIENT)
Dept: INTERNAL MEDICINE CLINIC | Age: 43
End: 2018-03-22

## 2018-03-22 VITALS
SYSTOLIC BLOOD PRESSURE: 132 MMHG | HEIGHT: 72 IN | BODY MASS INDEX: 42.66 KG/M2 | HEART RATE: 78 BPM | RESPIRATION RATE: 14 BRPM | DIASTOLIC BLOOD PRESSURE: 72 MMHG | WEIGHT: 315 LBS | TEMPERATURE: 98.3 F | OXYGEN SATURATION: 98 %

## 2018-03-22 DIAGNOSIS — I10 ESSENTIAL HYPERTENSION: ICD-10-CM

## 2018-03-22 DIAGNOSIS — G89.29 CHRONIC BILATERAL LOW BACK PAIN WITH BILATERAL SCIATICA: ICD-10-CM

## 2018-03-22 DIAGNOSIS — F33.9 RECURRENT DEPRESSION (HCC): ICD-10-CM

## 2018-03-22 DIAGNOSIS — M54.41 CHRONIC BILATERAL LOW BACK PAIN WITH BILATERAL SCIATICA: ICD-10-CM

## 2018-03-22 DIAGNOSIS — M54.42 CHRONIC BILATERAL LOW BACK PAIN WITH BILATERAL SCIATICA: ICD-10-CM

## 2018-03-22 DIAGNOSIS — S13.9XXS NECK SPRAIN, SEQUELA: ICD-10-CM

## 2018-03-22 DIAGNOSIS — E66.01 MORBID OBESITY (HCC): ICD-10-CM
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
## 2018-03-23 ENCOUNTER — TELEPHONE (OUTPATIENT)
Dept: INTERNAL MEDICINE CLINIC | Age: 43
End: 2018-03-23

## 2018-03-23 LAB
CHOLEST SERPL-MCNC: 160 MG/DL (ref 100–199)
EST. AVERAGE GLUCOSE BLD GHB EST-MCNC: 255 MG/DL
HBA1C MFR BLD: 10.5 % (ref 4.8–5.6)
HDLC SERPL-MCNC: 44 MG/DL
INTERPRETATION, 910389: NORMAL
LDLC SERPL CALC-MCNC: 100 MG/DL (ref 0–99)
Lab: NORMAL
TRIGL SERPL-MCNC: 80 MG/DL (ref 0–149)
VLDLC SERPL CALC-MCNC: 16 MG/DL (ref 5–40)

## 2018-03-23 RX ORDER — ROSUVASTATIN CALCIUM 5 MG/1
5 TABLET, COATED ORAL
Qty: 30 TAB | Refills: 5 | Status: SHIPPED | OUTPATIENT
Start: 2018-03-23 | End: 2018-11-16 | Stop reason: SDUPTHER

## 2018-04-16 RX ORDER — OMEPRAZOLE 20 MG/1
CAPSULE, DELAYED RELEASE ORAL
Qty: 30 CAP | Refills: 5 | Status: SHIPPED | OUTPATIENT
Start: 2018-04-16 | End: 2018-11-16 | Stop reason: SDUPTHER

## 2018-04-22 RX ORDER — PEN NEEDLE, DIABETIC 31 GX5/16"
NEEDLE, DISPOSABLE MISCELLANEOUS
Qty: 100 PEN NEEDLE | Status: SHIPPED | OUTPATIENT
Start: 2018-04-22 | End: 2020-11-13 | Stop reason: SDUPTHER

## 2018-04-26 ENCOUNTER — TELEPHONE (OUTPATIENT)
Dept: INTERNAL MEDICINE CLINIC | Age: 43
End: 2018-04-26

## 2018-04-26 NOTE — TELEPHONE ENCOUNTER
S/O:  Called patient to follow up on blood sugars. Have been talking to patient intermittently about lowing his blood sugar and adherence with medication. Patient has been checking sugars daily and taking 30 units of soliqua every night. Fasting blood sugars in the 190's. Patients food log is well balanced with carbs/vegetables and water with crystal light packets. A/P:  Advised patient we likely didn't have the [address] at the correct dose yet and encouraged him to continue his current lifestyle changes and increase Soliqua to 32 units. Patient has a history of not eating when sugars are higher and then skipping insulin which resulted in higher blood sugars. Will follow up on Monday/Tuesday after at least 3 doses of Soliqua 32. James Conley, PharmD, BCPS, CDE      Patient verbalized understanding. Recommendations sent to physician and all changes made per CPA.

## 2018-05-22 RX ORDER — SPIRONOLACTONE 25 MG/1
TABLET ORAL
Qty: 30 TAB | Refills: 12 | Status: SHIPPED | OUTPATIENT
Start: 2018-05-22 | End: 2019-07-09 | Stop reason: SDUPTHER

## 2018-05-22 RX ORDER — MINERAL OIL
ENEMA (ML) RECTAL
Qty: 30 TAB | Refills: 4 | Status: SHIPPED | OUTPATIENT
Start: 2018-05-22 | End: 2018-12-14 | Stop reason: SDUPTHER

## 2018-05-23 ENCOUNTER — TELEPHONE (OUTPATIENT)
Dept: INTERNAL MEDICINE CLINIC | Age: 43
End: 2018-05-23

## 2018-05-24 NOTE — TELEPHONE ENCOUNTER
Contacted pt and advised of appt in office. Pt's care is in shop and he would have to find a ride. Offered appt later today to provide time to find a ride, pt declined. Offered appt tomorrow but pt does not want to see another provided. He will return call if he chooses to make appt.

## 2018-05-25 ENCOUNTER — TELEPHONE (OUTPATIENT)
Dept: INTERNAL MEDICINE CLINIC | Age: 43
End: 2018-05-25

## 2018-05-30 RX ORDER — GLIPIZIDE 5 MG/1
TABLET, FILM COATED, EXTENDED RELEASE ORAL
Qty: 30 TAB | Refills: 5 | Status: SHIPPED | OUTPATIENT
Start: 2018-05-30 | End: 2019-01-10 | Stop reason: SDUPTHER

## 2018-06-08 ENCOUNTER — TELEPHONE (OUTPATIENT)
Dept: INTERNAL MEDICINE CLINIC | Age: 43
End: 2018-06-08

## 2018-06-08 NOTE — TELEPHONE ENCOUNTER
Patient calling to review blood sugars. States that they were doing pretty good and then he started drinking a coffee type beverage and he has noticed they have increased. Upon further investigation, the coffee drink has about 15 grams of carbs and he was drinking sometimes 2 per day. Still doing the Soliqua 34 units once daily. Fastings have been in the 160's and 170's but when drinking the coffee are in the 190's. Advised him to increase Soliqua to 36 units per CPA and will follow up with him next week.      Burgess Manzano, PharmD, BCPS, CDE

## 2018-06-13 ENCOUNTER — OFFICE VISIT (OUTPATIENT)
Dept: INTERNAL MEDICINE CLINIC | Age: 43
End: 2018-06-13

## 2018-06-13 ENCOUNTER — HOSPITAL ENCOUNTER (OUTPATIENT)
Dept: GENERAL RADIOLOGY | Age: 43
Discharge: HOME | End: 2018-06-13
Attending: INTERNAL MEDICINE
Payer: COMMERCIAL

## 2018-06-13 VITALS
WEIGHT: 315 LBS | HEART RATE: 93 BPM | BODY MASS INDEX: 42.66 KG/M2 | TEMPERATURE: 99.4 F | HEIGHT: 72 IN | RESPIRATION RATE: 16 BRPM | OXYGEN SATURATION: 98 % | SYSTOLIC BLOOD PRESSURE: 120 MMHG | DIASTOLIC BLOOD PRESSURE: 82 MMHG

## 2018-06-13 DIAGNOSIS — E78.5 DYSLIPIDEMIA, GOAL LDL BELOW 100: ICD-10-CM

## 2018-06-13 DIAGNOSIS — J20.8 ACUTE BRONCHITIS DUE TO OTHER SPECIFIED ORGANISMS: Primary | ICD-10-CM

## 2018-06-13 DIAGNOSIS — I10 ESSENTIAL HYPERTENSION: ICD-10-CM

## 2018-06-13 DIAGNOSIS — J20.8 ACUTE BRONCHITIS DUE TO OTHER SPECIFIED ORGANISMS: ICD-10-CM

## 2018-06-13 DIAGNOSIS — G47.33 OSA (OBSTRUCTIVE SLEEP APNEA): ICD-10-CM

## 2018-06-13 PROCEDURE — 71046 X-RAY EXAM CHEST 2 VIEWS: CPT

## 2018-06-13 RX ORDER — DOXYCYCLINE 100 MG/1
100 TABLET ORAL 2 TIMES DAILY
Qty: 20 TAB | Refills: 0 | Status: SHIPPED | OUTPATIENT
Start: 2018-06-13 | End: 2019-07-18 | Stop reason: ALTCHOICE

## 2018-06-13 NOTE — PROGRESS NOTES
HISTORY OF PRESENT ILLNESS  Anna Reyes is a 41 y.o. male. HPI   Patient reports onset of cough, congestion around 4/29/18. He was having intermittent fever and chills. Patient states the cough was violent and induced vomiting intermittently. Patient had used his mother's nebulizer and the following morning he was able to mobilize mucus and expel with coughing. He did not use nebulizer again. His highest temp was 103 F. Admits to horrible night sweats. Patient had gotten a little better, but his mother returned from travelling sick, and he became sick again. Admits to hearing wheezes and thick sputum. He was treating with cough syrup. Diabetic Review of Systems - medication compliance: compliant all of the time, home glucose monitoring: is performed regularly, further diabetic ROS: no polyuria or polydipsia, no chest pain, dyspnea or TIA's, no numbness, tingling or pain in extremities. Other symptoms and concerns: Patient stopped drinking coffee [employer]. He notes sugars were running 160-170 when drinking coffee in the morning. He has stopped drinking coffee. Patient continues [place]. He was instructed to increase Soliqua to 36 units and his sugars were running a little better. He is doing well with insulin and tolerating meds. He continues to follow up with [facility] for diabetic education and advice. Patient notes sugars were elevated when he takes cough syrup. Patient notes he has been trying to exercise with friends, but he has not been feeling well enough to exercise. Patient took Soliqua BID when sugars were running high because of cough syrup and sugars responded well. Hypertension ROS: taking medications as instructed, no medication side effects noted, no TIA's, no chest pain on exertion, no dyspnea on exertion, no swelling of ankles. New concerns:  Patient's BP in office today is 120/82. Hyperlipidemia:  Cardiovascular risk analysis - 41 y.o. male LDL goal is under 100.    ROS: taking medications as instructed, no medication side effects noted, no TIA's, no chest pain on exertion, no dyspnea on exertion, no swelling of ankles. Tolerating meds, no myalgias or other side effects noted  New concerns: Last LDL was 100 on 3/22/18. JENNIFER: Patient is not on CPAP machine at this time. Review of Systems   All other systems reviewed and are negative. Physical Exam   Constitutional: He is oriented to person, place, and time. He appears well-developed and well-nourished. HENT:   Head: Normocephalic and atraumatic. Right Ear: External ear normal.   Left Ear: External ear normal.   Nose: Nose normal.   Mouth/Throat: Oropharynx is clear and moist.   Eyes: Conjunctivae and EOM are normal.   Neck: Normal range of motion. Neck supple. Cardiovascular: Normal rate, regular rhythm, normal heart sounds and intact distal pulses. Pulmonary/Chest: Effort normal and breath sounds normal.   Abdominal: Soft. Bowel sounds are normal.   Genitourinary: Testes normal.   Musculoskeletal: Normal range of motion. Neurological: He is alert and oriented to person, place, and time. Skin: Skin is warm and dry. Psychiatric: He has a normal mood and affect. His behavior is normal. Judgment and thought content normal.   Nursing note and vitals reviewed. ASSESSMENT and PLAN  Diagnoses and all orders for this visit:    1. Acute bronchitis due to other specified organisms  Presents with recurrent bronchitis, chest tightness, wheezes, congestion, cough. Will treat with doxycycline. Ordered chest XR to rule other infectious etiologies. -     doxycycline (ADOXA) 100 mg tablet; Take 1 Tab by mouth two (2) times a day. -     XR CHEST PA LAT; Future    2. Uncontrolled type 2 diabetes mellitus with complication, with long-term current use of insulin ([facility] [address]  Patient will continue to follow up with Blanca to discuss insulin regimen.   We will recheck A1c in 1-2 months, patient has not been feeling well and taking cough syrup, sugars most likely elevated at this time. He will continue to follow up with [facility]won and resume exercising once he has recovered from bronchitis. 3. Essential hypertension  BP is at goal. I do not recommend any change in medications. 4. Dyslipidemia, goal LDL below 100  Stable, no myalgias. I do not recommend any change in medications. 5. JENNIFER (obstructive sleep apnea)  Patient not on CPAP at this time, stable and doing well.     lab results and schedule of future lab studies reviewed with patient  reviewed diet, exercise and weight control    Written by Aguila Perez, as dictated by Corina Tinoco MD.     Current diagnosis and concerns discussed with pt at length. Understands risks and benefits or current treatment plan and medications and accepts the treatment and medication with any possible risks. Pt asks appropriate questions which were answered. Pt instructed to call with any concerns or problems.

## 2018-06-14 DIAGNOSIS — I10 ESSENTIAL HYPERTENSION WITH GOAL BLOOD PRESSURE LESS THAN 130/80: ICD-10-CM

## 2018-06-14 RX ORDER — HYDROCHLOROTHIAZIDE 25 MG/1
TABLET ORAL
Qty: 30 TAB | Refills: 3 | Status: SHIPPED | OUTPATIENT
Start: 2018-06-14 | End: 2018-11-16 | Stop reason: SDUPTHER

## 2018-06-14 RX ORDER — LISINOPRIL 40 MG/1
TABLET ORAL
Qty: 30 TAB | Refills: 3 | Status: SHIPPED | OUTPATIENT
Start: 2018-06-14 | End: 2018-11-16 | Stop reason: SDUPTHER

## 2018-06-18 ENCOUNTER — TELEPHONE (OUTPATIENT)
Dept: INTERNAL MEDICINE CLINIC | Age: 43
End: 2018-06-18

## 2018-06-18 RX ORDER — LEVOFLOXACIN 500 MG/1
500 TABLET, FILM COATED ORAL DAILY
Qty: 7 TAB | Refills: 0 | Status: SHIPPED | OUTPATIENT
Start: 2018-06-18 | End: 2018-06-25

## 2018-06-18 NOTE — TELEPHONE ENCOUNTER
Contacted pt and advised of switch of antibiotic and to follow up in office if not getting better. Pt understood and agrees to plan.

## 2018-06-18 NOTE — TELEPHONE ENCOUNTER
Pt call in says has about 9 or 10 pills left on the antibiotic and he is not any better if not feeling worse. Still having fever, burning in chest. He is no coughing up mucus and blowing nose more now than was before. Please call to discuss what he should do if anything.  Please call [phone] (H)

## 2018-07-13 DIAGNOSIS — I10 ESSENTIAL HYPERTENSION: ICD-10-CM

## 2018-07-13 RX ORDER — ATENOLOL 50 MG/1
TABLET ORAL
Qty: 30 TAB | Refills: 3 | Status: SHIPPED | OUTPATIENT
Start: 2018-07-13 | End: 2018-12-14 | Stop reason: SDUPTHER

## 2018-07-19 DIAGNOSIS — M25.532 LEFT WRIST PAIN: ICD-10-CM

## 2018-07-19 RX ORDER — GABAPENTIN 300 MG/1
CAPSULE ORAL
Qty: 90 CAP | Refills: 1 | Status: SHIPPED | OUTPATIENT
Start: 2018-07-19 | End: 2019-02-13 | Stop reason: SDUPTHER

## 2018-07-25 RX ORDER — TRIAMCINOLONE ACETONIDE 1 MG/G
OINTMENT TOPICAL
Qty: 60 G | Refills: 1 | Status: SHIPPED | OUTPATIENT
Start: 2018-07-25 | End: 2019-07-18

## 2018-08-27 ENCOUNTER — TELEPHONE (OUTPATIENT)
Dept: INTERNAL MEDICINE CLINIC | Age: 43
End: 2018-08-27

## 2018-08-27 NOTE — TELEPHONE ENCOUNTER
Patient is requesting to speak with the nurse to get a medication to help with his anxiety and his chest tightness. States he had this happen once before recently and was given Ativan. Explained an apt may be required.  Patient can be reached at [phone]

## 2018-08-27 NOTE — TELEPHONE ENCOUNTER
Patient expresses recent stress with family & due to his anxiety his chest becomes tight. He notes this has happened before & he went to the hospital & his cardiac work up was normal. He was dx with a panic attack & given Ativan. Patient is working with his insurance to find a psychiatrist but needs some medication to help in the interim. Appt scheduled for tomorrow to discuss further with PCP.

## 2018-08-28 ENCOUNTER — OFFICE VISIT (OUTPATIENT)
Dept: INTERNAL MEDICINE CLINIC | Age: 43
End: 2018-08-28

## 2018-08-28 VITALS
RESPIRATION RATE: 16 BRPM | DIASTOLIC BLOOD PRESSURE: 80 MMHG | OXYGEN SATURATION: 98 % | WEIGHT: 315 LBS | SYSTOLIC BLOOD PRESSURE: 141 MMHG | TEMPERATURE: 98.3 F | BODY MASS INDEX: 44.1 KG/M2 | HEIGHT: 71 IN | HEART RATE: 78 BPM

## 2018-08-28 DIAGNOSIS — M54.42 CHRONIC BILATERAL LOW BACK PAIN WITH BILATERAL SCIATICA: ICD-10-CM

## 2018-08-28 DIAGNOSIS — F41.9 ANXIETY: Primary | ICD-10-CM

## 2018-08-28 DIAGNOSIS — G89.29 CHRONIC BILATERAL LOW BACK PAIN WITH BILATERAL SCIATICA: ICD-10-CM

## 2018-08-28 DIAGNOSIS — I10 ESSENTIAL HYPERTENSION: ICD-10-CM

## 2018-08-28 DIAGNOSIS — E78.5 DYSLIPIDEMIA, GOAL LDL BELOW 100: ICD-10-CM

## 2018-08-28 DIAGNOSIS — M54.41 CHRONIC BILATERAL LOW BACK PAIN WITH BILATERAL SCIATICA: ICD-10-CM

## 2018-08-28 RX ORDER — ALPRAZOLAM 0.5 MG/1
0.5 TABLET ORAL
Qty: 10 TAB | Refills: 0 | Status: SHIPPED | OUTPATIENT
Start: 2018-08-28 | End: 2019-02-13 | Stop reason: SDUPTHER

## 2018-08-28 NOTE — PROGRESS NOTES
1. Have you been to the ER, urgent care clinic since your last visit? Hospitalized since your last visit? No    2. Have you seen or consulted any other health care providers outside of the [facility] since your last visit? Include any pap smears or colon screening. No     Chief Complaint   Patient presents with    Anxiety     42 y/o male reports to office to discuss Anxiety.

## 2018-08-28 NOTE — PROGRESS NOTES
HISTORY OF PRESENT ILLNESS  Alexsander Mathias is a 41 y.o. male. HPI  Mood: Pt has been experiencing anxiety and chest tightness due to brother's health (cyst), difficulties with [facility], and other personal circumstances. He frequently wakes up in middle of night feeling scared and nervous about personal safety. He inquires about Ativan. Pt notes that Cymbalta is ineffective. He followed up with counselor in past, who was unhelpful. He plans to follow up with psychiatrist.     Diabetic Review of Systems - further diabetic ROS: no polyuria or polydipsia, no chest pain, dyspnea or TIA's, no numbness, tingling or pain in extremities. Other symptoms and concerns: Pt continues on Insulin and Glipizide. He notes that sugar level at home was 90 yesterday. Hypertension ROS: taking medications as instructed, no medication side effects noted, no TIA's, no chest pain on exertion, no dyspnea on exertion, no swelling of ankles. New concerns:  Patient's BP in office today is 141/80. He continues on Lisinopril. Dyslipidemia:  Cardiovascular risk analysis - 41 y.o. male LDL goal is under 100. ROS: taking medications as instructed, no medication side effects noted, no TIA's, no chest pain on exertion, no dyspnea on exertion, no swelling of ankles. Tolerating meds, no myalgias or other side effects noted  New concerns: His last LDL was 100 on 3/22/18. Pt continues on Crestor. Low Back Pain: Stable condition. Pt continues on current meds. Review of Systems   All other systems reviewed and are negative. Physical Exam   Constitutional: He is oriented to person, place, and time. He appears well-developed and well-nourished. HENT:   Head: Normocephalic and atraumatic. Right Ear: External ear normal.   Left Ear: External ear normal.   Nose: Nose normal.   Mouth/Throat: Oropharynx is clear and moist.   Eyes: Conjunctivae and EOM are normal.   Neck: Normal range of motion. Neck supple.    Cardiovascular: Normal rate, regular rhythm, normal heart sounds and intact distal pulses. Pulmonary/Chest: Effort normal and breath sounds normal.   Abdominal: Soft. Bowel sounds are normal.   Genitourinary: Testes normal.   Musculoskeletal: Normal range of motion. Neurological: He is alert and oriented to person, place, and time. Skin: Skin is warm and dry. Psychiatric: He has a normal mood and affect. His behavior is normal. Judgment and thought content normal.   Nursing note and vitals reviewed. ASSESSMENT and PLAN  Diagnoses and all orders for this visit:    1. Anxiety  Prescribed Xanax. Discussed that Xanax should not be taken everyday. Pt will follow up with psychiatrist. Encouraged pt to follow up with different therapist at [facility]. -     ALPRAZolam (XANAX) 0.5 mg tablet; Take 1 Tab by mouth nightly as needed for Anxiety. Max Daily Amount: 0.5 mg.    2. Uncontrolled type 2 diabetes mellitus with complication, with long-term current use of insulin (HCC)  Sugars stable. Continue to follow diabetic diet and monitor sugars. 3. Essential hypertension  BP elevated. I do not recommend any change in medications. 4. Dyslipidemia, goal LDL below 100  Stable, patient tolerating meds, no myalgias. I do not recommend any change in medications. 5. Chronic bilateral low back pain with bilateral sciatica  Stable, and well-managed. Continue current medications. Lab results and schedule of future lab studies reviewed with patient. Reviewed diet, exercise and weight control. Written by Rosa Erickson, as dictated by Natanael Taylor MD.     Current diagnosis and concerns discussed with pt at length. Understands risks and benefits or current treatment plan and medications and accepts the treatment and medication with any possible risks. Pt asks appropriate questions which were answered. Pt instructed to call with any concerns or problems.

## 2018-09-18 RX ORDER — INSULIN GLARGINE AND LIXISENATIDE 100; 33 U/ML; UG/ML
INJECTION, SOLUTION SUBCUTANEOUS
Qty: 9 ML | Refills: 5 | Status: SHIPPED | OUTPATIENT
Start: 2018-09-18 | End: 2019-01-08 | Stop reason: SDUPTHER

## 2018-09-22 RX ORDER — DILTIAZEM HYDROCHLORIDE 240 MG/1
CAPSULE, COATED, EXTENDED RELEASE ORAL
Qty: 30 CAP | Refills: 5 | Status: SHIPPED | OUTPATIENT
Start: 2018-09-22 | End: 2019-04-10 | Stop reason: SDUPTHER

## 2018-11-07 ENCOUNTER — TELEPHONE (OUTPATIENT)
Dept: INTERNAL MEDICINE CLINIC | Age: 43
End: 2018-11-07

## 2018-11-16 DIAGNOSIS — I10 ESSENTIAL HYPERTENSION WITH GOAL BLOOD PRESSURE LESS THAN 130/80: ICD-10-CM

## 2018-11-16 RX ORDER — ROSUVASTATIN CALCIUM 5 MG/1
TABLET, COATED ORAL
Qty: 30 TAB | Refills: 5 | Status: SHIPPED | OUTPATIENT
Start: 2018-11-16 | End: 2019-07-09 | Stop reason: SDUPTHER

## 2018-11-16 RX ORDER — OMEPRAZOLE 20 MG/1
CAPSULE, DELAYED RELEASE ORAL
Qty: 30 CAP | Refills: 5 | Status: SHIPPED | OUTPATIENT
Start: 2018-11-16 | End: 2019-06-29 | Stop reason: SDUPTHER

## 2018-11-16 RX ORDER — LISINOPRIL 40 MG/1
TABLET ORAL
Qty: 30 TAB | Refills: 3 | Status: SHIPPED | OUTPATIENT
Start: 2018-11-16 | End: 2019-03-15 | Stop reason: SDUPTHER

## 2018-11-16 RX ORDER — HYDROCHLOROTHIAZIDE 25 MG/1
TABLET ORAL
Qty: 30 TAB | Refills: 3 | Status: SHIPPED | OUTPATIENT
Start: 2018-11-16 | End: 2019-03-15 | Stop reason: SDUPTHER

## 2018-12-14 DIAGNOSIS — I10 ESSENTIAL HYPERTENSION: ICD-10-CM

## 2018-12-14 RX ORDER — ATENOLOL 50 MG/1
TABLET ORAL
Qty: 30 TAB | Refills: 3 | Status: SHIPPED | OUTPATIENT
Start: 2018-12-14 | End: 2019-06-28 | Stop reason: SDUPTHER

## 2018-12-14 RX ORDER — MINERAL OIL
ENEMA (ML) RECTAL
Qty: 30 TAB | Refills: 4 | Status: SHIPPED | OUTPATIENT
Start: 2018-12-14 | End: 2019-06-29 | Stop reason: SDUPTHER

## 2019-01-03 NOTE — PROGRESS NOTES
Visit Vitals    /90 (BP 1 Location: Left arm, BP Patient Position: Sitting)    Pulse 68    Resp 16    Ht 5' 11.5\" (1.816 m)    Wt (!) 370 lb (167.8 kg)    SpO2 98%    BMI 50.89 kg/m2 normal

## 2019-01-08 RX ORDER — INSULIN GLARGINE AND LIXISENATIDE 100; 33 U/ML; UG/ML
INJECTION, SOLUTION SUBCUTANEOUS
Qty: 12 ML | Refills: 5 | Status: SHIPPED | OUTPATIENT
Start: 2019-01-08 | End: 2019-02-01 | Stop reason: SDUPTHER

## 2019-01-10 RX ORDER — GLIPIZIDE 5 MG/1
TABLET, FILM COATED, EXTENDED RELEASE ORAL
Qty: 30 TAB | Refills: 5 | Status: SHIPPED | OUTPATIENT
Start: 2019-01-10 | End: 2019-07-18

## 2019-02-01 ENCOUNTER — TELEPHONE (OUTPATIENT)
Dept: INTERNAL MEDICINE CLINIC | Age: 44
End: 2019-02-01

## 2019-02-01 NOTE — TELEPHONE ENCOUNTER
Patient called stating that he needed the directions updated on his prescription. He is currently taking Soliqua 52 units per day and has good control with his blood sugars. Usually takes 26 units twice daily. Will send in update prescription and follow up with patient regarding blood sugars within the next month.     John Barker, DominickD, BCPS, CDE

## 2019-02-12 RX ORDER — PEN NEEDLE, DIABETIC 32GX 5/32"
NEEDLE, DISPOSABLE MISCELLANEOUS
Qty: 100 PEN NEEDLE | Status: SHIPPED | OUTPATIENT
Start: 2019-02-12 | End: 2020-08-10

## 2019-02-13 DIAGNOSIS — F41.9 ANXIETY: ICD-10-CM

## 2019-02-13 DIAGNOSIS — M25.532 LEFT WRIST PAIN: ICD-10-CM

## 2019-02-13 RX ORDER — GABAPENTIN 300 MG/1
CAPSULE ORAL
Qty: 90 CAP | Refills: 1 | Status: SHIPPED | OUTPATIENT
Start: 2019-02-13 | End: 2019-06-29 | Stop reason: SDUPTHER

## 2019-02-13 RX ORDER — ALPRAZOLAM 0.5 MG/1
TABLET ORAL
Qty: 10 TAB | Refills: 0 | OUTPATIENT
Start: 2019-02-13 | End: 2019-07-09 | Stop reason: SDUPTHER

## 2019-03-15 DIAGNOSIS — I10 ESSENTIAL HYPERTENSION WITH GOAL BLOOD PRESSURE LESS THAN 130/80: ICD-10-CM

## 2019-03-15 RX ORDER — HYDROCHLOROTHIAZIDE 25 MG/1
TABLET ORAL
Qty: 30 TAB | Refills: 3 | Status: SHIPPED | OUTPATIENT
Start: 2019-03-15 | End: 2019-06-29 | Stop reason: SDUPTHER

## 2019-03-15 RX ORDER — LISINOPRIL 40 MG/1
TABLET ORAL
Qty: 30 TAB | Refills: 3 | Status: SHIPPED | OUTPATIENT
Start: 2019-03-15 | End: 2019-08-23 | Stop reason: SDUPTHER

## 2019-04-10 RX ORDER — DILTIAZEM HYDROCHLORIDE 240 MG/1
CAPSULE, COATED, EXTENDED RELEASE ORAL
Qty: 30 CAP | Refills: 5 | Status: SHIPPED | OUTPATIENT
Start: 2019-04-10 | End: 2019-11-19 | Stop reason: SDUPTHER

## 2019-04-23 DIAGNOSIS — M25.532 LEFT WRIST PAIN: ICD-10-CM

## 2019-04-24 RX ORDER — KETOROLAC TROMETHAMINE 10 MG/1
TABLET, FILM COATED ORAL
Qty: 60 TAB | Refills: 3 | Status: SHIPPED | OUTPATIENT
Start: 2019-04-24 | End: 2020-03-02

## 2019-05-17 ENCOUNTER — TELEPHONE (OUTPATIENT)
Dept: INTERNAL MEDICINE CLINIC | Age: 44
End: 2019-05-17

## 2019-06-28 DIAGNOSIS — I10 ESSENTIAL HYPERTENSION: ICD-10-CM

## 2019-06-28 RX ORDER — ATENOLOL 50 MG/1
TABLET ORAL
Qty: 30 TAB | Refills: 0 | Status: SHIPPED | OUTPATIENT
Start: 2019-06-28 | End: 2019-07-24 | Stop reason: SDUPTHER

## 2019-06-29 DIAGNOSIS — M25.532 LEFT WRIST PAIN: ICD-10-CM

## 2019-06-29 DIAGNOSIS — I10 ESSENTIAL HYPERTENSION WITH GOAL BLOOD PRESSURE LESS THAN 130/80: ICD-10-CM

## 2019-06-29 RX ORDER — GABAPENTIN 300 MG/1
CAPSULE ORAL
Qty: 90 CAP | Refills: 0 | Status: SHIPPED | OUTPATIENT
Start: 2019-06-29 | End: 2019-08-23 | Stop reason: SDUPTHER

## 2019-06-29 RX ORDER — OMEPRAZOLE 20 MG/1
CAPSULE, DELAYED RELEASE ORAL
Qty: 30 CAP | Refills: 0 | Status: SHIPPED | OUTPATIENT
Start: 2019-06-29 | End: 2019-07-24 | Stop reason: SDUPTHER

## 2019-06-29 RX ORDER — HYDROCHLOROTHIAZIDE 25 MG/1
TABLET ORAL
Qty: 30 TAB | Refills: 0 | Status: SHIPPED | OUTPATIENT
Start: 2019-06-29 | End: 2019-08-23 | Stop reason: SDUPTHER

## 2019-06-29 RX ORDER — GABAPENTIN 300 MG/1
CAPSULE ORAL
Qty: 90 CAP | Refills: 0 | Status: SHIPPED | OUTPATIENT
Start: 2019-06-29 | End: 2019-07-24 | Stop reason: SDUPTHER

## 2019-06-29 RX ORDER — MINERAL OIL
ENEMA (ML) RECTAL
Qty: 30 TAB | Refills: 0 | Status: SHIPPED | OUTPATIENT
Start: 2019-06-29 | End: 2019-08-14 | Stop reason: SDUPTHER

## 2019-07-09 DIAGNOSIS — F41.9 ANXIETY: ICD-10-CM

## 2019-07-09 RX ORDER — ROSUVASTATIN CALCIUM 5 MG/1
TABLET, COATED ORAL
Qty: 30 TAB | Refills: 0 | Status: SHIPPED | OUTPATIENT
Start: 2019-07-09 | End: 2019-08-11 | Stop reason: SDUPTHER

## 2019-07-10 RX ORDER — SPIRONOLACTONE 25 MG/1
TABLET ORAL
Qty: 30 TAB | Refills: 0 | Status: SHIPPED | OUTPATIENT
Start: 2019-07-10 | End: 2019-08-11 | Stop reason: SDUPTHER

## 2019-07-10 RX ORDER — ALPRAZOLAM 0.5 MG/1
TABLET ORAL
Qty: 10 TAB | Refills: 0 | OUTPATIENT
Start: 2019-07-10 | End: 2020-02-28

## 2019-07-10 RX ORDER — ALBUTEROL SULFATE 90 UG/1
AEROSOL, METERED RESPIRATORY (INHALATION)
Qty: 18 G | Refills: 0 | Status: SHIPPED | OUTPATIENT
Start: 2019-07-10 | End: 2019-08-11 | Stop reason: SDUPTHER

## 2019-07-18 ENCOUNTER — OFFICE VISIT (OUTPATIENT)
Dept: INTERNAL MEDICINE CLINIC | Age: 44
End: 2019-07-18

## 2019-07-18 VITALS
BODY MASS INDEX: 44.1 KG/M2 | RESPIRATION RATE: 18 BRPM | TEMPERATURE: 99.2 F | DIASTOLIC BLOOD PRESSURE: 76 MMHG | WEIGHT: 315 LBS | HEIGHT: 71 IN | OXYGEN SATURATION: 95 % | SYSTOLIC BLOOD PRESSURE: 140 MMHG | HEART RATE: 88 BPM

## 2019-07-18 DIAGNOSIS — E78.5 DYSLIPIDEMIA, GOAL LDL BELOW 100: ICD-10-CM

## 2019-07-18 DIAGNOSIS — M54.41 CHRONIC BILATERAL LOW BACK PAIN WITH BILATERAL SCIATICA: ICD-10-CM

## 2019-07-18 DIAGNOSIS — I10 ESSENTIAL HYPERTENSION WITH GOAL BLOOD PRESSURE LESS THAN 130/80: ICD-10-CM

## 2019-07-18 DIAGNOSIS — G89.29 CHRONIC BILATERAL LOW BACK PAIN WITH BILATERAL SCIATICA: ICD-10-CM

## 2019-07-18 DIAGNOSIS — M54.42 CHRONIC BILATERAL LOW BACK PAIN WITH BILATERAL SCIATICA: ICD-10-CM

## 2019-07-18 DIAGNOSIS — F41.9 ANXIETY: ICD-10-CM

## 2019-07-18 DIAGNOSIS — E66.01 MORBID OBESITY (HCC): ICD-10-CM

## 2019-07-18 RX ORDER — GLIPIZIDE 5 MG/1
10 TABLET, FILM COATED, EXTENDED RELEASE ORAL DAILY
Qty: 30 TAB | Refills: 5 | Status: SHIPPED | OUTPATIENT
Start: 2019-07-18 | End: 2019-11-23 | Stop reason: SDUPTHER

## 2019-07-18 RX ORDER — CLOBETASOL PROPIONATE 0.5 MG/G
1 OINTMENT TOPICAL DAILY
Refills: 0 | COMMUNITY
Start: 2019-07-10

## 2019-07-18 RX ORDER — INSULIN LISPRO 100 [IU]/ML
12 INJECTION, SOLUTION INTRAVENOUS; SUBCUTANEOUS
Qty: 1 PACKAGE | Refills: 4 | Status: SHIPPED | OUTPATIENT
Start: 2019-07-18 | End: 2020-01-14

## 2019-07-18 NOTE — PROGRESS NOTES
HISTORY OF PRESENT ILLNESS  Luke Casas is a 45 y.o. male. HPI  Mood: Pt notes that he has been more busy with family and stress recently. He notes improvement in his mood. He was prescribed a medication to help with his anxiety since he get anxious in larger crowds. He is not sure why, but the rx never came through. Lower back pain: Pt continues on Tramadol, Flexeril, Cymbalta, Toradol and  Gabapentin. Diabetic Review of Systems - medication compliance: compliant all of the time, diabetic diet compliance: compliant most of the time, home glucose monitoring: is performed regularly, further diabetic ROS: no polyuria or polydipsia, no chest pain, dyspnea or TIA's, no numbness, tingling or pain in extremities. Other symptoms and concerns: Pt reports he is still not eating in the mornings. He notes his sugars were about 385. Pt notes he was taking 42 units of Soliqua and his sugars dropped to 342. He notes that yesterday he took 50 units and his sugars were 317 and he took another Holmat[place] 45 and his sugars went to 185. He notes he has been doing his [place] 1x/day on avg. He notes every time he eats his sugars spike to over 300. He notes that when he works out he has had some issues with his sugars dropping low and to the 60's. Hypertension ROS: taking medications as instructed, no medication side effects noted, no TIA's, no chest pain on exertion, no dyspnea on exertion, no swelling of ankles. New concerns: BP in office today is 140/76. Hyperlipidemia:  Cardiovascular risk analysis - 45 y.o. male LDL goal is under 100. ROS: taking medications as instructed, no medication side effects noted, no TIA's, no chest pain on exertion, no dyspnea on exertion, no swelling of ankles. Tolerating meds, no myalgias or other side effects noted  New concerns: Pt's last LDL was 100 on 3/23/18. Review of Systems   Musculoskeletal: Positive for back pain.    All other systems reviewed and are negative. Physical Exam   Constitutional: He is oriented to person, place, and time. He appears well-developed and well-nourished. HENT:   Head: Normocephalic and atraumatic. Right Ear: External ear normal.   Left Ear: External ear normal.   Nose: Nose normal.   Mouth/Throat: Oropharynx is clear and moist.   Eyes: Pupils are equal, round, and reactive to light. Conjunctivae and EOM are normal.   Neck: Normal range of motion. Neck supple. Cardiovascular: Normal rate, regular rhythm, normal heart sounds and intact distal pulses. Pulmonary/Chest: Effort normal and breath sounds normal.   Abdominal: Soft. Bowel sounds are normal.   Genitourinary: Rectum normal, prostate normal, testes normal and penis normal. Rectal exam shows anal tone normal.   Musculoskeletal: Normal range of motion. Neurological: He is alert and oriented to person, place, and time. Skin: Skin is warm and dry. Psychiatric: He has a normal mood and affect. His behavior is normal. Judgment and thought content normal.   Nursing note and vitals reviewed. ASSESSMENT and PLAN  Diagnoses and all orders for this visit:    1. Type II diabetes mellitus with complication, uncontrolled (HCC)  Prescribed Humalog. Discussed with pt that Rachel Diamondz is a long acting insulin and it seems as though he needs short actin insulin. Advised pt to f/u with Danilo Shaw in 1 month. Will continue to monitor for improvements or changes. -     insulin glargine-lixisenatide (SOLIQUA 100/33) 100 unit-33 mcg/mL inpn; 50 Units by SubCUTAneous route daily. -     insulin lispro (HUMALOG) 100 unit/mL kwikpen; 12 Units by SubCUTAneous route Before breakfast, lunch, and dinner. Please give one month supply  -     MICROALBUMIN, UR, RAND W/ MICROALB/CREAT RATIO  -     HEMOGLOBIN A1C WITH EAG  -     glipiZIDE SR (GLUCOTROL XL) 5 mg CR tablet; Take 2 Tabs by mouth daily.  -     MICROALBUMIN, UR, RAND W/ MICROALB/CREAT RATIO  -     HEMOGLOBIN A1C WITH EAG    2.  Essential hypertension with goal blood pressure less than 130/80  BP is at goal. I do not recommend any change in medications.   -     METABOLIC PANEL, COMPREHENSIVE  -     METABOLIC PANEL, COMPREHENSIVE    3. Dyslipidemia, goal LDL below 100  Stable, no myalgias. I do not recommend any change in medications.   -     LIPID PANEL  -     LIPID PANEL    4. Chronic bilateral low back pain with bilateral sciatica  Stable and well-managed. No change in medications. 5. Morbid obesity ([address]  Encouraged pt to continue healthy eating habits and exercise. Will continue to monitor for improvements or changes. 6. Anxiety  Stable and well-managed. No change in medications. Lab results and schedule of future lab studies reviewed with patient. Reviewed diet, exercise and weight control. Written by J Luis Fagan, as dictated by Rylee Levi MD.     Current diagnosis and concerns discussed with pt at length. Understands risks and benefits or current treatment plan and medications and accepts the treatment and medication with any possible risks. Pt asks appropriate questions which were answered. Pt instructed to call with any concerns or problems. This note will not be viewable in [address]

## 2019-07-19 LAB
ALBUMIN SERPL-MCNC: 4.2 G/DL (ref 3.5–5.5)
ALBUMIN/CREAT UR: 8.7 MG/G CREAT (ref 0–30)
ALBUMIN/GLOB SERPL: 1.6 {RATIO} (ref 1.2–2.2)
ALP SERPL-CCNC: 91 IU/L (ref 39–117)
ALT SERPL-CCNC: 21 IU/L (ref 0–44)
AST SERPL-CCNC: 15 IU/L (ref 0–40)
BILIRUB SERPL-MCNC: 0.4 MG/DL (ref 0–1.2)
BUN SERPL-MCNC: 8 MG/DL (ref 6–24)
BUN/CREAT SERPL: 10 (ref 9–20)
CALCIUM SERPL-MCNC: 9.3 MG/DL (ref 8.7–10.2)
CHLORIDE SERPL-SCNC: 96 MMOL/L (ref 96–106)
CHOLEST SERPL-MCNC: 153 MG/DL (ref 100–199)
CO2 SERPL-SCNC: 27 MMOL/L (ref 20–29)
CREAT SERPL-MCNC: 0.82 MG/DL (ref 0.76–1.27)
CREAT UR-MCNC: 130 MG/DL
EST. AVERAGE GLUCOSE BLD GHB EST-MCNC: 324 MG/DL
GLOBULIN SER CALC-MCNC: 2.7 G/DL (ref 1.5–4.5)
GLUCOSE SERPL-MCNC: 201 MG/DL (ref 65–99)
HBA1C MFR BLD: 12.9 % (ref 4.8–5.6)
HDLC SERPL-MCNC: 41 MG/DL
INTERPRETATION, 910389: NORMAL
LDLC SERPL CALC-MCNC: 100 MG/DL (ref 0–99)
Lab: NORMAL
MICROALBUMIN UR-MCNC: 11.3 UG/ML
POTASSIUM SERPL-SCNC: 4.1 MMOL/L (ref 3.5–5.2)
PROT SERPL-MCNC: 6.9 G/DL (ref 6–8.5)
SODIUM SERPL-SCNC: 136 MMOL/L (ref 134–144)
TRIGL SERPL-MCNC: 62 MG/DL (ref 0–149)
VLDLC SERPL CALC-MCNC: 12 MG/DL (ref 5–40)

## 2019-07-24 DIAGNOSIS — M25.532 LEFT WRIST PAIN: ICD-10-CM

## 2019-07-24 RX ORDER — OMEPRAZOLE 20 MG/1
CAPSULE, DELAYED RELEASE ORAL
Qty: 30 CAP | Refills: 0 | OUTPATIENT
Start: 2019-07-24 | End: 2020-03-17

## 2019-07-24 RX ORDER — GABAPENTIN 300 MG/1
CAPSULE ORAL
Qty: 90 CAP | Refills: 0 | Status: SHIPPED | OUTPATIENT
Start: 2019-07-24 | End: 2019-08-01 | Stop reason: SDUPTHER

## 2019-07-29 ENCOUNTER — OFFICE VISIT (OUTPATIENT)
Dept: SLEEP MEDICINE | Age: 44
End: 2019-07-29

## 2019-07-29 ENCOUNTER — DOCUMENTATION ONLY (OUTPATIENT)
Dept: SLEEP MEDICINE | Age: 44
End: 2019-07-29

## 2019-07-29 VITALS
DIASTOLIC BLOOD PRESSURE: 99 MMHG | SYSTOLIC BLOOD PRESSURE: 143 MMHG | WEIGHT: 315 LBS | OXYGEN SATURATION: 97 % | BODY MASS INDEX: 44.1 KG/M2 | HEART RATE: 93 BPM | HEIGHT: 71 IN

## 2019-07-29 DIAGNOSIS — I10 ESSENTIAL HYPERTENSION: ICD-10-CM

## 2019-07-29 DIAGNOSIS — Z86.59 H/O: DEPRESSION: ICD-10-CM

## 2019-07-29 DIAGNOSIS — G47.33 OSA TREATED WITH BIPAP: Primary | ICD-10-CM

## 2019-07-29 NOTE — PATIENT INSTRUCTIONS
[address] [facility] [place]. [place], [address]  Tel.  [phone]  Fax. 100 [facility] 60  [place], [address] [facility]  Tel.  [phone]  Fax. [phone] [address]  Tel.  [phone]  Fax. [phone]     Learning About CPAP for Sleep Apnea  What is CPAP? CPAP is a small machine that you use at home every night while you sleep. It increases air pressure in your throat to keep your airway open. When you have sleep apnea, this can help you sleep better so you feel much better. CPAP stands for \"continuous positive airway pressure. \"  The CPAP machine will have one of the following:  · A mask that covers your nose and mouth  · Prongs that fit into your nose  · A mask that covers your nose only, the most common type. This type is called NCPAP. The N stands for \"nasal.\"  Why is it done? CPAP is usually the best treatment for obstructive sleep apnea. It is the first treatment choice and the most widely used. Your doctor may suggest CPAP if you have:  · Moderate to severe sleep apnea. · Sleep apnea and coronary artery disease (CAD) or heart failure. How does it help? · CPAP can help you have more normal sleep, so you feel less sleepy and more alert during the daytime. · CPAP may help keep heart failure or other heart problems from getting worse. · NCPAP may help lower your blood pressure. · If you use CPAP, your bed partner may also sleep better because you are not snoring or restless. What are the side effects? Some people who use CPAP have:  · A dry or stuffy nose and a sore throat. · Irritated skin on the face. · Sore eyes. · Bloating. If you have any of these problems, work with your doctor to fix them. Here are some things you can try:  · Be sure the mask or nasal prongs fit well. · See if your doctor can adjust the pressure of your CPAP. · If your nose is dry, try a humidifier.   · If your nose is runny or stuffy, try decongestant medicine or a steroid nasal spray. If these things do not help, you might try a different type of machine. Some machines have air pressure that adjusts on its own. Others have air pressures that are different when you breathe in than when you breathe out. This may reduce discomfort caused by too much pressure in your nose. Where can you learn more? Go to Yoomly.be  Enter Nancy Jaimes in the search box to learn more about \"Learning About CPAP for Sleep Apnea. \"   © 5528-5371 Healthwise, Incorporated. Care instructions adapted under license by [facility] [employer] (which disclaims liability or warranty for this information). This care instruction is for use with your licensed healthcare professional. If you have questions about a medical condition or this instruction, always ask your healthcare professional. [address] any warranty or liability for your use of this information. Content Version: 8.3.77165; Last Revised: January 11, 2010  PROPER SLEEP HYGIENE    What to avoid  · Do not have drinks with caffeine, such as coffee or black tea, for 8 hours before bed. · Do not smoke or use other types of tobacco near bedtime. Nicotine is a stimulant and can keep you awake. · Avoid drinking alcohol late in the evening, because it can cause you to wake in the middle of the night. · Do not eat a big meal close to bedtime. If you are hungry, eat a light snack. · Do not drink a lot of water close to bedtime, because the need to urinate may wake you up during the night. · Do not read or watch TV in bed. Use the bed only for sleeping and sexual activity. What to try  · Go to bed at the same time every night, and wake up at the same time every morning. Do not take naps during the day. · Keep your bedroom quiet, dark, and cool. · Get regular exercise, but not within 3 to 4 hours of your bedtime. .  · Sleep on a comfortable pillow and mattress.   · If watching the clock makes you anxious, turn it facing away from you so you cannot see the time. · If you worry when you lie down, start a worry book. Well before bedtime, write down your worries, and then set the book and your concerns aside. · Try meditation or other relaxation techniques before you go to bed. · If you cannot fall asleep, get up and go to another room until you feel sleepy. Do something relaxing. Repeat your bedtime routine before you go to bed again. · Make your house quiet and calm about an hour before bedtime. Turn down the lights, turn off the TV, log off the computer, and turn down the volume on music. This can help you relax after a busy day. Drowsy Driving: The [employer] cites drowsiness as a causing factor in more than 293,347 police reported crashes annually, resulting in 76,000 injuries and 1,500 deaths. Other surveys suggest 55% of people polled have driven while drowsy in the past year, 23% had fallen asleep but not crashed, 3% crashed, and 2% had and accident due to drowsy driving. Who is at risk? Young Drivers: One study of drowsy driving accidents states that 55% of the drivers were under 25 years. Of those, 75% were male. Shift Workers and Travelers: People who work overnight or travel across time zones frequently are at higher risk of experiencing Circadian Rhythm Disorders. They are trying to work and function when their body is programed to sleep. Sleep Deprived: Lack of sleep has a serious impact on your ability to pay attention or focus on a task. Consistently getting less than the average of 8 hours your body needs creates partial or cumulative sleep deprivation. Untreated Sleep Disorders: Sleep Apnea, Narcolepsy, R.L.S., and other sleep disorders (untreated) prevent a person from getting enough restful sleep. This leads to excessive daytime sleepiness and increases the risk for drowsy driving accidents by up to 7 times.   Medications / Alcohol: Even over the counter medications can cause drowsiness. Medications that impair a drivers attention should have a warning label. Alcohol naturally makes you sleepy and on its own can cause accidents. Combined with excessive drowsiness its effects are amplified. Signs of Drowsy Driving:   * You don't remember driving the last few miles   * You may drift out of your trinh   * You are unable to focus and your thoughts wander   * You may yawn more often than normal   * You have difficulty keeping your eyes open / nodding off   * Missing traffic signs, speeding, or tailgating  Prevention-   Good sleep hygiene, lifestyle and behavioral choices have the most impact on drowsy driving. There is no substitute for sleep and the average person requires 8 hours nightly. If you find yourself driving drowsy, stop and sleep. Consider the sleep hygiene tips provided during your visit as well. Medication Refill Policy: Refills for all medications require 1 week advance notice. Please have your pharmacy fax a refill request. We are unable to fax, or call in \"controled substance\" medications and you will need to pick these prescriptions up from our office. MoneyLion Activation    Thank you for requesting access to MoneyLion. Please follow the instructions below to securely access and download your online medical record. MoneyLion allows you to send messages to your doctor, view your test results, renew your prescriptions, schedule appointments, and more. How Do I Sign Up? 1. In your internet browser, go to [email or web address] [employer] Enter[employer]eshart. 2. Click on the First Time User? Click Here link in the Sign In box. You will see the New Member Sign Up page. 3. Enter your MoneyLion Access Code exactly as it appears below. You will not need to use this code after youve completed the sign-up process. If you do not sign up before the expiration date, you must request a new code.     MoneyLion Access Code: [email or web address]  Expires: 2019  9:40 AM (This is the date your Lumier access code will )    4. Enter the last four digits of your Social Security Number (xxxx) and Date of Birth (mm/dd/yyyy) as indicated and click Submit. You will be taken to the next sign-up page. 5. Create a Lumier ID. This will be your Lumier login ID and cannot be changed, so think of one that is secure and easy to remember. 6. Create a Lumier password. You can change your password at any time. 7. Enter your Password Reset Question and Answer. This can be used at a later time if you forget your password. 8. Enter your e-mail address. You will receive e-mail notification when new information is available in [address] 9. Click Sign Up. You can now view and download portions of your medical record. 10. Click the Download Summary menu link to download a portable copy of your medical information. Additional Information    If you have questions, please call [phone]. Remember, Lumier is NOT to be used for urgent needs. For medical emergencies, dial 911.

## 2019-07-29 NOTE — PROGRESS NOTES
· Cleaning instructions reviewed. He expressed understanding. · Patient may benefit from a Respironics Dreamwear Nasal Pillow mask / medium.

## 2019-07-29 NOTE — PROGRESS NOTES
[address] [place]. [place], [address]  Tel.  [phone]  Fax. 100 [facility] 60  [place], [address] [facility] [address]  Tel.  [phone]  Fax. [phone] [address] [facility] [address]  Tel.  [phone]  Fax. [phone]     S>Dee Black is a 45 y.o. male seen for a positive airway pressure follow-up. He reports no problems using the device. He is 80% compliant over the past 30 days. The following problems are identified:    Drowsiness no Problems exhaling no   Snoring no Forget to put on no   Mask Comfortable yes Can't fall asleep no   Dry Mouth no Mask falls off no   Air Leaking no Frequent awakenings no       He admits that his sleep has improved on PAP therapy using under the nose mask and heated tubing. He reports of mold under the lid of the water reservoir cover he has cleaned it periodically - no issues now. Filter need frequent replacement, receive the filters from the [facility], has to call twice to get them delivered. He reports of feeling tired on waking and takes time for him to get going on several nights he wakes up feeling as if he has not slept. He reports of waking up with a headache and has constant headaches. He gets winded easily and continues to feel sleepy during the day. He reports of sweating around the nose area attributed to the silicone pressing against the skin will notice white bumps, resolves with acne cream. Uses Chlrox wipes to clean the mask and machine as it gets very gina in his department.     CO2 28   18 - 29 mmol/L       Allergies   Allergen Reactions    Latex Hives    Clindamycin-Benzoyl Peroxide Rash    Metformin Diarrhea       He has a current medication list which includes the following prescription(s): omeprazole, atenolol, clobetasol, insulin glargine-lixisenatide, insulin lispro, glipizide sr, spironolactone, alprazolam, albuterol, rosuvastatin, gabapentin, hydrochlorothiazide, fexofenadine, ketorolac, diltiazem cd, lisinopril, rich pen needle, bd ultra-fine mini pen needle, onetouch delica lancets, onetouch ultra test, duloxetine, lancets, blood-glucose meter, cyclobenzaprine, tramadol, meloxicam, and gabapentin. Greer Challenger He  has a past medical history of Arthritis, Asthma, Chronic back pain (04/11/2011), Depression, Diabetes ([facility] [address] Dyslipidemia, HTN (hypertension), Morbid obesity ([facility] JENNIFER (obstructive sleep apnea) (4/12/2012), and Psychotic disorder ([facility] [place] Sleepiness Score: 5   and Modified F.O.S.Q. Score Total / 2: 17   which reflect improved sleep quality over therapy time. O>    Visit Vitals  BP (!) 143/99   Pulse 93   Ht 5' 11\" (1.803 m)   Wt (!) 388 lb (176 kg)   SpO2 97%   BMI 54.12 kg/m²         General:   Not in acute distress   Eyes:  Anicteric sclerae, no obvious strabismus   Nose:  No obvious nasal septum deviation    Oropharynx:   Class 4 oropharyngeal outlet, thick tongue base, uvula not seen due to low-lying soft palate, narrow tonsilo-pharyngeal pilars   Tonsils:   tonsils are not visualized due to low-lying soft palate   Neck:   midline trachea   Chest/Lungs:  Equal lung expansion, clear on auscultation    CVS:  Normal rate, regular rhythm; no JVD   Skin:  Warm to touch; no obvious rashes   Neuro:  No focal deficits ; no obvious tremor    Psych:  Normal affect,  normal countenance;           A>    ICD-10-CM ICD-9-CM    1. JENNIFER treated with BiPAP G47.33 327.23 SLEEP LAB (PAP TITRATION)      AMB SUPPLY ORDER   2. Sleep-related hypoventilation G47.36 327.29 SLEEP LAB (PAP TITRATION)   3. BMI 50.0-59.9, adult ([facility] Z68.43 V85.43    4. Essential hypertension I10 401.9    5. H/O: depression Z86.59 V11.8      AHI = 25.1 (2017). On Respironics :  CPAP 7 cmH2O. Compliant:      yes    Therapeutic Response:  Positive    P>    Orders Placed This Encounter    AMB SUPPLY ORDER     Diagnosis: Sleep Apnea ICD-10 Code (G47.30); ICD-9 Code (780.57).     CPAP mask - Respironics Dreamwear Nasal Pillow mask / medium with appropriate headgear. Johan Fermin MD, FAASM; NPI: [id number]  Electronically signed. 07/29/19    SLEEP LAB (PAP TITRATION)     Standing Status:   Future     Standing Expiration Date:   1/29/2020     Scheduling Instructions:      Perform Bi-level Titration. Order Specific Question:   Reason for Exam     Answer:   JENNIFER / OHS     * Mask evaluation / cleaning instruction review done in the office - see tech notes. * We have recommended a dedicated weight loss through appropriate diet and an exercise regiment as significant weight reduction has been shown to reduce severity of obstructive sleep apnea. *   Follow-up and Dispositions    · Return if symptoms worsen or fail to improve. * He was asked to contact our office for any problems regarding PAP therapy. * Counseling was provided regarding the importance of regular PAP use and on proper sleep hygiene and safe driving. * Re-enforced proper and regular cleaning for the device. Thank you for allowing us to participate in your patient's medical care. Citlali Irby MD, FAASM  Electronically signed.  07/29/19

## 2019-08-01 ENCOUNTER — OFFICE VISIT (OUTPATIENT)
Dept: INTERNAL MEDICINE CLINIC | Age: 44
End: 2019-08-01

## 2019-08-01 RX ORDER — GUANFACINE 4 MG/1
4 TABLET, EXTENDED RELEASE ORAL DAILY
Refills: 4 | COMMUNITY
Start: 2019-07-30 | End: 2020-01-23

## 2019-08-01 NOTE — PROGRESS NOTES
CC:  Diabetes management/education    S/O: Van De Leon is a 45 y.o. male referred by Dr. Kamlesh Gill MD for diabetes management. He is also due for a Comprehensive Medication review and has 7 adherence TIPS to be addressed via Outcomes MTM. HPI: Patient here for follow up. Patient inconsistent with follow up and we have had a hard time really maximizing his medications as he also is consistent with checking his blood sugars. Recently saw Dr. Marlyn Gambino for pain management who started him on guanfacine ER 4mg once daily. He is on his 3rd day of this with no issues. Current Diabetes Regimen:  Soliqua 50 units once daily  Humalog 12 units with meals  Glipizide ER 5mg 2 tabs daily    ROS:   Patient denies hypoglycemic and hyperglycemic signs/symptoms, chest pain, shortness of breath, neuropathy, vision changes, and any foot changes. Self-Monitoring Blood Glucose:  Most days - 200's in the morning  It was 300 or 350's in the morning    Diet:  Diet doesn't seem all that bad per his report but then he'll have some time when he'll eat candy, etc and knows this sends his sugars up. His inconsistent diet also makes him inconsistent with his medications and then the sugars are all over the place.  Does have some lows during the day if he doesn't eat which I would likely attribute to the glipizide - this then makes him think that he needs less insulin and starts the whole process over    Exercise:  Hasn't been as consistent working out but hopes to start soon  Limited by back pain        Data reviewed:  Lab Results   Component Value Date/Time    Hemoglobin A1c 12.9 (H) 07/18/2019 10:48 AM    Hemoglobin A1c (POC) 6.8 02/03/2014 11:30 AM         Diabetes Health Maintenance:  Last eye exam: 8/2017 - confirm with patient  Last foot exam: 7/2019  Last influenza vaccine: 12/2018 - due this fall  Last Pneumovax 23 vaccine: 8/2018  Last Prevnar-13 vaccine: not indicated  Hepatitis B Series:  unknown  ASA Therapy: none  ACE/ARB Therapy: lisinopril  Statin Therapy: rosuvastatin    Assessment/Plan:     1)  Diabetes:  a1c not at goal and blood sugars also not at goal. Most of the issue is due to inconsistency with medication. Patient assumes he's on too much insulin. Educated him about insulin usually being weight based and that he's on a lower dose. Advised him to take Glipizide ER 5mg in the morning and another tablet int he afternoon to prevent low blood sugars midday which then cause him not to take his insulin. Sent in order for freestyle demetris to see if that's covered as it would really help us be able to see where his sugars are and get better control. I also think he would be more confident to take medications if he could see where sugars are running more consistently. Advised him to increase Soliqua by 2 units every 3 days until we can get these fastings <200    2) CMR: A thorough medication review was completed and medications were reconciled in both OutcomesMTM and in patients chart. All recommended TIPs were addressed during the visit. Patient was provided with a Take Away document which includes a medication list with indications and any necessary notes. Patient verbalized understanding of the information presented and all of the patients questions were answered. AVS was handed to the patient and information reviewed. Patient advised to call me or Dr. Maria Dolores Fairchild MD with any additional questions or concerns. Follow-up: to call me if he gets the [place] for training  Notification of recommendations will be sent to Dr. Maria Dolores Fairchild MD for review.     Sona Ellsworth, PharmD, BCPS, CDE

## 2019-08-23 ENCOUNTER — TELEPHONE (OUTPATIENT)
Dept: INTERNAL MEDICINE CLINIC | Age: 44
End: 2019-08-23

## 2019-08-23 DIAGNOSIS — I10 ESSENTIAL HYPERTENSION WITH GOAL BLOOD PRESSURE LESS THAN 130/80: ICD-10-CM

## 2019-08-23 DIAGNOSIS — M25.532 LEFT WRIST PAIN: ICD-10-CM

## 2019-08-23 NOTE — TELEPHONE ENCOUNTER
Spoke to pt, advised that pharmacist stated medication is sold over the counter so insurance will not the prescription but he can buy otc, pt verbalized understanding.

## 2019-08-23 NOTE — TELEPHONE ENCOUNTER
Patient called to report that insurnace will not cover fexofenadine 180 MG Tablet and patient can not afford to pay out of pocket each month. Patient is requesting simular medication be called in that might be covered by his insurance. [facility] Tip 115      Please call patient to advise.    [phone]

## 2019-08-25 RX ORDER — HYDROCHLOROTHIAZIDE 25 MG/1
TABLET ORAL
Qty: 30 TAB | Refills: 0 | Status: SHIPPED | OUTPATIENT
Start: 2019-08-25 | End: 2019-09-22 | Stop reason: SDUPTHER

## 2019-08-25 RX ORDER — LISINOPRIL 40 MG/1
TABLET ORAL
Qty: 30 TAB | Refills: 0 | Status: SHIPPED | OUTPATIENT
Start: 2019-08-25 | End: 2019-09-22 | Stop reason: SDUPTHER

## 2019-08-25 RX ORDER — GABAPENTIN 300 MG/1
CAPSULE ORAL
Qty: 90 CAP | Refills: 4 | Status: SHIPPED | OUTPATIENT
Start: 2019-08-25 | End: 2020-02-01

## 2019-08-27 RX ORDER — OMEPRAZOLE 20 MG/1
CAPSULE, DELAYED RELEASE ORAL
Qty: 30 CAP | Refills: 0 | Status: SHIPPED | OUTPATIENT
Start: 2019-08-27 | End: 2019-09-26 | Stop reason: SDUPTHER

## 2019-11-08 RX ORDER — INSULIN GLARGINE AND LIXISENATIDE 100; 33 U/ML; UG/ML
INJECTION, SOLUTION SUBCUTANEOUS
Qty: 12 SYRINGE | Refills: 3 | Status: SHIPPED | OUTPATIENT
Start: 2019-11-08 | End: 2020-02-04 | Stop reason: SDUPTHER

## 2019-11-22 ENCOUNTER — HOSPITAL ENCOUNTER (OUTPATIENT)
Dept: SLEEP MEDICINE | Age: 44
Discharge: HOME | End: 2019-11-22
Attending: INTERNAL MEDICINE
Payer: MEDICAID

## 2019-11-22 VITALS
WEIGHT: 315 LBS | BODY MASS INDEX: 42.66 KG/M2 | HEART RATE: 91 BPM | SYSTOLIC BLOOD PRESSURE: 137 MMHG | DIASTOLIC BLOOD PRESSURE: 91 MMHG | OXYGEN SATURATION: 98 % | HEIGHT: 72 IN | TEMPERATURE: 98.9 F

## 2019-11-22 DIAGNOSIS — G47.33 OSA TREATED WITH BIPAP: ICD-10-CM

## 2019-11-22 PROCEDURE — 95811 POLYSOM 6/>YRS CPAP 4/> PARM: CPT | Performed by: INTERNAL MEDICINE

## 2019-11-27 ENCOUNTER — TELEPHONE (OUTPATIENT)
Dept: SLEEP MEDICINE | Age: 44
End: 2019-11-27

## 2019-12-12 RX ORDER — LANCETS 33 GAUGE
EACH MISCELLANEOUS
Qty: 100 LANCET | Refills: 0 | Status: SHIPPED | OUTPATIENT
Start: 2019-12-12 | End: 2020-02-24

## 2019-12-16 ENCOUNTER — OFFICE VISIT (OUTPATIENT)
Dept: SLEEP MEDICINE | Age: 44
End: 2019-12-16

## 2019-12-16 VITALS
SYSTOLIC BLOOD PRESSURE: 151 MMHG | HEIGHT: 72 IN | BODY MASS INDEX: 42.66 KG/M2 | HEART RATE: 96 BPM | OXYGEN SATURATION: 98 % | DIASTOLIC BLOOD PRESSURE: 96 MMHG | WEIGHT: 315 LBS

## 2019-12-16 DIAGNOSIS — Z86.59 H/O: DEPRESSION: ICD-10-CM

## 2019-12-16 DIAGNOSIS — I10 ESSENTIAL HYPERTENSION: ICD-10-CM

## 2019-12-16 DIAGNOSIS — G47.33 OSA (OBSTRUCTIVE SLEEP APNEA): Primary | ICD-10-CM

## 2019-12-16 NOTE — PATIENT INSTRUCTIONS
[address] [facility] [place], [address]  Tel.  [phone]  Fax. 100 [facility] 60  [place], [address] [facility]  Tel.  [phone]  Fax. [phone] [address]  Tel.  [phone]  Fax. [phone]     Learning About CPAP for Sleep Apnea  What is CPAP? CPAP is a small machine that you use at home every night while you sleep. It increases air pressure in your throat to keep your airway open. When you have sleep apnea, this can help you sleep better so you feel much better. CPAP stands for \"continuous positive airway pressure. \"  The CPAP machine will have one of the following:  · A mask that covers your nose and mouth  · Prongs that fit into your nose  · A mask that covers your nose only, the most common type. This type is called NCPAP. The N stands for \"nasal.\"  Why is it done? CPAP is usually the best treatment for obstructive sleep apnea. It is the first treatment choice and the most widely used. Your doctor may suggest CPAP if you have:  · Moderate to severe sleep apnea. · Sleep apnea and coronary artery disease (CAD) or heart failure. How does it help? · CPAP can help you have more normal sleep, so you feel less sleepy and more alert during the daytime. · CPAP may help keep heart failure or other heart problems from getting worse. · NCPAP may help lower your blood pressure. · If you use CPAP, your bed partner may also sleep better because you are not snoring or restless. What are the side effects? Some people who use CPAP have:  · A dry or stuffy nose and a sore throat. · Irritated skin on the face. · Sore eyes. · Bloating. If you have any of these problems, work with your doctor to fix them. Here are some things you can try:  · Be sure the mask or nasal prongs fit well. · See if your doctor can adjust the pressure of your CPAP. · If your nose is dry, try a humidifier.   · If your nose is runny or stuffy, try Patient is a 89y old  Female who presents with a chief complaint of Weakness. (12 Dec 2019 17:31)      INTERVAL HPI/OVERNIGHT EVENTS:  pt with no complaints  eating well, good appetite    MEDICATIONS  (STANDING):  amLODIPine   Tablet 2.5 milliGRAM(s) Oral daily  aspirin enteric coated 81 milliGRAM(s) Oral daily  atorvastatin 40 milliGRAM(s) Oral at bedtime  azithromycin  IVPB 500 milliGRAM(s) IV Intermittent every 24 hours  azithromycin  IVPB      cefTRIAXone   IVPB 1000 milliGRAM(s) IV Intermittent every 24 hours  dextrose 5%. 1000 milliLiter(s) (50 mL/Hr) IV Continuous <Continuous>  dextrose 50% Injectable 12.5 Gram(s) IV Push once  dextrose 50% Injectable 25 Gram(s) IV Push once  dextrose 50% Injectable 25 Gram(s) IV Push once  heparin  Injectable 5000 Unit(s) SubCutaneous every 12 hours  insulin lispro (HumaLOG) corrective regimen sliding scale   SubCutaneous three times a day before meals  levETIRAcetam 500 milliGRAM(s) Oral two times a day  sodium chloride 0.9%. 1000 milliLiter(s) (100 mL/Hr) IV Continuous <Continuous>    MEDICATIONS  (PRN):  dextrose 40% Gel 15 Gram(s) Oral once PRN Blood Glucose LESS THAN 70 milliGRAM(s)/deciliter  glucagon  Injectable 1 milliGRAM(s) IntraMuscular once PRN Glucose LESS THAN 70 milligrams/deciliter      REVIEW OF SYSTEMS:  CONSTITUTIONAL: No fever, weight loss, or fatigue  RESPIRATORY: No cough, wheezing, chills or hemoptysis; No shortness of breath  CARDIOVASCULAR: No chest pain, palpitations, dizziness, or leg swelling  GASTROINTESTINAL: No abdominal or epigastric pain. No nausea, vomiting, or hematemesis; No diarrhea or constipation. No melena or hematochezia.  ENDOCRINE: No heat or cold intolerance; No hair loss      Vital Signs Last 24 Hrs  T(C): 36.4 (13 Dec 2019 12:15), Max: 37.1 (12 Dec 2019 16:51)  T(F): 97.5 (13 Dec 2019 12:15), Max: 98.7 (12 Dec 2019 16:51)  HR: 70 (13 Dec 2019 12:15) (63 - 76)  BP: 109/53 (13 Dec 2019 12:15) (83/48 - 109/53)  BP(mean): --  RR: 18 (13 Dec 2019 12:15) (17 - 18)  SpO2: 100% (13 Dec 2019 12:15) (95% - 100%)    PHYSICAL EXAM:  GENERAL: NAD, well-groomed, well-developed        LABS:                        8.6    13.68 )-----------( 166      ( 12 Dec 2019 07:50 )             28.1     12    145  |  119<H>  |  45<H>  ----------------------------<  159<H>  3.9   |  20<L>  |  1.71<H>    Ca    10.0      13 Dec 2019 07:18  Phos  3.3       Mg     1.7         TPro  6.9  /  Alb  3.1<L>  /  TBili  x   /  DBili  x   /  AST  x   /  ALT  x   /  AlkPhos  x         Urinalysis Basic - ( 11 Dec 2019 13:27 )    Color: Yellow / Appearance: very cloudy / S.015 / pH: x  Gluc: x / Ketone: Negative  / Bili: Negative / Urobili: Negative mg/dL   Blood: x / Protein: 100 mg/dL / Nitrite: Negative   Leuk Esterase: Moderate / RBC: 11-25 /HPF / WBC TNTC /HPF   Sq Epi: x / Non Sq Epi: Few / Bacteria: Few      CAPILLARY BLOOD GLUCOSE      POCT Blood Glucose.: 146 mg/dL (13 Dec 2019 08:24)  POCT Blood Glucose.: 170 mg/dL (13 Dec 2019 00:11)  POCT Blood Glucose.: 219 mg/dL (12 Dec 2019 15:46)    Lipid panel:               RADIOLOGY & ADDITIONAL TESTS: decongestant medicine or a steroid nasal spray. If these things do not help, you might try a different type of machine. Some machines have air pressure that adjusts on its own. Others have air pressures that are different when you breathe in than when you breathe out. This may reduce discomfort caused by too much pressure in your nose. Where can you learn more? Go to [employer]  Enter Lucy Pura in the search box to learn more about \"Learning About CPAP for Sleep Apnea. \"   © 8800-2839 Healthwise, Incorporated. Care instructions adapted under license by [facility] [employer] (which disclaims liability or warranty for this information). This care instruction is for use with your licensed healthcare professional. If you have questions about a medical condition or this instruction, always ask your healthcare professional. [address] any warranty or liability for your use of this information. Content Version: 4.8.95220; Last Revised: January 11, 2010  PROPER SLEEP HYGIENE    What to avoid  · Do not have drinks with caffeine, such as coffee or black tea, for 8 hours before bed. · Do not smoke or use other types of tobacco near bedtime. Nicotine is a stimulant and can keep you awake. · Avoid drinking alcohol late in the evening, because it can cause you to wake in the middle of the night. · Do not eat a big meal close to bedtime. If you are hungry, eat a light snack. · Do not drink a lot of water close to bedtime, because the need to urinate may wake you up during the night. · Do not read or watch TV in bed. Use the bed only for sleeping and sexual activity. What to try  · Go to bed at the same time every night, and wake up at the same time every morning. Do not take naps during the day. · Keep your bedroom quiet, dark, and cool. · Get regular exercise, but not within 3 to 4 hours of your bedtime. .  · Sleep on a comfortable pillow and mattress.   · If watching the clock makes you anxious, turn it facing away from you so you cannot see the time. · If you worry when you lie down, start a worry book. Well before bedtime, write down your worries, and then set the book and your concerns aside. · Try meditation or other relaxation techniques before you go to bed. · If you cannot fall asleep, get up and go to another room until you feel sleepy. Do something relaxing. Repeat your bedtime routine before you go to bed again. · Make your house quiet and calm about an hour before bedtime. Turn down the lights, turn off the TV, log off the computer, and turn down the volume on music. This can help you relax after a busy day. Drowsy Driving: The [facility] 54 cites drowsiness as a causing factor in more than 651,926 police reported crashes annually, resulting in 76,000 injuries and 1,500 deaths. Other surveys suggest 55% of people polled have driven while drowsy in the past year, 23% had fallen asleep but not crashed, 3% crashed, and 2% had and accident due to drowsy driving. Who is at risk? Young Drivers: One study of drowsy driving accidents states that 55% of the drivers were under 25 years. Of those, 75% were male. Shift Workers and Travelers: People who work overnight or travel across time zones frequently are at higher risk of experiencing Circadian Rhythm Disorders. They are trying to work and function when their body is programed to sleep. Sleep Deprived: Lack of sleep has a serious impact on your ability to pay attention or focus on a task. Consistently getting less than the average of 8 hours your body needs creates partial or cumulative sleep deprivation. Untreated Sleep Disorders: Sleep Apnea, Narcolepsy, R.L.S., and other sleep disorders (untreated) prevent a person from getting enough restful sleep. This leads to excessive daytime sleepiness and increases the risk for drowsy driving accidents by up to 7 times.   Medications / Alcohol: Even over the counter medications can cause drowsiness. Medications that impair a drivers attention should have a warning label. Alcohol naturally makes you sleepy and on its own can cause accidents. Combined with excessive drowsiness its effects are amplified. Signs of Drowsy Driving:   * You don't remember driving the last few miles   * You may drift out of your trinh   * You are unable to focus and your thoughts wander   * You may yawn more often than normal   * You have difficulty keeping your eyes open / nodding off   * Missing traffic signs, speeding, or tailgating  Prevention-   Good sleep hygiene, lifestyle and behavioral choices have the most impact on drowsy driving. There is no substitute for sleep and the average person requires 8 hours nightly. If you find yourself driving drowsy, stop and sleep. Consider the sleep hygiene tips provided during your visit as well. Medication Refill Policy: Refills for all medications require 1 week advance notice. Please have your pharmacy fax a refill request. We are unable to fax, or call in \"controled substance\" medications and you will need to pick these prescriptions up from our office. [employer]    Thank you for requesting access to [employer]. Please follow the instructions below to securely access and download your online medical record. [employer] allows you to send messages to your doctor, view your test results, renew your prescriptions, schedule appointments, and more. How Do I Sign Up? 1. In your internet browser, go to [email or web address]. 2. Click on the First Time User? Click Here link in the Sign In box. You will see the New Member Sign Up page. 3. Enter your [employer] Access Code exactly as it appears below. You will not need to use this code after youve completed the sign-up process. If you do not sign up before the expiration date, you must request a new code.     [employer] Access Code: [email or web address]  Expires: 2020  3:38 PM (This is the date your iiko access code will )    4. Enter the last four digits of your Social Security Number (xxxx) and Date of Birth (mm/dd/yyyy) as indicated and click Submit. You will be taken to the next sign-up page. 5. Create a iiko ID. This will be your iiko login ID and cannot be changed, so think of one that is secure and easy to remember. 6. Create a iiko password. You can change your password at any time. 7. Enter your Password Reset Question and Answer. This can be used at a later time if you forget your password. 8. Enter your e-mail address. You will receive e-mail notification when new information is available in [address] 9. Click Sign Up. You can now view and download portions of your medical record. 10. Click the Download Summary menu link to download a portable copy of your medical information. Additional Information    If you have questions, please call [phone]. Remember, iiko is NOT to be used for urgent needs. For medical emergencies, dial 911.

## 2019-12-16 NOTE — PROGRESS NOTES
[address] [place]. [place], [address]  Tel.  [phone]  Fax. 100 [facility] 60  [place], [address] [facility]  Tel.  [phone]  Fax. [phone] 9250 [facility]  Tel.  [phone]  Fax. [phone]     S>Dee Tompkins is a 45 y.o. male seen for a positive airway pressure follow-up. He reports no problems using the device. He is 96.7% compliant over the past 30 days. The following problems are identified:    Drowsiness no Problems exhaling no   Snoring no Forget to put on no   Mask Comfortable yes Can't fall asleep no   Dry Mouth no Mask falls off no   Air Leaking no Frequent awakenings no         He admits that his sleep has improved on PAP therapy using nasal pillows mask and heated tubing and would like to continue use of CPAP therapy. Sleep testing done on Bi-Level indicated poor sleep quality due to patient not being to sleep in preferred sleeping position / due to back pain. Prefer the nasal pillows as opposed to nasal mask but has some issues \"cuts into the nose\" but is still \"much better than the previous one\" and wishes to continue to use the nasal pillows. Allergies   Allergen Reactions    Latex Hives    Clindamycin-Benzoyl Peroxide Rash    Metformin Diarrhea       He has a current medication list which includes the following prescription(s): onetouch delica plus lancet, glipizide sr, onetouch ultra blue test strip, diltiazem cd, soliqua 100/33, omeprazole, lisinopril, hydrochlorothiazide, gabapentin, albuterol, rosuvastatin, spironolactone, flash glucose scanning reader, flash glucose sensor, omeprazole, atenolol, clobetasol, insulin lispro, alprazolam, ketorolac, rich pen needle, bd ultra-fine mini pen needle, onetouch delica lancets, duloxetine, lancets, blood-glucose meter, cyclobenzaprine, tramadol, meloxicam, fexofenadine, and guanfacine er. .      He  has a past medical history of Arthritis, Asthma, Chronic back pain (04/11/2011), Depression, Diabetes ([facility] Dyslipidemia, HTN (hypertension), Morbid obesity ([facility] JENNIFER (obstructive sleep apnea) (4/12/2012), and Psychotic disorder ([facility] O>    Visit Vitals  BP (!) 151/96 (BP 1 Location: Left arm)   Pulse 96   Ht 5' 11.5\" (1.816 m)   Wt (!) 385 lb 12.8 oz (175 kg)   SpO2 98%   BMI 53.06 kg/m²         General:   Not in acute distress   Eyes:  Anicteric sclerae, no obvious strabismus   Nose:  No obvious nasal septum deviation    Oropharynx:   Class 4 oropharyngeal outlet, thick tongue base, uvula not seen due to low-lying soft palate, narrow tonsilo-pharyngeal pilars   Tonsils:   tonsils are not visualized due to low-lying soft palate   Neck:   midline trachea   Chest/Lungs:  Equal lung expansion, clear on auscultation    CVS:  Normal rate, regular rhythm; no JVD   Skin:  Warm to touch; no obvious rashes   Neuro:  No focal deficits ; no obvious tremor    Psych:  Normal affect,  normal countenance;           A>    ICD-10-CM ICD-9-CM    1. JENNIFER (obstructive sleep apnea) G47.33 327.23 AMB SUPPLY ORDER   2. Sleep-related hypoventilation G47.36 327.29 BLOOD GAS, ARTERIAL   3. BMI 50.0-59.9, adult ([facility] Z68.43 V85.43    4. Essential hypertension I10 401.9    5. H/O: depression Z86.59 V11.8      AHI = 25.1 (2017). On Respironics :  CPAP 9 cmH2O. Compliant:      yes    Therapeutic Response:  Positive    P>        Orders Placed This Encounter    AMB SUPPLY ORDER     Diagnosis: Sleep Apnea ICD-10 Code (G47.30); ICD-9 Code (780.57). CPAP mask - Perform mask fit and provide patient with appropriate mask / headgear per patient preference. Teddy Cameron MD, FAASM; NPI: [id number]  Electronically signed. 12/16/19    BLOOD GAS, ARTERIAL     Standing Status:   Future     Standing Expiration Date:   6/13/2020     Scheduling Instructions:      Patient is to call [phone] to schedule testing.         * We have recommended a dedicated weight loss through appropriate diet and an exercise regiment as significant weight reduction has been shown to reduce severity of obstructive sleep apnea. *   Follow-up and Dispositions    · Return in about 1 year (around 12/16/2020), or if symptoms worsen or fail to improve. * He was asked to contact our office for any problems regarding PAP therapy. * Counseling was provided regarding the importance of regular PAP use and on proper sleep hygiene and safe driving. * Re-enforced proper and regular cleaning for the device. Thank you for allowing us to participate in your patient's medical care. Office visit exceeded 25 minutes with counseling and direction of care taking up more than 50% of the allotted time. Georgiana Ortiz MD, FAASM  Electronically signed.  12/16/19

## 2019-12-17 ENCOUNTER — DOCUMENTATION ONLY (OUTPATIENT)
Dept: SLEEP MEDICINE | Age: 44
End: 2019-12-17

## 2019-12-20 ENCOUNTER — HOSPITAL ENCOUNTER (OUTPATIENT)
Dept: PULMONOLOGY | Age: 44
Discharge: HOME | End: 2019-12-20
Attending: INTERNAL MEDICINE
Payer: MEDICAID

## 2019-12-20 LAB
ARTERIAL PATENCY WRIST A: YES
BASE DEFICIT BLDA-SCNC: 0.7 MMOL/L
BDY SITE: NORMAL
HCO3 BLDA-SCNC: 25 MMOL/L (ref 22–26)
PCO2 BLDA: 43 MMHG (ref 35–45)
PH BLDA: 7.37 [PH] (ref 7.35–7.45)
PO2 BLDA: 83 MMHG (ref 80–100)
SAO2 % BLD: 96 % (ref 92–97)
SAO2% DEVICE SAO2% SENSOR NAME: NORMAL
SPECIMEN SITE: NORMAL

## 2019-12-20 PROCEDURE — 36600 WITHDRAWAL OF ARTERIAL BLOOD: CPT

## 2019-12-20 PROCEDURE — 82803 BLOOD GASES ANY COMBINATION: CPT

## 2019-12-23 ENCOUNTER — TELEPHONE (OUTPATIENT)
Dept: SLEEP MEDICINE | Age: 44
End: 2019-12-23

## 2019-12-23 NOTE — TELEPHONE ENCOUNTER
Dr. Philipp Price,      Carlos Eladio called to let you know he had his lab work completed on Friday, 12/20/2019.

## 2019-12-27 NOTE — TELEPHONE ENCOUNTER
pH 7.37   7.35 - 7.45   Final   PCO2 43   35.0 - 45.0 mmHg Final   PO2 83   80 - 100 mmHg Final   O2 SAT 96   92 - 97 % Final   BICARBONATE 25   22 - 26 mmol/L Final   BASE DEFICIT 0.7    mmol/L Final     Reviewed with patient who is to continue CPAP Therapy.

## 2019-12-30 RX ORDER — SPIRONOLACTONE 25 MG/1
TABLET ORAL
Qty: 30 TAB | Refills: 4 | Status: SHIPPED | OUTPATIENT
Start: 2019-12-30 | End: 2020-06-02

## 2019-12-30 RX ORDER — ROSUVASTATIN CALCIUM 5 MG/1
TABLET, COATED ORAL
Qty: 30 TAB | Refills: 4 | Status: SHIPPED | OUTPATIENT
Start: 2019-12-30 | End: 2020-06-02

## 2020-01-01 NOTE — PROCEDURES
Migel Rueda [facility] 79  PULMONARY FUNCTION TEST    Name:  Yobany Paniagua  MR#:  [id number]  :  1975  ACCOUNT #:  [de-identified]  DATE OF SERVICE:  2019      ABG    PH 7.37, PCO2 43, PO2 83, and HCO3 24. ABG is within normal limits, although the PO2 is mildly lower than would be expected for a 45year-old.         Maria D Grove MD CB/ARCADIO_[email or web address]_I/[email or web address]  D:  2019 15:57  T:  2020 1:55  JOB #:  [id number]

## 2020-01-14 ENCOUNTER — TELEPHONE (OUTPATIENT)
Dept: INTERNAL MEDICINE CLINIC | Age: 45
End: 2020-01-14

## 2020-01-14 RX ORDER — INSULIN LISPRO 100 [IU]/ML
INJECTION, SOLUTION INTRAVENOUS; SUBCUTANEOUS
Qty: 15 ML | Refills: 5 | Status: SHIPPED | OUTPATIENT
Start: 2020-01-14 | End: 2020-02-04 | Stop reason: SDUPTHER

## 2020-01-16 DIAGNOSIS — I10 ESSENTIAL HYPERTENSION: ICD-10-CM

## 2020-01-16 DIAGNOSIS — I10 ESSENTIAL HYPERTENSION WITH GOAL BLOOD PRESSURE LESS THAN 130/80: ICD-10-CM

## 2020-01-16 RX ORDER — HYDROCHLOROTHIAZIDE 25 MG/1
TABLET ORAL
Qty: 30 TAB | Refills: 3 | Status: SHIPPED | OUTPATIENT
Start: 2020-01-16 | End: 2020-05-19

## 2020-01-16 RX ORDER — LISINOPRIL 40 MG/1
TABLET ORAL
Qty: 30 TAB | Refills: 3 | Status: SHIPPED | OUTPATIENT
Start: 2020-01-16 | End: 2020-05-19

## 2020-01-16 RX ORDER — ATENOLOL 50 MG/1
TABLET ORAL
Qty: 30 TAB | Refills: 5 | Status: SHIPPED | OUTPATIENT
Start: 2020-01-16 | End: 2020-07-16

## 2020-01-20 NOTE — TELEPHONE ENCOUNTER
Patient called to report that his blood sugars are not were they need to be. Patient is currently taking 14 units of Humalog after every meal with in 15 minutes of eating. Patient states that he skips meals at times. Yesterday patient ate breakfast and not again until after 5:30. His blood sugar reading at 5:30 was over 200. Patient is requesting new script for higher dosage of Soliqua to be increased to 50 in AM & 50 in PM to try and help. Please call patient to advise.    [phone]

## 2020-01-20 NOTE — TELEPHONE ENCOUNTER
What is he currently doing? We have just written that he is taking it once a day? Has he been taking it two times a day?  He needs to come in and discuss with Terrie his sugars and or me

## 2020-01-20 NOTE — TELEPHONE ENCOUNTER
Spoke with patient and provided appt 1/24/20 at 10am.  Pt states he has been taking the [place] twice a day for about a week without much improvement in his sugars. Per Dr. Barba Second pt is to continue twice a day and write down sugars to discuss at appt Thursday. Pt understood and was thankful for the call.

## 2020-01-23 ENCOUNTER — HOSPITAL ENCOUNTER (OUTPATIENT)
Dept: LAB | Age: 45
Discharge: HOME | End: 2020-01-23

## 2020-01-23 ENCOUNTER — OFFICE VISIT (OUTPATIENT)
Dept: INTERNAL MEDICINE CLINIC | Age: 45
End: 2020-01-23

## 2020-01-23 VITALS
WEIGHT: 315 LBS | TEMPERATURE: 99.1 F | HEART RATE: 86 BPM | HEIGHT: 72 IN | BODY MASS INDEX: 42.66 KG/M2 | DIASTOLIC BLOOD PRESSURE: 71 MMHG | SYSTOLIC BLOOD PRESSURE: 118 MMHG | OXYGEN SATURATION: 98 % | RESPIRATION RATE: 18 BRPM

## 2020-01-23 DIAGNOSIS — M54.41 CHRONIC BILATERAL LOW BACK PAIN WITH BILATERAL SCIATICA: ICD-10-CM

## 2020-01-23 DIAGNOSIS — I10 ESSENTIAL HYPERTENSION WITH GOAL BLOOD PRESSURE LESS THAN 130/80: ICD-10-CM

## 2020-01-23 DIAGNOSIS — G89.29 CHRONIC BILATERAL LOW BACK PAIN WITH BILATERAL SCIATICA: ICD-10-CM

## 2020-01-23 DIAGNOSIS — E78.5 DYSLIPIDEMIA, GOAL LDL BELOW 100: ICD-10-CM

## 2020-01-23 DIAGNOSIS — M54.42 CHRONIC BILATERAL LOW BACK PAIN WITH BILATERAL SCIATICA: ICD-10-CM

## 2020-01-23 LAB
ALBUMIN SERPL-MCNC: 3.8 G/DL (ref 3.5–5)
ALBUMIN/GLOB SERPL: 1 {RATIO} (ref 1.1–2.2)
ALP SERPL-CCNC: 111 U/L (ref 45–117)
ALT SERPL-CCNC: 34 U/L (ref 12–78)
ANION GAP SERPL CALC-SCNC: 6 MMOL/L (ref 5–15)
AST SERPL-CCNC: 22 U/L (ref 15–37)
BILIRUB SERPL-MCNC: 0.3 MG/DL (ref 0.2–1)
BUN SERPL-MCNC: 10 MG/DL (ref 6–20)
BUN/CREAT SERPL: 10 (ref 12–20)
CALCIUM SERPL-MCNC: 9.3 MG/DL (ref 8.5–10.1)
CHLORIDE SERPL-SCNC: 99 MMOL/L (ref 97–108)
CHOLEST SERPL-MCNC: 166 MG/DL
CO2 SERPL-SCNC: 30 MMOL/L (ref 21–32)
CREAT SERPL-MCNC: 1 MG/DL (ref 0.7–1.3)
CREAT UR-MCNC: 193 MG/DL
EST. AVERAGE GLUCOSE BLD GHB EST-MCNC: 292 MG/DL
GLOBULIN SER CALC-MCNC: 3.9 G/DL (ref 2–4)
GLUCOSE SERPL-MCNC: 162 MG/DL (ref 65–100)
HBA1C MFR BLD: 11.8 % (ref 4–5.6)
HDLC SERPL-MCNC: 46 MG/DL
HDLC SERPL: 3.6 {RATIO} (ref 0–5)
LDLC SERPL CALC-MCNC: 106.8 MG/DL (ref 0–100)
LIPID PROFILE,FLP: ABNORMAL
MICROALBUMIN UR-MCNC: 1.34 MG/DL
MICROALBUMIN/CREAT UR-RTO: 7 MG/G (ref 0–30)
POTASSIUM SERPL-SCNC: 4.7 MMOL/L (ref 3.5–5.1)
PROT SERPL-MCNC: 7.7 G/DL (ref 6.4–8.2)
SODIUM SERPL-SCNC: 135 MMOL/L (ref 136–145)
TRIGL SERPL-MCNC: 66 MG/DL (ref ?–150)
VLDLC SERPL CALC-MCNC: 13.2 MG/DL

## 2020-01-23 RX ORDER — CETIRIZINE HCL 10 MG
10 TABLET ORAL
COMMUNITY
End: 2021-02-09

## 2020-01-23 RX ORDER — POLYETHYLENE GLYCOL 3350 17 G/17G
17 POWDER, FOR SOLUTION ORAL EVERY OTHER DAY
COMMUNITY
Start: 2020-01-21 | End: 2021-09-04 | Stop reason: SDUPTHER

## 2020-01-23 RX ORDER — DICLOFENAC SODIUM 10 MG/G
GEL TOPICAL
COMMUNITY
Start: 2020-01-20

## 2020-01-23 RX ORDER — HYDROCODONE BITARTRATE AND ACETAMINOPHEN 5; 325 MG/1; MG/1
TABLET ORAL
Refills: 0 | COMMUNITY
Start: 2019-12-03 | End: 2020-02-28

## 2020-01-23 NOTE — PROGRESS NOTES
HISTORY OF PRESENT ILLNESS  Andres Nielsen is a 45 y.o. male. HPI  Diabetic Review of Systems - medication compliance: compliant all of the time, diabetic diet compliance: compliant most of the time, home glucose monitoring: is performed regularly, further diabetic ROS: no polyuria or polydipsia, no chest pain, dyspnea or TIA's, no numbness, tingling or pain in extremities. Other symptoms and concerns: 220, 99, 322, 216. He notes that if he does not eat his BG are perfect. He eats an egg white sandwhich and 40 oz of crystal light for breakfast. For dinner he has chicken breast with spinach and mixed veggies. He is not able to eat more often throughout the day, since he is currently helping his mother. Continues on 20 Humalog and his BG are too low and sometimes high. Continues on Soliqua 50 units. He notes that he was using a cough syrup for approx 1 week that was supposed to be sugar free, but his BG were elevated that week when using it. Hypertension ROS: taking medications as instructed, no medication side effects noted, no TIA's, no chest pain on exertion, no dyspnea on exertion, no swelling of ankles. New concerns: BP in office today is 118/71. Continues on HCTZ 25 mg, Lisinopril 40 mg, and Atenolol 50 mg. Hyperlipidemia:  Cardiovascular risk analysis - 45 y.o. male LDL goal is under 100. ROS: taking medications as instructed, no medication side effects noted, no TIA's, no chest pain on exertion, no dyspnea on exertion, no swelling of ankles. Tolerating meds, no myalgias or other side effects noted  New concerns: Pt's last LDL was 100 on 7/18/19. Continues on Rosuvastatin 5 mg. Breathing: Continues using CPAP nightly. He was going to switch to a BIPAP, but he may have another machine added (unspecifec machine). He continues to experience increasing SOB. He notes that after his pneumonia, he has not felt the same.       Exercise: He met with a  at the gym and he was not able to get anything done due to his hand pain. He had surgery on his R hand, and will soon follow with his L, and start working out again. Low back pain: Dr. Matilde Stinson informed him that he likely has hypermobility issues and he has not yet been officially dx with fibromyalgia. He continues on Tramadol and Meloxicam.     Review of Systems   All other systems reviewed and are negative. Physical Exam  Constitutional:       Appearance: Normal appearance. HENT:      Right Ear: Hearing, tympanic membrane and external ear normal.      Left Ear: Hearing, tympanic membrane and external ear normal.      Mouth/Throat:      Mouth: Mucous membranes are moist.      Pharynx: Oropharynx is clear. Cardiovascular:      Rate and Rhythm: Normal rate and regular rhythm. Pulmonary:      Effort: Pulmonary effort is normal.      Breath sounds: Normal breath sounds and air entry. Musculoskeletal: Normal range of motion. Skin:     General: Skin is warm and dry. Neurological:      General: No focal deficit present. Mental Status: He is alert and oriented to person, place, and time. Psychiatric:         Mood and Affect: Mood normal.         Behavior: Behavior normal.         ASSESSMENT and PLAN  Diagnoses and all orders for this visit:    1. Type II diabetes mellitus with complication, uncontrolled (HCC)  BG are not stable. Advised pt to use Soliuqa 52 units daily and take Humalog 16-18 units sliding scale. Informed pt that if he feels hungry, he needs to try to eat a protein snack to help fill him up and also avoid sugary snacks that result in raising BG to 'arian' sugars. Encouraged pt to avoid eating past 7p and try to exercise. Reminded pt that importance of exercise. Advised pt to f/u in 3 months with me. Advised pt to make an appt with Terrie.   -     MICROALBUMIN, UR, RAND W/ MICROALB/CREAT RATIO; Future  -     HEMOGLOBIN A1C WITH EAG; Future    2.  Essential hypertension with goal blood pressure less than 130/80  BP is at goal. I do not recommend any change in medications.   -     METABOLIC PANEL, COMPREHENSIVE; Future    3. Dyslipidemia, goal LDL below 100  Presumed stable, will recheck labs today. Patient is tolerating medications with no myalgias. I do not recommend any change in treatment plan. -     LIPID PANEL; Future    4. Chronic bilateral low back pain with bilateral sciatica  Stable and well-managed per Dr. Nata Garcia. No change in medications. Continues on Tramadol and Meloxicam.     Additional comments: Informed pt that his breathing difficulties may have to do with his weight gain (Pt was 374 on 11/15/19 according to [facility], 385 on 12/16/19, and 400 today in office). The additional weight puts pressure on the lungs, making it more difficult to breathe. He needs to get his conditioning back and have more frequent high protein meals     Lab results and schedule of future lab studies reviewed with patient. Reviewed diet, exercise and weight control. Written by James Goncalves, as dictated by Neema Waters MD.     Current diagnosis and concerns discussed with pt at length. Understands risks and benefits or current treatment plan and medications and accepts the treatment and medication with any possible risks. Pt asks appropriate questions which were answered. Pt instructed to call with any concerns or problems. This note will not be viewable in [address]

## 2020-02-01 DIAGNOSIS — M25.532 LEFT WRIST PAIN: ICD-10-CM

## 2020-02-01 RX ORDER — GABAPENTIN 300 MG/1
CAPSULE ORAL
Qty: 90 CAP | Refills: 1 | Status: SHIPPED | OUTPATIENT
Start: 2020-02-01 | End: 2020-04-02

## 2020-02-03 RX ORDER — BLOOD-GLUCOSE CONTROL, NORMAL
EACH MISCELLANEOUS
Qty: 100 LANCET | Refills: 3 | Status: SHIPPED | OUTPATIENT
Start: 2020-02-03

## 2020-02-04 ENCOUNTER — TELEPHONE (OUTPATIENT)
Dept: INTERNAL MEDICINE CLINIC | Age: 45
End: 2020-02-04

## 2020-02-04 RX ORDER — INSULIN LISPRO 100 [IU]/ML
INJECTION, SOLUTION INTRAVENOUS; SUBCUTANEOUS
Qty: 30 ML | Refills: 5 | Status: SHIPPED | OUTPATIENT
Start: 2020-02-04 | End: 2020-10-26 | Stop reason: SDUPTHER

## 2020-02-04 NOTE — TELEPHONE ENCOUNTER
----- Message from Jose Schultz sent at 2/4/2020 10:43 AM EST -----  Regarding: Dr. Saintclair Gash (if not patient):  Relationship of caller (if not patient):  Best contact number(s): [phone]  Name of medication and dosage if known: \"soliqua\" 52 units, 2x a day, and humalog 20 units  Is patient out of this medication (yes/no): no  [facility] [address] [place] [address] listed in chart? (yes/no): yes  Pharmacy phone number: [phone]  Date of last visit: 1/23/2020  Details to clarify the request: Dr had an order changed form 52 units from 1 a day to twice a day so the pharmacy needs new rx with current changes on it. This Rx needs prior authorization done so it can start on 3/1/2020. Pt also was told to changed dosage of humalog to 20 units when he eats something. Pt normally take 14 units of the humalog, so he thinks that he should mention that.

## 2020-02-04 NOTE — TELEPHONE ENCOUNTER
After speaking with Bertha Vargas, I will not complete the PA for the [facility] until she sees the patient on 2/11/20 to discuss his medications. Spoke with patient and advised him that he needs to keep his appt with Bertha Vargas on 2/11/20 @ 10am.  Pt understood and was thankful for the call.

## 2020-02-04 NOTE — TELEPHONE ENCOUNTER
New rx sent to pharmacy with updated sig. For soliqua and humalog. PA started via [email or web address] for soliqua.

## 2020-02-11 ENCOUNTER — OFFICE VISIT (OUTPATIENT)
Dept: INTERNAL MEDICINE CLINIC | Age: 45
End: 2020-02-11

## 2020-02-11 RX ORDER — INSULIN DEGLUDEC 200 U/ML
INJECTION, SOLUTION SUBCUTANEOUS
Qty: 5 PEN | Refills: 2 | Status: SHIPPED | OUTPATIENT
Start: 2020-02-11 | End: 2020-02-11 | Stop reason: CLARIF

## 2020-02-11 RX ORDER — INSULIN GLARGINE 100 [IU]/ML
INJECTION, SOLUTION SUBCUTANEOUS
Qty: 1 PEN | Refills: 0 | Status: SHIPPED | COMMUNITY
Start: 2020-02-11 | End: 2020-02-28 | Stop reason: SDUPTHER

## 2020-02-11 RX ORDER — INSULIN GLARGINE 100 [IU]/ML
INJECTION, SOLUTION SUBCUTANEOUS
Qty: 8 PEN | Refills: 2 | Status: SHIPPED | OUTPATIENT
Start: 2020-02-11 | End: 2020-05-03

## 2020-02-11 NOTE — PROGRESS NOTES
CC:  Diabetes management/education    S/O: Loni Villa is a 45 y.o. male referred by Dr. Mak Mccarthy MD for diabetes management. HPI:    Current Diabetes Regimen:  Soliqua 52 units twice a day  Humalog 20 to 22 units with meals  Glipizide ER 5mg 2 tabs once daily    Jardiance - years ago    ROS:   Patient denies hypoglycemic and hyperglycemic signs/symptoms, chest pain, shortness of breath, neuropathy, vision changes, and any foot changes. Self-Monitoring Blood Glucose:      Diet:  Breakfast - salad with ban, imitation crab meat, croutons, fat free dressing  Dinner -     10:23 - 210  8:11 - 192  Last night before bed 132    Exercise:          Data reviewed:  Lab Results   Component Value Date/Time    Hemoglobin A1c 11.8 (H) 01/23/2020 10:33 AM    Hemoglobin A1c (POC) 6.8 02/03/2014 11:30 AM         Diabetes Health Maintenance:  Last eye exam: 8/2017 - confirm with patient  Last foot exam: 7/2019  Last influenza vaccine: 12/2018 - did not get one this year  Last Pneumovax 23 vaccine: 8/2018  Last Prevnar-13 vaccine: not indicated  Hepatitis B Series: unknown  ASA Therapy: none  ACE/ARB Therapy: Lisinopril 40mg   Statin Therapy: Crestor 5mg     Assessment/Plan:     1. Diabetes:  a1c not at goal and patient using too much soliqua per dosing recommendations. Will separate out products. Stop soliqua. Start Bydureon 2mg once weekly. Start Basaglar 80 units once daily. Continue humalog 20-22 units with meals and glipizide ER 5mg 2 tabs daily for now and will see how sugars are running. Patient verbalized understanding of the information presented and all of the patients questions were answered. AVS was handed to the patient and information reviewed. Patient advised to call me or Dr. Mak Mccarthy MD with any additional questions or concerns. Follow-up: 2 weeks  Notification of recommendations will be sent to Dr. Mak Mccarthy MD for review.       ULURU, PharmD, BCPS, CDE

## 2020-02-11 NOTE — PATIENT INSTRUCTIONS
Starting tomorrow:    1) Stop Soliqua    2) Start Bydureon tomorrow - inject 1 syringe once weekly on the same day    3) Start Basaglar 80 units once daily    4) Continue Humalog 20 to 22 units with meals and Glipizide ER 5mg 2 tabs daily

## 2020-02-24 RX ORDER — LANCETS 33 GAUGE
EACH MISCELLANEOUS
Qty: 100 LANCET | Refills: 0 | Status: SHIPPED | OUTPATIENT
Start: 2020-02-24 | End: 2020-02-28 | Stop reason: SDUPTHER

## 2020-02-28 ENCOUNTER — OFFICE VISIT (OUTPATIENT)
Dept: INTERNAL MEDICINE CLINIC | Age: 45
End: 2020-02-28

## 2020-02-28 DIAGNOSIS — Z71.89 ENCOUNTER FOR MEDICATION REVIEW AND COUNSELING: ICD-10-CM

## 2020-02-28 NOTE — PROGRESS NOTES
CC:  Diabetes management/education    S/O: Melly Song is a 45 y.o. male referred by Dr. Alize Morgan MD for diabetes management and a comprehensive medications review per [facility]. HPI: Patient here for follow up. Last visit we discontinued his Zelpha Real and started him on Basaglar 80 units and Bydureon once weekly. Current Diabetes Regimen:  Bydureon Pen 2mg once weekly  Basaglar 80 units once daily  Humalog 30 units with meals  Glipizide ER 5mg 2 tabs once daily    ROS:   Patient denies hypoglycemic and hyperglycemic signs/symptoms, chest pain, shortness of breath, neuropathy, vision changes, and any foot changes. Self-Monitoring Blood Glucose:                Diet:  Still watching carbs  States he doesn't eat very often because often doesn't feel hungry      Exercise:  Plans to get back into the gym    Other Medications:    Current Outpatient Medications   Medication Sig    ONETOUCH ULTRA BLUE TEST STRIP strip USE TO TEST BLOOD GLUCOSE THREE TIMES DAILY    exenatide microspheres (BYDUREON) 2 mg/0.65 mL pnij 0.65 mL by SubCUTAneous route every seven (7) days.  insulin glargine (BASAGLAR KWIKPEN U-100 INSULIN) 100 unit/mL (3 mL) inpn Inject 80 units once daily subcutaneously    insulin lispro (HUMALOG KWIKPEN INSULIN) 100 unit/mL kwikpen INJECT 20 UNITS UNDER THE SKIN BEFORE BREAKFAST, LUNCH, AND DINNER (Patient taking differently: INJECT 30 UNITS UNDER THE SKIN BEFORE BREAKFAST, LUNCH, AND DINNER)    lancets (ONETOUCH DELICA LANCETS) 30 gauge misc USE TO TEST BLOOD SUGAR THREE TIMES DAILY    gabapentin (NEURONTIN) 300 mg capsule TAKE ONE CAPSULE BY MOUTH THREE TIMES DAILY    cetirizine (ZYRTEC) 10 mg tablet Take 10 mg by mouth nightly.  diclofenac (VOLTAREN) 1 % gel APPLY 4 GRAMS TO AFFECTED AREA 5 TIMES A DAY IF NEEDED FOR ARTHRITIS    polyethylene glycol (MIRALAX) 17 gram/dose powder Take 17 g by mouth every other day.     atenoloL (TENORMIN) 50 mg tablet TAKE 1 TABLET BY MOUTH ONCE DAILY    lisinopril (PRINIVIL, ZESTRIL) 40 mg tablet TAKE 1 TABLET BY MOUTH EVERY DAY    hydroCHLOROthiazide (HYDRODIURIL) 25 mg tablet TAKE 1 TABLET BY MOUTH EVERY DAY    spironolactone (ALDACTONE) 25 mg tablet TAKE 1 TABLET BY MOUTH EVERY DAY    rosuvastatin (CRESTOR) 5 mg tablet TAKE 1 TABLET BY MOUTH NIGHTLY    glipiZIDE SR (GLUCOTROL XL) 5 mg CR tablet TAKE 2 TABLETS BY MOUTH DAILY    dilTIAZem CD (CARDIZEM CD) 240 mg ER capsule TAKE ONE CAPSULE BY MOUTH EVERY DAY    albuterol (PROVENTIL HFA, VENTOLIN HFA, PROAIR HFA) 90 mcg/actuation inhaler INHALE 1 PUFFS BY MOUTH EVERY 4 HOURS IF NEEDED FOR SHORTNESS OF BREATH    omeprazole (PRILOSEC) 20 mg capsule TAKE 1 CAPSULE BY MOUTH EVERY DAY    clobetasol (TEMOVATE) 0.05 % ointment Apply 1 Applicator to affected area daily.  PARVIZ PEN NEEDLE 32 gauge x 5/32\" ndle USE TO INJECT LANTUS ONCE DAILY    BD INSULIN PEN NEEDLE UF MINI 31 gauge x 3/16\" ndle USE FOR LANTUS INJECTION ONCE DAILY.  DULoxetine (CYMBALTA) 60 mg capsule take 1 capsule by mouth once daily    Blood-Glucose Meter monitoring kit Pt needs One Touch Ultra glucometer to test blood sugars three times daily E11.9    cyclobenzaprine (FLEXERIL) 10 mg tablet Take  by mouth three (3) times daily as needed for Muscle Spasm(s).  tramadol (ULTRAM) 50 mg tablet Take 100 mg by mouth four (4) times daily.  meloxicam (MOBIC) 7.5 mg tablet Take 7.5 mg by mouth daily.  ketorolac (TORADOL) 10 mg tablet TAKE 1 TABLET BY MOUTH EVERY 6 HOURS AS NEEDED     No current facility-administered medications for this visit.         Allergies   Allergen Reactions    Latex Hives    Clindamycin-Benzoyl Peroxide Rash    Guanfacine Other (comments)     Blood came out of nose and very dry mouth     Metformin Diarrhea           Data reviewed:  Lab Results   Component Value Date/Time    Hemoglobin A1c 11.8 (H) 01/23/2020 10:33 AM    Hemoglobin A1c (POC) 6.8 02/03/2014 11:30 AM         Diabetes Health Maintenance:  Last eye exam: [facility] - January 2020 - Dr. Billingsley [facility] [phone]  Last foot exam: 7/2019  Last influenza vaccine: 12/2018 - did not get one this year  Last Pneumovax 23 vaccine: 8/2018  Last Prevnar-13 vaccine: not indicated  Hepatitis B Series: unknown  ASA Therapy: none  ACE/ARB Therapy: Lisinopril 40mg   Statin Therapy: Crestor 5mg     Assessment/Plan:     1. Diabetes:  a1c not at goal <7% however blood sugars are improving. Advised patient to continue current regimen and to continue checking sugars. Will follow up in a few weeks to see how things are going. 2. CMR: A thorough medication review was completed and medications were reconciled in both [facility] and in patients chart. All recommended TIPs were addressed during the visit. Patient was provided with a Take Away document which includes a medication list with indications and any necessary notes. Patient verbalized understanding of the information presented and all of the patients questions were answered. AVS was handed to the patient and information reviewed. Patient advised to call me or Dr. Monico Saenz MD with any additional questions or concerns. Follow-up: 3 weeks  Notification of recommendations will be sent to Dr. Monico Saenz MD for review.

## 2020-03-02 DIAGNOSIS — M25.532 LEFT WRIST PAIN: ICD-10-CM

## 2020-03-02 RX ORDER — KETOROLAC TROMETHAMINE 10 MG/1
TABLET, FILM COATED ORAL
Qty: 60 TAB | Refills: 3 | Status: SHIPPED | OUTPATIENT
Start: 2020-03-02 | End: 2020-05-18

## 2020-03-14 RX ORDER — DILTIAZEM HYDROCHLORIDE 240 MG/1
CAPSULE, COATED, EXTENDED RELEASE ORAL
Qty: 30 CAP | Refills: 3 | Status: SHIPPED | OUTPATIENT
Start: 2020-03-14 | End: 2020-07-16

## 2020-03-28 RX ORDER — ALBUTEROL SULFATE 90 UG/1
AEROSOL, METERED RESPIRATORY (INHALATION)
Qty: 18 G | Refills: 4 | Status: SHIPPED | OUTPATIENT
Start: 2020-03-28 | End: 2022-05-11 | Stop reason: SDUPTHER

## 2020-03-31 RX ORDER — LANCETS 33 GAUGE
EACH MISCELLANEOUS
Qty: 100 LANCET | Refills: 0 | Status: SHIPPED | OUTPATIENT
Start: 2020-03-31 | End: 2020-05-07

## 2020-04-02 DIAGNOSIS — M25.532 LEFT WRIST PAIN: ICD-10-CM

## 2020-04-02 RX ORDER — GABAPENTIN 300 MG/1
CAPSULE ORAL
Qty: 90 CAP | Refills: 0 | Status: SHIPPED | OUTPATIENT
Start: 2020-04-02 | End: 2021-02-09

## 2020-04-21 RX ORDER — GLIPIZIDE 5 MG/1
TABLET, FILM COATED, EXTENDED RELEASE ORAL
Qty: 60 TAB | Refills: 4 | OUTPATIENT
Start: 2020-04-21

## 2020-04-28 ENCOUNTER — VIRTUAL VISIT (OUTPATIENT)
Dept: INTERNAL MEDICINE CLINIC | Age: 45
End: 2020-04-28

## 2020-04-28 DIAGNOSIS — M54.42 CHRONIC BILATERAL LOW BACK PAIN WITH BILATERAL SCIATICA: ICD-10-CM

## 2020-04-28 DIAGNOSIS — I10 ESSENTIAL HYPERTENSION WITH GOAL BLOOD PRESSURE LESS THAN 130/80: ICD-10-CM

## 2020-04-28 DIAGNOSIS — G47.33 OSA (OBSTRUCTIVE SLEEP APNEA): ICD-10-CM

## 2020-04-28 DIAGNOSIS — M54.41 CHRONIC BILATERAL LOW BACK PAIN WITH BILATERAL SCIATICA: ICD-10-CM

## 2020-04-28 DIAGNOSIS — E78.5 DYSLIPIDEMIA, GOAL LDL BELOW 100: ICD-10-CM

## 2020-04-28 DIAGNOSIS — G56.00 CARPAL TUNNEL SYNDROME, UNSPECIFIED LATERALITY: ICD-10-CM

## 2020-04-28 DIAGNOSIS — G89.29 CHRONIC BILATERAL LOW BACK PAIN WITH BILATERAL SCIATICA: ICD-10-CM

## 2020-04-28 PROBLEM — J11.1 ACUTE PHARYNGITIS WITH INFLUENZA: Status: ACTIVE | Noted: 2020-04-28

## 2020-04-28 PROBLEM — R50.9 ACUTE FEBRILE ILLNESS: Status: ACTIVE | Noted: 2020-04-28

## 2020-04-28 NOTE — PROGRESS NOTES
HISTORY OF PRESENT ILLNESS  Santos David is a 45 y.o. male. HPI   Consent: Santos David, who was seen by synchronous (real-time) audio-video technology, and/or his healthcare decision maker, is aware that this patient-initiated, Telehealth encounter on 4/28/2020 is a billable service, with coverage as determined by his insurance carrier. He is aware that he may receive a bill and has provided verbal consent to proceed: Yes. Diabetic Review of Systems - further diabetic ROS: no polyuria or polydipsia, no chest pain, dyspnea or TIA's, no numbness, tingling or pain in extremities. Other symptoms and concerns: Pt's last a1c was 11.8 on 1/23/20 with an estimated BG of 292. Pt reports that he usually checks his BG in the mornings. He notes his BG range from 76 if he eats mostly vegetables and only increased to 184 once when he had to go back home to get his insulin. Hypertension ROS: taking medications as instructed, no medication side effects noted, no TIA's, no chest pain on exertion, no dyspnea on exertion, no swelling of ankles. New concerns: BP is presumed stable. Pt does not have a BP cuff at home. Dyslipidemia:  Cardiovascular risk analysis - 45 y.o. male LDL goal is under 100. ROS: taking medications as instructed, no medication side effects noted, no TIA's, no chest pain on exertion, no dyspnea on exertion, no swelling of ankles. Tolerating meds, no myalgias or other side effects noted  New concerns: Pt's last LDL was 106.8 on 1/23/20. Back pain: Stable, pt continues to comply with Tramadol. Continues to see Dr. Tahira Dowd. He had an appt for dry needling but had to cancel it due to cold sx. Carpal tunnel: Stable, pt continues to comply with Ketoralac for hand swelling and pain PRN. He occasionally still has R hand numbness and pain. He has constant L hand numbness. Pt confirms previous surgery to relieve carpal tunnel in past.     JENNIFER: Stable, pt continues to comply with CPAP.      Pt has no acute concerns today. Review of Systems   All other systems reviewed and are negative. Physical Exam  Vitals signs reviewed. Constitutional:       General: He is not in acute distress. Appearance: Normal appearance. He is not ill-appearing, toxic-appearing or diaphoretic. HENT:      Head: Normocephalic and atraumatic. Nose: Nose normal.      Mouth/Throat:      Mouth: Mucous membranes are moist.   Eyes:      Extraocular Movements: Extraocular movements intact. Neck:      Musculoskeletal: Normal range of motion. Pulmonary:      Effort: Pulmonary effort is normal. No respiratory distress. Musculoskeletal: Normal range of motion. Neurological:      General: No focal deficit present. Mental Status: He is alert and oriented to person, place, and time. Mental status is at baseline. Cranial Nerves: No cranial nerve deficit. Coordination: Coordination normal.   Psychiatric:         Mood and Affect: Mood normal.         Behavior: Behavior normal.         Thought Content: Thought content normal.         Judgment: Judgment normal.         ASSESSMENT and PLAN  Diagnoses and all orders for this visit:    1. Type II diabetes mellitus with complication, uncontrolled ([address]  Sugars presumed stable, will recheck tomorrow. Continue to follow diabetic diet and monitor sugars. Encouraged pt to continue with his healthy eating habits. Informed pt that he needs to be sure he is maintaining his protein intake to avoid dropping BG to 70's again. Will continue to monitor for improvements or changes. -     MICROALBUMIN, UR, RAND W/ MICROALB/CREAT RATIO; Future  -     HEMOGLOBIN A1C WITH EAG; Future    2. Essential hypertension with goal blood pressure less than 130/80   BP is presumed at goal. I do not recommend any change in medications. Encouraged pt to check his BP.  -     METABOLIC PANEL, COMPREHENSIVE; Future    3.  Dyslipidemia, goal LDL below 100  Presumed stable, will recheck labs tomorrow. Patient is tolerating medications with no myalgias. I do not recommend any change in treatment plan. -     LIPID PANEL; Future    4. Chronic bilateral low back pain with bilateral sciatica  Managed by Dr. Daria Servni. Stable and well-managed with dry needling and Tramadol. Will continue to monitor for improvements or changes. 5. JENNIFER (obstructive sleep apnea)  Managed by sleep specialist. Stable and well-managed with CPAP. Will continue to monitor for improvements or changes. 6. Carpal tunnel syndrome, unspecified laterality  Stable with some noted pain and numbness. Continues on Ketoralac. Will continue to monitor for improvements or changes. Lab results and schedule of future lab studies reviewed with patient. Reviewed diet, exercise and weight control. Written by Morrison Fabry, as dictated by Carly Ramirez MD.     Current diagnosis and concerns discussed with pt at length. Understands risks and benefits or current treatment plan and medications and accepts the treatment and medication with any possible risks. Pt asks appropriate questions which were answered. Pt instructed to call with any concerns or problems.

## 2020-04-30 LAB
ALBUMIN SERPL-MCNC: 4.4 G/DL (ref 4–5)
ALBUMIN/CREAT UR: 7 MG/G CREAT (ref 0–29)
ALBUMIN/GLOB SERPL: 1.5 {RATIO} (ref 1.2–2.2)
ALP SERPL-CCNC: 89 IU/L (ref 39–117)
ALT SERPL-CCNC: 25 IU/L (ref 0–44)
AST SERPL-CCNC: 19 IU/L (ref 0–40)
BILIRUB SERPL-MCNC: 0.3 MG/DL (ref 0–1.2)
BUN SERPL-MCNC: 12 MG/DL (ref 6–24)
BUN/CREAT SERPL: 13 (ref 9–20)
CALCIUM SERPL-MCNC: 9.9 MG/DL (ref 8.7–10.2)
CHLORIDE SERPL-SCNC: 93 MMOL/L (ref 96–106)
CHOLEST SERPL-MCNC: 160 MG/DL (ref 100–199)
CO2 SERPL-SCNC: 24 MMOL/L (ref 20–29)
CREAT SERPL-MCNC: 0.93 MG/DL (ref 0.76–1.27)
CREAT UR-MCNC: 233.6 MG/DL
EST. AVERAGE GLUCOSE BLD GHB EST-MCNC: 192 MG/DL
GLOBULIN SER CALC-MCNC: 3 G/DL (ref 1.5–4.5)
GLUCOSE SERPL-MCNC: 144 MG/DL (ref 65–99)
HBA1C MFR BLD: 8.3 % (ref 4.8–5.6)
HDLC SERPL-MCNC: 43 MG/DL
INTERPRETATION, 910389: NORMAL
LDLC SERPL CALC-MCNC: 100 MG/DL (ref 0–99)
Lab: NORMAL
MICROALBUMIN UR-MCNC: 15.7 UG/ML
POTASSIUM SERPL-SCNC: 4.4 MMOL/L (ref 3.5–5.2)
PROT SERPL-MCNC: 7.4 G/DL (ref 6–8.5)
SODIUM SERPL-SCNC: 135 MMOL/L (ref 134–144)
TRIGL SERPL-MCNC: 85 MG/DL (ref 0–149)
VLDLC SERPL CALC-MCNC: 17 MG/DL (ref 5–40)

## 2020-05-03 RX ORDER — INSULIN GLARGINE 100 [IU]/ML
INJECTION, SOLUTION SUBCUTANEOUS
Qty: 24 ML | Refills: 1 | Status: SHIPPED | OUTPATIENT
Start: 2020-05-03 | End: 2020-07-02

## 2020-05-03 RX ORDER — EXENATIDE 2 MG/.65ML
INJECTION, SUSPENSION, EXTENDED RELEASE SUBCUTANEOUS
Qty: 4 EACH | Refills: 1 | Status: SHIPPED | OUTPATIENT
Start: 2020-05-03 | End: 2020-06-26

## 2020-05-07 RX ORDER — LANCETS 33 GAUGE
EACH MISCELLANEOUS
Qty: 100 LANCET | Refills: 1 | Status: SHIPPED | OUTPATIENT
Start: 2020-05-07

## 2020-05-12 ENCOUNTER — TELEPHONE (OUTPATIENT)
Dept: INTERNAL MEDICINE CLINIC | Age: 45
End: 2020-05-12

## 2020-05-12 NOTE — TELEPHONE ENCOUNTER
Called patient to discuss insurance no longer covering Bydureon. Patient said he just picked this up yesterday - a months worth. Advised to continuing taking it and that we would re-evaluate next month when it's time to fill. I am totally fine with changing this to Ozempic - they just wouldn't cover this orginially. I think he would benefit from the extra weight loss we see with Ozempic. Patient states that sugars have been in the 120's in the mornings and then are dropping around noon and in the afternoon. Current regimen:  Basaglar 80 units once daily  Humalog 30 units with meals  Bydureon 2 mg once weekly    Advised patient to decrease Humalog to 20 units with meals. Advised that we absolutely wanted to keep Bydureon on board or a medicine like it to help with weight loss and heart protection. Patient to send me sugars in 1 week so we can make further adjustments.     Rey Doe, PharmD, BCPS, CDE      CLINICAL PHARMACY CONSULT: MED RECONCILIATION/REVIEW ADDENDUM    For Pharmacy Admin Tracking Only    PHSO: PHSO Patient?: No  Total # of Interventions Recommended: Count: 1  - Decreased Dose #: 1  Total Interventions Accepted: 1  Time Spent (min): 30

## 2020-05-18 DIAGNOSIS — M25.532 LEFT WRIST PAIN: ICD-10-CM

## 2020-05-18 RX ORDER — KETOROLAC TROMETHAMINE 10 MG/1
TABLET, FILM COATED ORAL
Qty: 60 TAB | Refills: 3 | Status: SHIPPED | OUTPATIENT
Start: 2020-05-18 | End: 2022-05-11 | Stop reason: SDUPTHER

## 2020-05-19 DIAGNOSIS — I10 ESSENTIAL HYPERTENSION WITH GOAL BLOOD PRESSURE LESS THAN 130/80: ICD-10-CM

## 2020-05-19 RX ORDER — HYDROCHLOROTHIAZIDE 25 MG/1
TABLET ORAL
Qty: 30 TAB | Refills: 3 | Status: SHIPPED | OUTPATIENT
Start: 2020-05-19 | End: 2020-10-26 | Stop reason: SDUPTHER

## 2020-05-19 RX ORDER — LISINOPRIL 40 MG/1
TABLET ORAL
Qty: 30 TAB | Refills: 3 | Status: SHIPPED | OUTPATIENT
Start: 2020-05-19 | End: 2020-10-26 | Stop reason: SDUPTHER

## 2020-06-02 RX ORDER — ROSUVASTATIN CALCIUM 5 MG/1
TABLET, COATED ORAL
Qty: 30 TAB | Refills: 4 | Status: SHIPPED | OUTPATIENT
Start: 2020-06-02 | End: 2021-01-13

## 2020-06-02 RX ORDER — SPIRONOLACTONE 25 MG/1
TABLET ORAL
Qty: 30 TAB | Refills: 4 | Status: SHIPPED | OUTPATIENT
Start: 2020-06-02 | End: 2021-01-13

## 2020-06-26 RX ORDER — EXENATIDE 2 MG/.65ML
INJECTION, SUSPENSION, EXTENDED RELEASE SUBCUTANEOUS
Qty: 4 EACH | Refills: 1 | Status: SHIPPED | OUTPATIENT
Start: 2020-06-26 | End: 2020-08-17

## 2020-07-02 RX ORDER — INSULIN GLARGINE 100 [IU]/ML
INJECTION, SOLUTION SUBCUTANEOUS
Qty: 24 ML | Refills: 1 | Status: SHIPPED | OUTPATIENT
Start: 2020-07-02 | End: 2020-08-28

## 2020-07-16 DIAGNOSIS — I10 ESSENTIAL HYPERTENSION: ICD-10-CM

## 2020-07-16 RX ORDER — ATENOLOL 50 MG/1
TABLET ORAL
Qty: 30 TAB | Refills: 5 | Status: SHIPPED | OUTPATIENT
Start: 2020-07-16 | End: 2021-03-04

## 2020-07-16 RX ORDER — DILTIAZEM HYDROCHLORIDE 240 MG/1
CAPSULE, COATED, EXTENDED RELEASE ORAL
Qty: 30 CAP | Refills: 3 | Status: SHIPPED | OUTPATIENT
Start: 2020-07-16 | End: 2020-12-31

## 2020-07-16 NOTE — TELEPHONE ENCOUNTER
CC: Phone follow-up for diabetes management    S/O: Placed an outgoing call to patient (2 identifiers used) to follow-up on diabetes. Current Diabetes Regimen:  Soliqua 30 units once daily  Glipizide ER 5mg daily    Blood Sugars:  200's in the morning but hadn't been this high. Missed dose of soliqua last night because fell asleep. Also states that had chinese with white rice with family. A/P:    Diabetes: patient stated that he thinks he would do better taking his insulin with his lunch meal vs at night but wasn't sure if he could do this. Advised him this was fine to do, just to be relatively consistent with the time of day. Will touch base in 2 weeks. Patient verbalized understanding all information presented. Will follow up with patient in 2 weeks. Notification of recommendations will be sent to Dr. Leilani Argueta for review.     Aleyda Enriquez, PharmD, BCPS, CDE Pharmacy Medication History  Admission medication history interview status for the 7/15/2020  admission is complete. See EPIC admission navigator for prior to admission medications     Medication history sources: Patient and Surescripts  Medication history source reliability: Good  Adherence assessment: Good    Significant changes made to the medication list:  -Done by pharmacy intern, Jacques. I just clarified if she has been taking rifaximin.       Additional medication history information:       Medication reconciliation completed by provider prior to medication history? No    Time spent in this activity: 15 min      Prior to Admission medications    Medication Sig Last Dose Taking? Auth Provider   famotidine (PEPCID) 20 MG tablet Take 20 mg by mouth 2 times daily as needed  Yes Unknown, Entered By History   melatonin 3 MG CAPS Take 1 capsule by mouth At Bedtime Past Week at Unknown time Yes Reported, Patient   metoclopramide (REGLAN) 10 MG tablet Take 1 tablet (10 mg) by mouth 4 times daily as needed (Nausea or Vomiting) 7/15/2020 at Unknown time Yes Kody Irving, DO   Multiple Vitamin (ONE-A-DAY ADULT VITACRAVES+DHA PO) Take 1 tablet by mouth daily Past Week at Unknown time Yes Reported, Patient   omeprazole (PRILOSEC) 20 MG DR capsule Take 2 capsules (40 mg) by mouth daily new Yes Annamaria Martinez MD   prochlorperazine (COMPAZINE) 10 MG tablet Take 1 tablet (10 mg) by mouth every 6 hours as needed for nausea or vomiting  Yes Annamaria Martinez MD   rifaximin (XIFAXAN) 550 MG TABS tablet Take 1 tablet (550 mg) by mouth 2 times daily 7/15/2020 at am Yes Marvin Guillaume MD   venlafaxine (EFFEXOR-XR) 75 MG 24 hr capsule Take 75 mg by mouth daily 7/14/2020 at pm Yes Unknown, Entered By History

## 2020-08-10 RX ORDER — PEN NEEDLE, DIABETIC 32GX 5/32"
NEEDLE, DISPOSABLE MISCELLANEOUS
Qty: 100 PEN NEEDLE | Refills: 0 | Status: SHIPPED | OUTPATIENT
Start: 2020-08-10 | End: 2021-01-05 | Stop reason: SDUPTHER

## 2020-08-17 RX ORDER — EXENATIDE 2 MG/.65ML
INJECTION, SUSPENSION, EXTENDED RELEASE SUBCUTANEOUS
Qty: 4 EACH | Refills: 1 | Status: SHIPPED | OUTPATIENT
Start: 2020-08-17 | End: 2020-10-29 | Stop reason: SDUPTHER

## 2020-08-28 RX ORDER — INSULIN GLARGINE 100 [IU]/ML
INJECTION, SOLUTION SUBCUTANEOUS
Qty: 24 ML | Refills: 1 | Status: SHIPPED | OUTPATIENT
Start: 2020-08-28 | End: 2020-11-13 | Stop reason: SDUPTHER

## 2020-09-04 ENCOUNTER — TELEPHONE (OUTPATIENT)
Dept: INTERNAL MEDICINE CLINIC | Age: 45
End: 2020-09-04

## 2020-10-26 ENCOUNTER — TRANSCRIBE ORDER (OUTPATIENT)
Dept: FAMILY MEDICINE CLINIC | Age: 45
End: 2020-10-26

## 2020-10-26 DIAGNOSIS — I10 ESSENTIAL HYPERTENSION WITH GOAL BLOOD PRESSURE LESS THAN 130/80: ICD-10-CM

## 2020-10-26 NOTE — TELEPHONE ENCOUNTER
----- Message from Segun Jaquez sent at 10/26/2020 11:55 AM EDT -----  Regarding: Marcy Salinas MD  Medication Refill    Caller (if not patient):      Relationship of caller (if not patient):      Best contact number(s): [phone]      Name of medication and dosage if known: hydroCHLOROthiazide (HYDRODIURIL) 25 mg; glipiZIDE SR (GLUCOTROL XL) 5 mg CR tablet; lisinopriL (PRINIVIL, ZESTRIL) 40 mg tablet; BD INSULIN PEN NEEDLE UF MINI 32 gauge      Is patient out of this medication (yes/no): Yes      Pharmacy name: [address], [place] [facility]    Pharmacy listed in chart? (yes/no):  Pharmacy phone number:      Details to clarify the request: pt advised new pharmacy is requesting new rx script to fill      Segun Jaquez

## 2020-10-27 RX ORDER — INSULIN LISPRO 100 [IU]/ML
INJECTION, SOLUTION INTRAVENOUS; SUBCUTANEOUS
Qty: 30 ML | Refills: 0 | Status: SHIPPED | OUTPATIENT
Start: 2020-10-27 | End: 2020-10-27

## 2020-10-27 RX ORDER — LISINOPRIL 40 MG/1
TABLET ORAL
Qty: 30 TAB | Refills: 0 | Status: SHIPPED | OUTPATIENT
Start: 2020-10-27 | End: 2020-10-27

## 2020-10-27 RX ORDER — GLIPIZIDE 5 MG/1
TABLET, FILM COATED, EXTENDED RELEASE ORAL
Qty: 60 TAB | Refills: 0 | Status: SHIPPED | OUTPATIENT
Start: 2020-10-27 | End: 2020-10-27

## 2020-10-27 RX ORDER — HYDROCHLOROTHIAZIDE 25 MG/1
TABLET ORAL
Qty: 30 TAB | Refills: 0 | Status: SHIPPED | OUTPATIENT
Start: 2020-10-27 | End: 2020-10-27

## 2020-10-29 RX ORDER — EXENATIDE 2 MG/.65ML
INJECTION, SUSPENSION, EXTENDED RELEASE SUBCUTANEOUS
Qty: 4 EACH | Refills: 0 | Status: SHIPPED | OUTPATIENT
Start: 2020-10-29 | End: 2021-01-09

## 2020-11-13 RX ORDER — PEN NEEDLE, DIABETIC 31 GX3/16"
NEEDLE, DISPOSABLE MISCELLANEOUS
Qty: 100 PEN NEEDLE | Refills: 5 | Status: SHIPPED | OUTPATIENT
Start: 2020-11-13

## 2020-11-13 RX ORDER — INSULIN GLARGINE 100 [IU]/ML
INJECTION, SOLUTION SUBCUTANEOUS
Qty: 24 ML | Refills: 1 | Status: SHIPPED | OUTPATIENT
Start: 2020-11-13 | End: 2021-01-12

## 2020-12-21 ENCOUNTER — DOCUMENTATION ONLY (OUTPATIENT)
Dept: SLEEP MEDICINE | Age: 45
End: 2020-12-21

## 2020-12-21 ENCOUNTER — VIRTUAL VISIT (OUTPATIENT)
Dept: SLEEP MEDICINE | Age: 45
End: 2020-12-21
Payer: MEDICAID

## 2020-12-21 DIAGNOSIS — G47.33 OSA (OBSTRUCTIVE SLEEP APNEA): Primary | ICD-10-CM

## 2020-12-21 PROCEDURE — 99442 PR PHYS/QHP TELEPHONE EVALUATION 11-20 MIN: CPT | Performed by: INTERNAL MEDICINE

## 2020-12-21 RX ORDER — GABAPENTIN 600 MG/1
600 TABLET ORAL 3 TIMES DAILY
COMMUNITY

## 2020-12-21 RX ORDER — CHLORZOXAZONE 500 MG/1
500 TABLET ORAL
COMMUNITY
End: 2021-01-05 | Stop reason: ALTCHOICE

## 2020-12-21 RX ORDER — MINERAL OIL
180 ENEMA (ML) RECTAL
COMMUNITY

## 2020-12-21 NOTE — PROGRESS NOTES
217 [facility]. [place], [address]  Tel.  [phone]  Fax. 100 [facility] 60  [place], [address] [facility]  Tel.  [phone]  Fax. [phone] [address]  Tel.  [phone]  Fax. [phone]         Kassandra Lee is a 39 y.o. male evaluated via telephone on 12/21/2020. Consent:  He and/or health care decision maker is aware that that he may receive a bill for this telephone service, depending on his insurance coverage, and has provided verbal consent to proceed: Yes    Patient was identified using their date of birth and street address for this audio only visit and patient was unable to connect on the audio-visual platform. Documentation:    I communicated with the patient about: His adherence and response to positive airway pressure therapy. He is using the device on 100% of the night and is 86.7% adherent to use > 4 hours over the past 30 days. His AHI has decreased from 25.1 at baseline on sleep testing performed in 2017 to current AHI of 0.6 on PAP Therapy. He admits that his sleep has improved on PAP Therapy using a nasal pillows mask and heated tubing. He reports of his mother passing away recently and has been feeling stress, his mother had been using a Bi-Level Device and he is interested in switching to this device as he feels that current PAP pressure is not adequate. [place] Sleepiness Score: 5   and Modified F.O.S.Q. Score Total / 2: 19   which reflect improved sleep quality over therapy time. Details of this discussion including any medical advice provided:     * Patient is using his PAP device regularly and benefiting form therapy,  continued use of the device at 9 cmH2O is advised. Patient was reassured that current pressure was adequately controlling his JENNIFER.     Orders Placed This Encounter    AMB SUPPLY ORDER     Diagnosis: (G47.33) JENNIFER (obstructive sleep apnea)  (primary encounter diagnosis) Replacement Supplies for Positive Airway Pressure Therapy Device:   Duration of need: 99 months.  Nasal Pillows Combo Mask (Replace) 2 per month.  Nasal Pillows (Replace) 2 per month.  Headgear 1 every 6 months.  Tubing with heating element 1 every 3 months.  Filter(s) Disposable 2 per month.  Filter(s) Non-Disposable 1 every 6 months. .   [address] [facility] for Humidifier (Replace) 1 every 6 months. Cheng Hernandez MD, [facility]; NPI: [id number]    Electronically signed. Date:- 12/21/20     * He was also advised that if he wanted to switch to a Bi-Level device he should first come in for an attended titration as central apnea can potentially emerge on this therapy. He declined as he felt the test was too uncomfortable for him. * He is familiar with the telephone monitoring application, is willing to track therapy and agrees to notify use if AHI is >5 per hour. * We have recommended a dedicated weight loss through appropriate diet and an exercise regiment as significant weight reduction has been shown to reduce severity of obstructive sleep apnea. *   Follow-up and Dispositions    · Return if symptoms worsen or fail to improve. * He was asked to contact our office for any problems regarding PAP therapy. * Counseling was provided regarding the importance of regular PAP use and on proper sleep hygiene and safe driving. * Re-enforced proper and regular cleaning for the device. Thank you for allowing us to participate in your patient's medical care. I affirm this is a Patient Initiated Episode with an Established Patient who has not had a related appointment within my department in the past 7 days or scheduled within the next 24 hours.     Total Time: minutes: 11-20 minutes    Note: not billable if this call serves to triage the patient into an appointment for the relevant concern    Fadi Goel MD, [facility]  Diplomate American Board of Sleep Medicine  Diplomate in Sleep Medicine - ABP    Electronically signed.  12/21/20

## 2020-12-21 NOTE — PATIENT INSTRUCTIONS
217 [facility]., [place]. [address] [facility], [address] Tel.  [phone] Fax. 100 [facility] 60 [place], [address] [facility] [address] Tel.  [phone] Fax. [phone] [address] [place] [address] Tel.  [phone] Fax. [phone] Learning About CPAP for Sleep Apnea What is CPAP? CPAP is a small machine that you use at home every night while you sleep. It increases air pressure in your throat to keep your airway open. When you have sleep apnea, this can help you sleep better so you feel much better. CPAP stands for \"continuous positive airway pressure. \" The CPAP machine will have one of the following: · A mask that covers your nose and mouth · Prongs that fit into your nose · A mask that covers your nose only, the most common type. This type is called NCPAP. The N stands for \"nasal.\" Why is it done? CPAP is usually the best treatment for obstructive sleep apnea. It is the first treatment choice and the most widely used. Your doctor may suggest CPAP if you have: · Moderate to severe sleep apnea. · Sleep apnea and coronary artery disease (CAD) or heart failure. How does it help? · CPAP can help you have more normal sleep, so you feel less sleepy and more alert during the daytime. · CPAP may help keep heart failure or other heart problems from getting worse. · NCPAP may help lower your blood pressure. · If you use CPAP, your bed partner may also sleep better because you are not snoring or restless. What are the side effects? Some people who use CPAP have: · A dry or stuffy nose and a sore throat. · Irritated skin on the face. · Sore eyes. · Bloating. If you have any of these problems, work with your doctor to fix them. Here are some things you can try: · Be sure the mask or nasal prongs fit well. · See if your doctor can adjust the pressure of your CPAP. · If your nose is dry, try a humidifier. · If your nose is runny or stuffy, try decongestant medicine or a steroid nasal spray. If these things do not help, you might try a different type of machine. Some machines have air pressure that adjusts on its own. Others have air pressures that are different when you breathe in than when you breathe out. This may reduce discomfort caused by too much pressure in your nose. Where can you learn more? Go to [employer] Enter A407 in the search box to learn more about \"Learning About CPAP for Sleep Apnea. \"  
© 7220-3059 Healthwise, Incorporated. Care instructions adapted under license by [facility] (which disclaims liability or warranty for this information). This care instruction is for use with your licensed healthcare professional. If you have questions about a medical condition or this instruction, always ask your healthcare professional. [address] any warranty or liability for your use of this information. Content Version: 7.1.16915; Last Revised: January 11, 2010 PROPER SLEEP HYGIENE What to avoid · Do not have drinks with caffeine, such as coffee or black tea, for 8 hours before bed. · Do not smoke or use other types of tobacco near bedtime. Nicotine is a stimulant and can keep you awake. · Avoid drinking alcohol late in the evening, because it can cause you to wake in the middle of the night. · Do not eat a big meal close to bedtime. If you are hungry, eat a light snack. · Do not drink a lot of water close to bedtime, because the need to urinate may wake you up during the night. · Do not read or watch TV in bed. Use the bed only for sleeping and sexual activity. What to try · Go to bed at the same time every night, and wake up at the same time every morning. Do not take naps during the day. · Keep your bedroom quiet, dark, and cool. · Get regular exercise, but not within 3 to 4 hours of your bedtime. Justa Staggers · Sleep on a comfortable pillow and mattress. · If watching the clock makes you anxious, turn it facing away from you so you cannot see the time. · If you worry when you lie down, start a worry book. Well before bedtime, write down your worries, and then set the book and your concerns aside. · Try meditation or other relaxation techniques before you go to bed. · If you cannot fall asleep, get up and go to another room until you feel sleepy. Do something relaxing. Repeat your bedtime routine before you go to bed again. · Make your house quiet and calm about an hour before bedtime. Turn down the lights, turn off the TV, log off the computer, and turn down the volume on music. This can help you relax after a busy day. Drowsy Driving: The [employer] cites drowsiness as a causing factor in more than 462,605 police reported crashes annually, resulting in 76,000 injuries and 1,500 deaths. Other surveys suggest 55% of people polled have driven while drowsy in the past year, 23% had fallen asleep but not crashed, 3% crashed, and 2% had and accident due to drowsy driving. Who is at risk? Young Drivers: One study of drowsy driving accidents states that 55% of the drivers were under 25 years. Of those, 75% were male. Shift Workers and Travelers: People who work overnight or travel across time zones frequently are at higher risk of experiencing Circadian Rhythm Disorders. They are trying to work and function when their body is programed to sleep. Sleep Deprived: Lack of sleep has a serious impact on your ability to pay attention or focus on a task. Consistently getting less than the average of 8 hours your body needs creates partial or cumulative sleep deprivation. Untreated Sleep Disorders: Sleep Apnea, Narcolepsy, R.L.S., and other sleep disorders (untreated) prevent a person from getting enough restful sleep. This leads to excessive daytime sleepiness and increases the risk for drowsy driving accidents by up to 7 times. Medications / Alcohol: Even over the counter medications can cause drowsiness. Medications that impair a drivers attention should have a warning label. Alcohol naturally makes you sleepy and on its own can cause accidents. Combined with excessive drowsiness its effects are amplified. Signs of Drowsy Driving: * You don't remember driving the last few miles * You may drift out of your trinh * You are unable to focus and your thoughts wander * You may yawn more often than normal 
 * You have difficulty keeping your eyes open / nodding off * Missing traffic signs, speeding, or tailgating Prevention-  
Good sleep hygiene, lifestyle and behavioral choices have the most impact on drowsy driving. There is no substitute for sleep and the average person requires 8 hours nightly. If you find yourself driving drowsy, stop and sleep. Consider the sleep hygiene tips provided during your visit as well. Medication Refill Policy: Refills for all medications require 1 week advance notice. Please have your pharmacy fax a refill request. We are unable to fax, or call in \"controled substance\" medications and you will need to pick these prescriptions up from our office. [employer] Activation Thank you for requesting access to [employer]. Please follow the instructions below to securely access and download your online medical record. [employer] allows you to send messages to your doctor, view your test results, renew your prescriptions, schedule appointments, and more. How Do I Sign Up? 1. In your internet browser, go to [email or web address] [email or web address]. 2. Click on the First Time User? Click Here link in the Sign In box. You will see the New Member Sign Up page. 3. Enter your Pro[employer]x Access Code exactly as it appears below. You will not need to use this code after youve completed the sign-up process. If you do not sign up before the expiration date, you must request a new code. Pro[employer]x Access Code: [email or web address] Expires: 2021  7:44 AM (This is the date your Pro[employer]imex access code will ) 4. Enter the last four digits of your Social Security Number (xxxx) and Date of Birth (mm/dd/yyyy) as indicated and click Submit. You will be taken to the next sign-up page. 5. Create a QingCloudt ID. This will be your Proximex login ID and cannot be changed, so think of one that is secure and easy to remember. 6. Create a Pro[employer]imex password. You can change your password at any time. 7. Enter your Password Reset Question and Answer. This can be used at a later time if you forget your password. 8. Enter your e-mail address. You will receive e-mail notification when new information is available in [address] 9. Click Sign Up. You can now view and download portions of your medical record. 10. Click the Download Summary menu link to download a portable copy of your medical information. Additional Information If you have questions, please call [phone]. Remember, Pro[employer]x is NOT to be used for urgent needs. For medical emergencies, dial 911.

## 2020-12-31 RX ORDER — GLIPIZIDE 5 MG/1
TABLET, FILM COATED, EXTENDED RELEASE ORAL
Qty: 14 TAB | Refills: 0 | Status: SHIPPED | OUTPATIENT
Start: 2020-12-31 | End: 2021-01-05 | Stop reason: SDUPTHER

## 2020-12-31 RX ORDER — DILTIAZEM HYDROCHLORIDE 240 MG/1
CAPSULE, EXTENDED RELEASE ORAL
Qty: 30 CAP | Refills: 0 | Status: SHIPPED | OUTPATIENT
Start: 2020-12-31 | End: 2021-02-01

## 2021-01-05 ENCOUNTER — VIRTUAL VISIT (OUTPATIENT)
Dept: INTERNAL MEDICINE CLINIC | Age: 46
End: 2021-01-05
Payer: MEDICAID

## 2021-01-05 DIAGNOSIS — I10 ESSENTIAL HYPERTENSION WITH GOAL BLOOD PRESSURE LESS THAN 130/80: ICD-10-CM

## 2021-01-05 DIAGNOSIS — G89.29 CHRONIC BILATERAL LOW BACK PAIN WITH BILATERAL SCIATICA: ICD-10-CM

## 2021-01-05 DIAGNOSIS — M54.42 CHRONIC BILATERAL LOW BACK PAIN WITH BILATERAL SCIATICA: ICD-10-CM

## 2021-01-05 DIAGNOSIS — G47.33 OSA (OBSTRUCTIVE SLEEP APNEA): ICD-10-CM

## 2021-01-05 DIAGNOSIS — M54.41 CHRONIC BILATERAL LOW BACK PAIN WITH BILATERAL SCIATICA: ICD-10-CM

## 2021-01-05 DIAGNOSIS — E78.5 DYSLIPIDEMIA, GOAL LDL BELOW 100: ICD-10-CM

## 2021-01-05 PROCEDURE — 99214 OFFICE O/P EST MOD 30 MIN: CPT | Performed by: INTERNAL MEDICINE

## 2021-01-05 RX ORDER — HYDROCHLOROTHIAZIDE 25 MG/1
25 TABLET ORAL DAILY
Qty: 90 TAB | Refills: 1 | Status: SHIPPED | OUTPATIENT
Start: 2021-01-05 | End: 2021-07-06

## 2021-01-05 RX ORDER — LISINOPRIL 40 MG/1
40 TABLET ORAL DAILY
Qty: 90 TAB | Refills: 1 | Status: SHIPPED | OUTPATIENT
Start: 2021-01-05 | End: 2021-07-06

## 2021-01-05 RX ORDER — GLIPIZIDE 10 MG/1
10 TABLET, FILM COATED, EXTENDED RELEASE ORAL DAILY
Qty: 90 TAB | Refills: 1 | Status: SHIPPED | OUTPATIENT
Start: 2021-01-05 | End: 2021-06-30 | Stop reason: ALTCHOICE

## 2021-01-05 RX ORDER — PEN NEEDLE, DIABETIC 31 GX3/16"
NEEDLE, DISPOSABLE MISCELLANEOUS
Qty: 100 PEN NEEDLE | Refills: 2 | Status: SHIPPED | OUTPATIENT
Start: 2021-01-05 | End: 2021-01-07 | Stop reason: SDUPTHER

## 2021-01-05 RX ORDER — PEN NEEDLE, DIABETIC 31 GX3/16"
NEEDLE, DISPOSABLE MISCELLANEOUS
Refills: 0 | OUTPATIENT
Start: 2021-01-05

## 2021-01-05 NOTE — PROGRESS NOTES
Kaycee Garrett (: 1975) is a 39 y.o. male, established patient, here for evaluation of the following chief complaint(s):   Medication Evaluation (Discuss insulin pens as well)       ASSESSMENT/PLAN:  1. Type II diabetes mellitus with complication, uncontrolled ([address]  Sugars presumed stable, will recheck today. Continue to follow diabetic diet and monitor sugars. Increased pt's Glipizide to 1 tablet 10 mg to make easier for pt compliance. Asked pt to make an appt with Caty to discuss managing sugars. He notes that he is reluctant to f/u with Renee Horowitz. He endorses that he is compliant with his medications. Had conversation with pt about food choices and cutting down on carbs. Will continue to monitor for improvements or changes. -     glipiZIDE SR (GLUCOTROL XL) 10 mg CR tablet; Take 1 Tab by mouth daily. , Normal, Disp-90 Tab, R-1  -     MICROALBUMIN, UR, RAND W/ MICROALB/CREAT RATIO; Future  -     HEMOGLOBIN A1C WITH EAG; Future    2. Essential hypertension with goal blood pressure less than 130/80  BP is presumed at goal. I do not recommend any change in medications. -     lisinopriL (PRINIVIL, ZESTRIL) 40 mg tablet; Take 1 Tab by mouth daily. Take 1 tablet by mouth once daily, Normal, Disp-90 Tab, R-1  -     hydroCHLOROthiazide (HYDRODIURIL) 25 mg tablet; Take 1 Tab by mouth daily. TAKE 1 TABLET BY MOUTH ONCE DAILY. *APPOINTMENT NEEDED, Normal, Disp-90 Tab, R-1  -     METABOLIC PANEL, COMPREHENSIVE; Future    3. Dyslipidemia, goal LDL below 100  Presumed stable, will recheck labs today. Patient is tolerating medications with no myalgias. I do not recommend any change in treatment plan. -     LIPID PANEL; Future    4. Chronic bilateral low back pain with bilateral sciatica  Stable and well-managed. No change in medications. Reviewed med list with patient and reminded him that he cannot take methotrexate and tramadol at the same time. Will continue to monitor for improvements or changes.     5. JENNIFER (obstructive sleep apnea)  Stable and well-managed. No change in medications. ..        Pt continues to f/u with Dr. Marci Hamm over the phone. SUBJECTIVE/OBJECTIVE:  HPI  Mood: Pt reports that his mother passed away on October which he is struggling with. He notes that he does not have a support system. He states that his family has been making the process worse since they keep criticizing his decisions. Diabetic Review of Systems - medication compliance: compliant all of the time, further diabetic ROS: no polyuria or polydipsia, no chest pain, dyspnea or TIA's, no numbness, tingling or pain in extremities. Other symptoms and concerns: Pt's last a1c was 8.3 on 4/29/20 with an estimated BG of 192. He notes that he is not checking his BG regularly at home. He states that his BG yesterday was 316. Pt reports that he ate vegetarian pizza yesterday. Bydureon every 7 days. 80 units insulin and 30 units before or after meals. Back pain: Stable, pt continues to comply with Tramadol. Continues on Torodol and methotrexate PRN on separate days. Carpel tunnel: Stable, pt continues to comply with Gabapentin. Knee pain: Stable, pt continues to comply with Diclofenac gel. Review of Systems   All other systems reviewed and are negative. Patient-Reported Vitals 1/5/2021   Patient-Reported Weight -   Patient-Reported Systolic  973   Patient-Reported Diastolic 81       Physical Exam  Pulmonary:      Effort: Pulmonary effort is normal.   Neurological:      General: No focal deficit present. Mental Status: He is alert and oriented to person, place, and time. Mental status is at baseline. Psychiatric:         Mood and Affect: Mood normal.         Behavior: Behavior normal.             Shekhar Hancock is being evaluated by a Virtual Visit (video visit) encounter to address concerns as mentioned above. A caregiver was present when appropriate.  Due to this being a TeleHealth encounter (During COVID-19 public health emergency), evaluation of the following organ systems was limited: Vitals/Constitutional/EENT/Resp/CV/GI//MS/Neuro/Skin/Heme-Lymph-Imm. Pursuant to the emergency declaration under the [facility], [address] [place] [address] authority and the [employer] Act, this Virtual Visit was conducted with patient's (and/or legal guardian's) consent, to reduce the patient's risk of exposure to COVID-19 and provide necessary medical care. The patient (and/or legal guardian) has also been advised to contact this office for worsening conditions or problems, and seek emergency medical treatment and/or call 911 if deemed necessary. Patient identification was verified at the start of the visit: YES    Services were provided through a video synchronous discussion virtually to substitute for in-person clinic visit. Patient and provider were located at their individual homes. Lab results and schedule of future lab studies reviewed with patient. Reviewed diet, exercise and weight control. Written by Satya Chapa, as dictated by Yosi Clements MD.     Current diagnosis and concerns discussed with pt at length. Understands risks and benefits or current treatment plan and medications and accepts the treatment and medication with any possible risks. Pt asks appropriate questions which were answered. Pt instructed to call with any concerns or problems.

## 2021-01-06 NOTE — TELEPHONE ENCOUNTER
----- Message from Kimberly Fuentes sent at 1/6/2021 10:06 AM EST -----  Regarding: Dr. Sandeep Barney (if not patient): pt      Relationship of caller (if not patient): pt      Best contact number(s): [phone]      Name of medication and dosage if known: BB mini needles      Is patient out of this medication (yes/no): yes      Pharmacy name: [address] listed in chart? (yes/no): yes  Pharmacy phone number: [phone]    Date of last visit: 1/5/21    Details to clarify the request: Lorie Spire was written for once daily, but pt needs to use it up to 3x daily. Needs updated RX, cannot refill current RX until 1/29/21.        Kimberly Fuentes

## 2021-01-07 ENCOUNTER — TELEPHONE (OUTPATIENT)
Dept: INTERNAL MEDICINE CLINIC | Age: 46
End: 2021-01-07

## 2021-01-07 RX ORDER — PEN NEEDLE, DIABETIC 31 GX3/16"
NEEDLE, DISPOSABLE MISCELLANEOUS
Qty: 100 PEN NEEDLE | Refills: 2 | Status: SHIPPED | OUTPATIENT
Start: 2021-01-07 | End: 2021-03-25

## 2021-01-07 NOTE — TELEPHONE ENCOUNTER
Per Pharmacist with Patient's [address], patient's \" Gracia Pen Needles\" are unavailable , states she is giving patient 6mm needles instead.

## 2021-01-26 LAB
ALBUMIN SERPL-MCNC: 4.3 G/DL (ref 4–5)
ALBUMIN/CREAT UR: 8 MG/G CREAT (ref 0–29)
ALBUMIN/GLOB SERPL: 1.5 {RATIO} (ref 1.2–2.2)
ALP SERPL-CCNC: 98 IU/L (ref 39–117)
ALT SERPL-CCNC: 24 IU/L (ref 0–44)
AST SERPL-CCNC: 18 IU/L (ref 0–40)
BILIRUB SERPL-MCNC: 0.3 MG/DL (ref 0–1.2)
BUN SERPL-MCNC: 14 MG/DL (ref 6–24)
BUN/CREAT SERPL: 17 (ref 9–20)
CALCIUM SERPL-MCNC: 9.7 MG/DL (ref 8.7–10.2)
CHLORIDE SERPL-SCNC: 96 MMOL/L (ref 96–106)
CHOLEST SERPL-MCNC: 170 MG/DL (ref 100–199)
CO2 SERPL-SCNC: 26 MMOL/L (ref 20–29)
CREAT SERPL-MCNC: 0.82 MG/DL (ref 0.76–1.27)
CREAT UR-MCNC: 198.4 MG/DL
EST. AVERAGE GLUCOSE BLD GHB EST-MCNC: 326 MG/DL
GLOBULIN SER CALC-MCNC: 2.9 G/DL (ref 1.5–4.5)
GLUCOSE SERPL-MCNC: 227 MG/DL (ref 65–99)
HBA1C MFR BLD: 13 % (ref 4.8–5.6)
HDLC SERPL-MCNC: 46 MG/DL
INTERPRETATION, 910389: NORMAL
LDLC SERPL CALC-MCNC: 108 MG/DL (ref 0–99)
Lab: NORMAL
MICROALBUMIN UR-MCNC: 15.4 UG/ML
POTASSIUM SERPL-SCNC: 4.3 MMOL/L (ref 3.5–5.2)
PROT SERPL-MCNC: 7.2 G/DL (ref 6–8.5)
SODIUM SERPL-SCNC: 137 MMOL/L (ref 134–144)
TRIGL SERPL-MCNC: 85 MG/DL (ref 0–149)
VLDLC SERPL CALC-MCNC: 16 MG/DL (ref 5–40)

## 2021-01-29 ENCOUNTER — TELEPHONE (OUTPATIENT)
Dept: INTERNAL MEDICINE CLINIC | Age: 46
End: 2021-01-29

## 2021-02-09 ENCOUNTER — VIRTUAL VISIT (OUTPATIENT)
Dept: INTERNAL MEDICINE CLINIC | Age: 46
End: 2021-02-09

## 2021-02-09 RX ORDER — INSULIN GLARGINE 100 [IU]/ML
INJECTION, SOLUTION SUBCUTANEOUS
Qty: 10 PEN | Refills: 5 | Status: SHIPPED | OUTPATIENT
Start: 2021-02-09 | End: 2021-06-23 | Stop reason: SDUPTHER

## 2021-02-09 NOTE — PROGRESS NOTES
CMR/Diabetes Management - Phone Visit    Patient was \"seen\" today for their comprehensive medication review per OutcomesMTM and diabetes management. HPI: Patient rescheduled last week because of pain. Pain is manageable right now. Back is constantly getting tighter - sees pain management doctor on Friday - [place]. Patients mother passed away last fall and he endorses several months of taking medications sporadically. Diabetes  Basaglar 80 units once daily  Bydureon 2mg once weekly - didn't take for month of October because was too busy going through mothers hosue  Glipizide ER 10mg once daily  Humalog - eats twice a day - 30 units twice a day - if sugar is higher, will do an extra 40 depending - anything >250, sometimes does this right before bed    Recent Blood Sugars  264 after breakfast  199 12am  255 fasting  198 fasting  196 fasting  298 4pm       Current Outpatient Medications   Medication Sig    insulin glargine (Basaglar KwikPen U-100 Insulin) 100 unit/mL (3 mL) inpn INJECT 45 UNITS SUBCUTANEOUSLY TWICE DAILY    Cartia  mg capsule Take 1 capsule by mouth once daily    spironolactone (ALDACTONE) 25 mg tablet Take 1 tablet by mouth once daily    rosuvastatin (CRESTOR) 5 mg tablet Take 1 tablet by mouth nightly    exenatide microspheres (Bydureon) 2 mg/0.65 mL pnij INJECT 2 MG SUBCUTANEOUSLY ONCE EVERY 7 DAYS    lisinopriL (PRINIVIL, ZESTRIL) 40 mg tablet Take 1 Tab by mouth daily. Take 1 tablet by mouth once daily    hydroCHLOROthiazide (HYDRODIURIL) 25 mg tablet Take 1 Tab by mouth daily. TAKE 1 TABLET BY MOUTH ONCE DAILY. *APPOINTMENT NEEDED    glipiZIDE SR (GLUCOTROL XL) 10 mg CR tablet Take 1 Tab by mouth daily.  gabapentin (NEURONTIN) 600 mg tablet Take 600 mg by mouth three (3) times daily.  fexofenadine (ALLEGRA) 180 mg tablet Take 180 mg by mouth.     insulin lispro (HUMALOG) 100 unit/mL kwikpen INJECT 30 UNITS SUBCUTANEOUSLY BEFORE BREAKFAST, LUNCH, AND DINNER    atenoloL (TENORMIN) 50 mg tablet TAKE 1 TABLET BY MOUTH EVERY DAY    albuterol (PROVENTIL HFA, VENTOLIN HFA, PROAIR HFA) 90 mcg/actuation inhaler INHALE 1 PUFFS BY MOUTH EVERY 4 HOURS IF NEEDED FOR SHORTNESS OF BREATH    omeprazole (PRILOSEC) 20 mg capsule TAKE 1 CAPSULE BY MOUTH EVERY DAY    diclofenac (VOLTAREN) 1 % gel APPLY 4 GRAMS TO AFFECTED AREA 5 TIMES A DAY IF NEEDED FOR ARTHRITIS    polyethylene glycol (MIRALAX) 17 gram/dose powder Take 17 g by mouth every other day.  clobetasol (TEMOVATE) 0.05 % ointment Apply 1 Applicator to affected area daily.  DULoxetine (CYMBALTA) 60 mg capsule take 1 capsule by mouth once daily    Blood-Glucose Meter monitoring kit Pt needs One Touch Ultra glucometer to test blood sugars three times daily E11.9    tramadol (ULTRAM) 50 mg tablet Take 100 mg by mouth four (4) times daily.  meloxicam (MOBIC) 7.5 mg tablet Take 7.5 mg by mouth daily.  Insulin Needles, Disposable, (Gracia Pen Needle) 32 gauge x 5/32\" ndle USE TO INJECT INSULIN FOUR TIMES DAILY    Insulin Needles, Disposable, (BD Ultra-Fine Mini Pen Needle) 31 gauge x 3/16\" ndle USE FOR INSULIN INJECTION ONCE DAILY.  ketorolac (TORADOL) 10 mg tablet TAKE 1 TABLET BY MOUTH EVERY 6 HOURS AS NEEDED    OneTouch Delica Plus Lancet 33 gauge misc USE TO TEST BLOOD SUGAR THREE TIMES DAILY    OneTouch Ultra Blue Test Strip strip USE TO TEST BLOOD GLUCOSE THREE TIMES DAILY    lancets (ONETOUCH DELICA LANCETS) 30 gauge misc USE TO TEST BLOOD SUGAR THREE TIMES DAILY    cyclobenzaprine (FLEXERIL) 10 mg tablet Take  by mouth three (3) times daily as needed for Muscle Spasm(s). No current facility-administered medications for this visit.         Allergies   Allergen Reactions    Latex Hives    Clindamycin-Benzoyl Peroxide Rash    Guanfacine Other (comments)     Blood came out of nose and very dry mouth     Metformin Diarrhea         Assessment/Plan:    1) Diabetes: a1c not at goal although also not indicative of current blood sugars or regimen as patient was non-adherent prior to the a1c. Patient is now taking all medications again and states is able to focus more on his own health. Re-educated patient about hypoglycemia, symptoms, causes and proper treatment. Discussed the different medications that he's currently taking and their mechanism of action/side effect profile. Patient thought bydureon was causing lows so stopped this for a while. Dicussed the lows were likely related to Glipizide and/or insulin and patient going long periods without eating. Will increase patients basal to 90 units/day and have him do 45 units twice daily. New prescription sent to pharmacy. Will follow up with patient in 2 weeks to see how things are going. 2) CMR: A thorough medication review was completed and medications were reconciled in both OutcomesMTM and in patients chart. All recommended TIPs were addressed during the visit. Patient was mailed a Take Away document which includes a medication list with indications and any necessary notes.     Kika Long, PharmD, BCPS, CDCES    CLINICAL PHARMACY CONSULT: MED RECONCILIATION/REVIEW ADDENDUM    For Pharmacy Admin Tracking Only    PHSO: PHSO Patient?: No  Total # of Interventions Recommended: Count: 4  - Increased Dose #: 1  Total Interventions Accepted: 4  Time Spent (min): 45

## 2021-02-16 RX ORDER — INSULIN LISPRO 100 [IU]/ML
INJECTION, SOLUTION INTRAVENOUS; SUBCUTANEOUS
Qty: 30 ML | Refills: 0 | Status: SHIPPED | OUTPATIENT
Start: 2021-02-16 | End: 2021-03-04

## 2021-03-12 ENCOUNTER — TELEPHONE (OUTPATIENT)
Dept: INTERNAL MEDICINE CLINIC | Age: 46
End: 2021-03-12

## 2021-03-12 NOTE — TELEPHONE ENCOUNTER
Pharmacy Progress Note - Telephone Encounter    S/O: Mr. Jaki Landis 39 y.o. male, referred by Dr. Roni Montgomery MD, for diabetes management is due for follow up appointment with Charley Newell. A/P:  - Patient is discouraged about elevated blood sugars and lack of weight loss. He is still depressed about the loss of his mother.  - He scheduled an appointment to follow up with Charley Newell on 3/18/21. Thank you,  Erum Vela Courser, PharmD  Clinical Pharmacist Specialist      CLINICAL PHARMACY CONSULT: MED RECONCILIATION/REVIEW ADDENDUM    For Pharmacy Admin Tracking Only    PHSO: PHSO Patient?: Yes  Total # of Interventions Recommended: Count: 1   Total Interventions Accepted: 1  Time Spent (min): 15

## 2021-03-18 ENCOUNTER — VIRTUAL VISIT (OUTPATIENT)
Dept: INTERNAL MEDICINE CLINIC | Age: 46
End: 2021-03-18

## 2021-03-18 NOTE — PROGRESS NOTES
CC:  Diabetes management/education    S/O: Bill Vidal is a 39 y.o. male referred by Dr. Chidi Ramirez MD for diabetes management. HPI: Patient here for 6 week follow up. He's having issues with bydureon - sometimes moves it because it hurts in the middle and sometimes the white medicine comes out of it and sometimes it bleeds a lot. He's also been struggling with depression lately. Really wants to get back into the gym because he knows that will help both his mental and physical state. Current Diabetes Regimen:  Basaglar 45 units twice daily - takes these every day  Humalog 30 units with meals unless he checks it and its high (>250) then will use 40 units -   Bydureon 2mg once a week  Glipizide ER 10mg once daily    ROS:   Patient denies hypoglycemic and hyperglycemic signs/symptoms, chest pain, shortness of breath, neuropathy, vision changes, and any foot changes.     Self-Monitoring Blood Glucose:  Hasn't checked blood sugars yet this morning  300 late at night before bed - took 40 units Humalog because it was so high  Next day it was 170 at 11:30am  Next morning 10:39am it was LO - drank juice then it went to 251  Checking sugars sporadically - only does it when he has appointments coming up      Diet:  Hasn't bought groceries in a while  Yesterday had tuna/may/relish - no bread or crackers  Diet is usually an odd combination of things depending on what he has at home  Sometimes has rice which he knows increases sugars but it's all he has    Exercise:  Not active recently - really wants to get back in the gym      Data reviewed:  His last A1c value(s) noted to be:      Lab Results   Component Value Date/Time    Hemoglobin A1c 13.0 (H) 01/25/2021 11:58 AM    Hemoglobin A1c 8.3 (H) 04/29/2020 10:23 AM    Hemoglobin A1c 11.8 (H) 01/23/2020 10:33 AM    Hemoglobin A1c (POC) 6.8 02/03/2014 11:30 AM    Hemoglobin A1c (POC) 6.1 04/12/2012 10:02 AM    Hemoglobin A1c (POC) 5.9 10/28/2011 12:10 PM Diabetes Health Maintenance:  Last eye exam: 8/2017 -confirm with patient  Last foot exam: due now  Last influenza vaccine: due this fall  Last Pneumovax 23 vaccine: 8/2018  Last Prevnar-13 vaccine: not indicated  Hepatitis B Series: unknown  ASA Therapy: none  ACE/ARB Therapy: lisinopril 40  Statin Therapy: rosuvastatin 5    Assessment/Plan:     1. Diabetes:  a1c not at goal <7% and blood sugars are also not in goal ranges. Advised patient to really be consistent with checking blood sugars and taking medicaitons so that we can see where things are and make changes accordingly. Filled out PA for demetris to see if we can get this covered sinc epatient on multiple daily injections and has a history of low blood sugars. This would really be ideal for us to be able to get his blood sugars to goal and I think he would benefit from the learning aspect of CGM. 2. Covid Vaccine- patient very interested in covid vaccine so that he can get back in the gym safely. This would benefit his physical and mental health. Will keep posted if more vaccine clinics become available. Patient verbalized understanding of the information presented and all of the patients questions were answered. AVS was handed to the patient and information reviewed. Patient advised to call me or Dr. Mauro Mathis MD with any additional questions or concerns. Follow-up: 2 weeks  Notification of recommendations will be sent to Dr. Mauro Mathis MD for review.       Antonia Zuluaga, PharmD, BCPS, CDCES    CLINICAL PHARMACY CONSULT: MED RECONCILIATION/REVIEW ADDENDUM    For Pharmacy Admin Tracking Only    PHSO: PHSO Patient?: No  Total # of Interventions Recommended: Count: 2  Total Interventions Accepted: 2  Time Spent (min): 45

## 2021-03-25 RX ORDER — BLOOD SUGAR DIAGNOSTIC
STRIP MISCELLANEOUS
Qty: 100 PEN NEEDLE | Refills: 2 | Status: SHIPPED | OUTPATIENT
Start: 2021-03-25 | End: 2021-06-18

## 2021-04-01 ENCOUNTER — VIRTUAL VISIT (OUTPATIENT)
Dept: INTERNAL MEDICINE CLINIC | Age: 46
End: 2021-04-01

## 2021-04-01 NOTE — PROGRESS NOTES
CC:  Diabetes management/education    S/O: Sierra Avelar is a 39 y.o. male referred by Dr. Lurdes Sabillon MD for diabetes management. Patient visit conducted today via phone as patient could not get the virtual visit to work. HPI: Last spoke with patient on 3/18/21 where we really emphasized checking sugars and trying to find a plan for the covid vaccine so that patient can get back in the gym which will help both his diabetes and his mood. States he hasn't been sleeping well. Did remember that he had  steroids right before blood work so thinks that probably didn't help his blood sugars either. Current Diabetes Regimen:  Bydureon 2mg once weekly - regular pen, not BCISE - just got 1 box  Glipizide ER 10mg once daily  Basaglar 45 units twice daily  Humalog 30 units with meals - 1-2 times daily with meals    ROS:   Patient denies hypoglycemic and hyperglycemic signs/symptoms, chest pain, shortness of breath, neuropathy, vision changes, and any foot changes. Self-Monitoring Blood Glucose:  218 fasting (but hadn't slept)  198, 196, 204, 226, 153    Diet:  Eating 1-2 meal per day  Sausage with eggbeaters  Spinach     Exercise:  Really wants to get back into the gym    Data reviewed:     Lab Results   Component Value Date/Time    Hemoglobin A1c 13.0 (H) 01/25/2021 11:58 AM    Hemoglobin A1c 8.3 (H) 04/29/2020 10:23 AM    Hemoglobin A1c 11.8 (H) 01/23/2020 10:33 AM    Hemoglobin A1c (POC) 6.8 02/03/2014 11:30 AM    Hemoglobin A1c (POC) 6.1 04/12/2012 10:02 AM    Hemoglobin A1c (POC) 5.9 10/28/2011 12:10 PM         Diabetes Health Maintenance:  Last eye exam:  Last foot exam:  Last influenza vaccine:  Last Pneumovax 23 vaccine:  Last Prevnar-13 vaccine:  Hepatitis B Series:   ASA Therapy: none  ACE/ARB Therapy: lisinopril 40mg  Statin Therapy: rosuvastatin 5mg    Assessment/Plan:     1. Diabetes:  a1c not at goal <7% per ADA and blood sugars are also out of goal range of  fasting.  Increase basaglar to 50 units twice daily. Patient to continue using humalog - 30 units with meals, 10 units with higher carb snacks as long as at least 3 hours since meal. Encouraged patient to eat meals more regularly. Bydureon pen will no longer be made so will need to change to either BCISE if covered or a different GLP-1.     2. Covid Vaccine: scheduled for 4/10/21      Patient verbalized understanding of the information presented and all of the patients questions were answered. AVS was handed to the patient and information reviewed. Patient advised to call me or Dr. Sandi Villa MD with any additional questions or concerns. Follow-up: 2 weeks   Notification of recommendations will be sent to Dr. Sandi Villa MD for review.       Kalyan Thomas, PharmD, BCPS, CDCES    CLINICAL PHARMACY CONSULT: MED RECONCILIATION/REVIEW ADDENDUM    For Pharmacy Admin Tracking Only    PHSO: PHSO Patient?: No  Total # of Interventions Recommended: Count: 1  - Increased Dose #: 1  Total Interventions Accepted: 1  Time Spent (min): 30

## 2021-04-10 ENCOUNTER — IMMUNIZATION (OUTPATIENT)
Dept: INTERNAL MEDICINE CLINIC | Age: 46
End: 2021-04-10
Payer: MEDICAID

## 2021-04-10 DIAGNOSIS — Z23 ENCOUNTER FOR IMMUNIZATION: Primary | ICD-10-CM

## 2021-04-10 PROCEDURE — 91300 COVID-19, MRNA, LNP-S, PF, 30MCG/0.3ML DOSE(PFIZER): CPT | Performed by: FAMILY MEDICINE

## 2021-04-10 PROCEDURE — 0001A COVID-19, MRNA, LNP-S, PF, 30MCG/0.3ML DOSE(PFIZER): CPT | Performed by: FAMILY MEDICINE

## 2021-04-20 ENCOUNTER — VIRTUAL VISIT (OUTPATIENT)
Dept: INTERNAL MEDICINE CLINIC | Age: 46
End: 2021-04-20

## 2021-04-20 NOTE — PROGRESS NOTES
CC:  Diabetes management/education    S/O: Arjun Louis is a 39 y.o. male referred by Dr. Carl Santos MD for diabetes management. Patient is seen through a virtual visit today utilizing the do[employer]. me platform. HPI: Patient is here for follow up on insulin regimen. Tried to get PA on [facility] but was denied. Patient still has bydureon but will need plan going forward. Interested in alternate testing sites because fingers hurt really bad. He received his first Covid vaccine on 4/10/21. Current Diabetes Regimen:  Bydureon 2mg once weekly - regular pen, not BCISE - just got 1 box  Glipizide ER 10mg once daily  Basaglar 50 units twice daily  --getting in both doses - missed yesterday  Humalog 30 units with meals - 1-2 times daily with meals  --takes with meals - missed yesterday    ROS:   Patient denies hypoglycemic and hyperglycemic signs/symptoms, chest pain, shortness of breath, neuropathy, vision changes, and any foot changes.     Self-Monitoring Blood Glucose/Continue Glucose Monitorin: 251  906 -244  21 -941am 216  21 -376 this morning (did not take insulin last night with food and indulged in asian noodles)  1056am - 276    Diet:  Eating hot pockets   Also had one night where he went and ate asian noodles with is not the norm for him  Feeling more overwhelmed with family stuff lately     Exercise:  Waiting until next vaccine to be able to go to the gym  Can't walk around for exercise because of back issues  Is hopeful that he can still do majority of stuff at the gym       Data reviewed:       Lab Results   Component Value Date/Time    Hemoglobin A1c 13.0 (H) 2021 11:58 AM    Hemoglobin A1c 8.3 (H) 2020 10:23 AM    Hemoglobin A1c 11.8 (H) 2020 10:33 AM    Hemoglobin A1c (POC) 6.8 2014 11:30 AM    Hemoglobin A1c (POC) 6.1 2012 10:02 AM    Hemoglobin A1c (POC) 5.9 10/28/2011 12:10 PM         Diabetes Health Maintenance:  Last eye exam: confirm with patient  Last foot exam: confirm with patient  Last influenza vaccine: 12/2018  Last Pneumovax 23 vaccine: 8/28/18  Last Prevnar-13 vaccine: not indicated  Hepatitis B Series: unknown  ASA Therapy: none  ACE/ARB Therapy: lisinopril 40mg  Statin Therapy: rosuvastatin 5mg      Assessment/Plan:     1. Diabetes:  a1c not at goal <7% per ADA and blood sugars are also out of  goal range of  fasting. Discussed alternate site testing using forearm. Advised patient to check sugars before eating and 2 hours after meal to see humalog dosing as it's unclear if sugars are rising after meals or already high before. Will continue other meds as prescribed. I will check into CGM with new company to see if they can help in anyway. Bydureon pen will no longer be made so will need to change to either BCISE if covered or a different GLP-1. Patient verbalized understanding of the information presented and all of the patients questions were answered. Advised information from visit would be available in [facility] for review if needed. Patient advised to call me or Dr. Yariel Briseno MD with any additional questions or concerns. Follow-up: 5/4/2021   Notification of recommendations will be sent to Dr. Yariel Briseno MD for review.       Esther Mchugh, PharmD, [facility], [facility] [address] in place: YES    Recommendation Provided To: Patient/Caregiver: 1 via Telephone   Time Spent (min): 30

## 2021-05-01 ENCOUNTER — IMMUNIZATION (OUTPATIENT)
Dept: INTERNAL MEDICINE CLINIC | Age: 46
End: 2021-05-01
Payer: MEDICAID

## 2021-05-01 DIAGNOSIS — Z23 ENCOUNTER FOR IMMUNIZATION: Primary | ICD-10-CM

## 2021-05-01 PROCEDURE — 91300 COVID-19, MRNA, LNP-S, PF, 30MCG/0.3ML DOSE(PFIZER): CPT | Performed by: FAMILY MEDICINE

## 2021-05-01 PROCEDURE — 0002A COVID-19, MRNA, LNP-S, PF, 30MCG/0.3ML DOSE(PFIZER): CPT | Performed by: FAMILY MEDICINE

## 2021-05-04 ENCOUNTER — TELEPHONE (OUTPATIENT)
Dept: INTERNAL MEDICINE CLINIC | Age: 46
End: 2021-05-04

## 2021-05-04 NOTE — TELEPHONE ENCOUNTER
----- Message from González Barragan sent at 5/4/2021  8:03 AM EDT -----  Regarding: [employer]/telephone  Contact: [phone]  General Message/Vendor Calls    Caller's first and last name: N/A      Reason for call: He is canceling his appt for today.       Callback required yes/no and why: No      Best contact number(s):[phone]      Details to clarify the request: N/A      González Barragan

## 2021-05-11 RX ORDER — EXENATIDE 2 MG/.85ML
1 INJECTION, SUSPENSION, EXTENDED RELEASE SUBCUTANEOUS
Qty: 4 SYRINGE | Refills: 2 | Status: SHIPPED | OUTPATIENT
Start: 2021-05-11 | End: 2021-06-30 | Stop reason: ALTCHOICE

## 2021-05-14 ENCOUNTER — TELEPHONE (OUTPATIENT)
Dept: INTERNAL MEDICINE CLINIC | Age: 46
End: 2021-05-14

## 2021-05-14 RX ORDER — INSULIN LISPRO 100 [IU]/ML
INJECTION, SOLUTION INTRAVENOUS; SUBCUTANEOUS
Qty: 90 ML | Refills: 3 | Status: SHIPPED | OUTPATIENT
Start: 2021-05-14 | End: 2021-07-07 | Stop reason: SDUPTHER

## 2021-05-14 NOTE — TELEPHONE ENCOUNTER
Patient called stating that he needed a refill on his Humalog and for the dosage to be adjusted since insurance saying it is too early to cover. Sent in updated prescription. Mini Faulkner, DominickD, BCPS, [address] in place:  Yes   Recommendation Provided To: Patient/Caregiver: 1 via Telephone   Intervention Detail: Refill(s) Provided   Total # of Interventions Recommended: 1   Total # of Interventions Accepted: 1   Intervention Accepted By: Patient/Caregiver: 1   Time Spent (min): 10

## 2021-06-04 ENCOUNTER — TELEPHONE (OUTPATIENT)
Dept: INTERNAL MEDICINE CLINIC | Age: 46
End: 2021-06-04

## 2021-06-04 NOTE — TELEPHONE ENCOUNTER
Called patient to set up a follow up appointment. Discussed with patient that we really need to get his blood sugars under control. Advised that we could use a sample of demetris to help get the data needed for checking blood sugars 4 times a day and then submit information to [facility]. Marcus scheduled to come in for sample sensors. Law Blank, PharmD, BCPS, [facility] 83 in place:  Yes   Recommendation Provided To: Patient/Caregiver: 1 via Telephone   Intervention Detail: Scheduled Appointment   Total # of Interventions Recommended: 1   Total # of Interventions Accepted: 1   Intervention Accepted By: Patient/Caregiver: 1   Time Spent (min): 15

## 2021-06-15 ENCOUNTER — OFFICE VISIT (OUTPATIENT)
Dept: INTERNAL MEDICINE CLINIC | Age: 46
End: 2021-06-15

## 2021-06-15 NOTE — PROGRESS NOTES
Pharmacist Visit for Diabetes Management & Education    S/O: Dung Salas is a 42 y.o. male referred by Dr. Ophelia Marcos MD for diabetes management. Patient is here for a follow-up on diabetes management and to have freestyle demetris applied. Patient has had issues checking blood sugars for years and this is our best chance at getting his blood sugars better controlled. Plan to have patient use samples to collect 4 times a day dosing and then will be able to submit for coverage. Patient already using basal/bolus therapy for a total of 4 injections per day and has a history of low blood sugars especially with activity. Current Diabetes Regimen:  Bydureon 2mg once weekly - BCISE  Glipizide ER 10mg once daily  Basaglar 50 units twice daily  --was skipping second dose when not eating - thought this one was related to meals  Humalog 40 units with meals - 1-2 times daily with meals  --takes with meals     ROS:   Patient denies hypoglycemic and hyperglycemic signs/symptoms, chest pain, shortness of breath, neuropathy, vision changes, and any foot changes.     Self-Monitoring Blood Glucose:  218 fasting this morning  311 evening  317 morning (after food)  271 fasting  363 after breakfast    Data reviewed:  Lab Results   Component Value Date/Time    Hemoglobin A1c 13.0 (H) 01/25/2021 11:58 AM    Hemoglobin A1c 8.3 (H) 04/29/2020 10:23 AM    Hemoglobin A1c 11.8 (H) 01/23/2020 10:33 AM    Hemoglobin A1c (POC) 6.8 02/03/2014 11:30 AM    Hemoglobin A1c (POC) 6.1 04/12/2012 10:02 AM    Hemoglobin A1c (POC) 5.9 10/28/2011 12:10 PM    Glucose 227 (H) 01/25/2021 11:58 AM    Microalbumin/Creat ratio (mg/g creat) 7 01/23/2020 10:33 AM    Microalb/Creat ratio (ug/mg creat.) 8 01/25/2021 11:58 AM    Microalbumin,urine random 1.34 01/23/2020 10:33 AM    LDL, calculated 108 (H) 01/25/2021 11:58 AM    LDL, calculated 100 (H) 04/29/2020 10:23 AM    Creatinine 0.82 01/25/2021 11:58 AM               Diabetes Health Maintenance:  Last eye exam: confirm with patient      Immunizations:  Immunization History   Administered Date(s) Administered    COVID-19, PFIZER, MRNA, LNP-S, PF, 30MCG/0.3ML DOSE 04/10/2021, 05/01/2021    Influenza High Dose Vaccine PF 09/09/2016    Influenza Vaccine 12/06/2017, 12/21/2018    Influenza Vaccine Split 10/28/2011    Pneumococcal Polysaccharide (PPSV-23) 08/28/2018    TD Vaccine 01/09/2012       Statin - ACEI/ARB - ASA  Key CAD CHF Meds             Cartia  mg capsule Take 1 capsule by mouth once daily    atenoloL (TENORMIN) 50 mg tablet Take 1 tablet by mouth once daily    spironolactone (ALDACTONE) 25 mg tablet Take 1 tablet by mouth once daily    rosuvastatin (CRESTOR) 5 mg tablet Take 1 tablet by mouth nightly    lisinopriL (PRINIVIL, ZESTRIL) 40 mg tablet Take 1 Tab by mouth daily. Take 1 tablet by mouth once daily    hydroCHLOROthiazide (HYDRODIURIL) 25 mg tablet Take 1 Tab by mouth daily. TAKE 1 TABLET BY MOUTH ONCE DAILY. *APPOINTMENT NEEDED            Assessment/Plan:     1. Diabetes:  a1c not at goal <7% per ADA guidelines (possibly adjusted to 8% given the history of higher blood sugars and challenges with getting to goal range and minimizing hypoglycemia). Patient is due to have a1c rechecked now - will recheck at next visit. Applied freestyle demetris sensor and reviewed with patient how to use and apply future sensors. He's familiar with product as his mother used to use this. Patient to continue current regimen of Basaglar 50 units twice daily, Humalog 10-40 units with meals depending on carb content and pre-meal blood sugar, Bydureon 2mg once weekly and Glipizied ER 10mg daily. Discussed taking Basaglar twice daily regardless of meal intake which patient had not been doing.  Advised moving forward the first to go would be his glipizide to decrease the unpredictability of low blood sugars that can be associated with that, especially as he gets back in the gym and is more active. Patient to follow up with me in 1 month to download information from sensor, recheck a1c and make any necessary adjustments. Patient verbalized understanding of the information presented and all of the patients questions were answered. AVS was handed to the patient and information reviewed. Patient advised to call me or Dr. Marquis Brook MD with any additional questions or concerns. Follow-up: 7/13/2021    Notification of recommendations will be sent to Dr. Marquis Brook MD for review. Zeinab Briscoe, PharmD, BCPS, [place] [address] in place:  Yes   Recommendation Provided To: Patient/Caregiver: 3 via In person   Intervention Detail: Device Training, Patient Access Assistance/Sample Provided and Scheduled Appointment   Total # of Interventions Recommended: 3   Total # of Interventions Accepted: 3   Intervention Accepted By: Patient/Caregiver: 3   Time Spent (min): 30

## 2021-06-15 NOTE — PATIENT INSTRUCTIONS
BASAGLAR - baseline or basal insulin- this is your long acting insulin that you take regardless of meals. You take 50 units twice daily, even if not eating.     HUMALOG - mealtime insulin - this one should only be taken when you are eating food - smaller snacks with carbs~ 10 units; normal meals ~30 units; higher carb meals ~40 units     BYDUREON - once weekly medication - take this no matter what and does not have to be taken with food    GLIPIZIDE ER 10mg - one tablet once daily in the morning WITH FOOD

## 2021-06-18 RX ORDER — BLOOD SUGAR DIAGNOSTIC
STRIP MISCELLANEOUS
Qty: 100 PEN NEEDLE | Refills: 0 | Status: SHIPPED | OUTPATIENT
Start: 2021-06-18 | End: 2021-07-17

## 2021-06-23 RX ORDER — INSULIN GLARGINE 100 [IU]/ML
50 INJECTION, SOLUTION SUBCUTANEOUS 2 TIMES DAILY
Qty: 10 PEN | Refills: 5 | Status: SHIPPED | OUTPATIENT
Start: 2021-06-23 | End: 2021-06-28 | Stop reason: SDUPTHER

## 2021-06-28 RX ORDER — INSULIN GLARGINE 100 [IU]/ML
50 INJECTION, SOLUTION SUBCUTANEOUS 2 TIMES DAILY
Qty: 10 PEN | Refills: 5 | Status: SHIPPED | OUTPATIENT
Start: 2021-06-28 | End: 2022-08-22

## 2021-06-30 ENCOUNTER — OFFICE VISIT (OUTPATIENT)
Dept: INTERNAL MEDICINE CLINIC | Age: 46
End: 2021-06-30
Payer: MEDICAID

## 2021-06-30 LAB — HBA1C MFR BLD HPLC: 11.7 %

## 2021-06-30 PROCEDURE — 83036 HEMOGLOBIN GLYCOSYLATED A1C: CPT

## 2021-06-30 RX ORDER — DULAGLUTIDE 0.75 MG/.5ML
0.75 INJECTION, SOLUTION SUBCUTANEOUS
COMMUNITY
End: 2021-08-03 | Stop reason: DRUGHIGH

## 2021-06-30 NOTE — PATIENT INSTRUCTIONS
1) STOP Glipizide    2) STOP Bydureon    3) START Jardiance 25mg once daily in the morning    4) START Trulicity 6.57LI this Friday - you will do this for 2 weeks. Then we'll talk and probably increase it. 5) Continue Basaglar 50 units twice daily and Humalog 40 units with meals - record these doses on the sensor    6) Continue scanning as much as possible.  Follow up 1 week via phone

## 2021-07-01 RX ORDER — MELOXICAM 7.5 MG/1
7.5 TABLET ORAL DAILY
Qty: 30 TABLET | Refills: 2 | Status: SHIPPED | OUTPATIENT
Start: 2021-07-01 | End: 2021-10-27

## 2021-07-01 RX ORDER — DULOXETIN HYDROCHLORIDE 60 MG/1
CAPSULE, DELAYED RELEASE ORAL
Qty: 30 CAPSULE | Refills: 2 | Status: SHIPPED | OUTPATIENT
Start: 2021-07-01 | End: 2021-12-01

## 2021-07-01 NOTE — PROGRESS NOTES
PharmD Visit for Diabetes Management:    S/O: Patient seen for a brief visit to review freestyle demetris data and to give another sensor sample. Patient has been checking blood sugars ~8+ times/day. Blood sugars are spiking after meals but unclear exactly when he's taking insulin. A1c rechecked today and has improved slightly from 13 to 11.7%. Patient really wants to be able to lose weight to hopefully help with his back issues and the pain he's experiencing. Diabetes Medications:  Basaglar 50 units twice daily  Humalog 40 units with meals  Bydureon BCISE 2mg once weekly  Glipizide ER 10mg once daily    Lab Results   Component Value Date/Time    Hemoglobin A1c 13.0 (H) 01/25/2021 11:58 AM    Hemoglobin A1c 8.3 (H) 04/29/2020 10:23 AM    Hemoglobin A1c 11.8 (H) 01/23/2020 10:33 AM    Hemoglobin A1c (POC) 11.7 06/30/2021 11:37 AM    Hemoglobin A1c (POC) 6.8 02/03/2014 11:30 AM    Hemoglobin A1c (POC) 6.1 04/12/2012 10:02 AM    Glucose 227 (H) 01/25/2021 11:58 AM    Microalbumin/Creat ratio (mg/g creat) 7 01/23/2020 10:33 AM    Microalb/Creat ratio (ug/mg creat.) 8 01/25/2021 11:58 AM    Microalbumin,urine random 1.34 01/23/2020 10:33 AM    LDL, calculated 108 (H) 01/25/2021 11:58 AM    LDL, calculated 100 (H) 04/29/2020 10:23 AM    Creatinine 0.82 01/25/2021 11:58 AM         A/P:  Diabetes: a1c not at goal <7% per ada guidelines and CGM info not at goal of >70% in target range. Had a long talk today about our goals of therapy being to change Bydureon to Trulicity so that we can hopefully get up to the max dose of Trulicity and back down on some of his insulin. Will add Jardiance 25mg once daily to patients regimen - he has been on this in the past and it's unclear why it was stopped. Hopefully this will help us to be able to decrease his insulin requirements. Since adding Jardiance and planning to titrate Trulicity, will STOP glipizide now.  Patient aware his sugars may increase some but that the hope is to be able to help with some weight loss which will then help with his sugars. Patient verbalized understanding and will follow up in 3 weeks to review information. Sample given of Trulicity 0.13CJ and Trulicity 3.7LM. Mini Faulkner, DominickD, BCPS, [facility] [address] in place:  Yes   Recommendation Provided To: Patient/Caregiver: 7 via In person   Intervention Detail: Device Training, Discontinued Rx: 2, reason: Cost/Formulary Change and Therapy De-escalation, Lab(s) Ordered, New Rx: 2, reason: Cost/Formulary Change and Needs Additional Therapy, Patient Access Assistance/Sample Provided and Scheduled Appointment   Total # of Interventions Recommended: 7   Total # of Interventions Accepted: 7   Intervention Accepted By: Patient/Caregiver: 7   Time Spent (min): 45

## 2021-07-01 NOTE — TELEPHONE ENCOUNTER
Patient requesting refills on Duloxetine and Meloxicam from PCP. These were previously filled by Dr. Arminda Gonzalez (pain doc) but he's no longer practicing. Will forward to PCP to see about refilling meds.

## 2021-07-06 DIAGNOSIS — I10 ESSENTIAL HYPERTENSION WITH GOAL BLOOD PRESSURE LESS THAN 130/80: ICD-10-CM

## 2021-07-06 RX ORDER — ROSUVASTATIN CALCIUM 5 MG/1
TABLET, COATED ORAL
Qty: 90 TABLET | Refills: 0 | Status: SHIPPED | OUTPATIENT
Start: 2021-07-06

## 2021-07-06 RX ORDER — SPIRONOLACTONE 25 MG/1
TABLET ORAL
Qty: 90 TABLET | Refills: 0 | Status: SHIPPED | OUTPATIENT
Start: 2021-07-06 | End: 2022-01-31

## 2021-07-06 RX ORDER — HYDROCHLOROTHIAZIDE 25 MG/1
TABLET ORAL
Qty: 90 TABLET | Refills: 0 | Status: SHIPPED | OUTPATIENT
Start: 2021-07-06 | End: 2021-12-28

## 2021-07-06 RX ORDER — LISINOPRIL 40 MG/1
TABLET ORAL
Qty: 90 TABLET | Refills: 0 | Status: SHIPPED | OUTPATIENT
Start: 2021-07-06 | End: 2021-09-02

## 2021-07-07 ENCOUNTER — VIRTUAL VISIT (OUTPATIENT)
Dept: INTERNAL MEDICINE CLINIC | Age: 46
End: 2021-07-07

## 2021-07-07 RX ORDER — INSULIN LISPRO 100 [IU]/ML
INJECTION, SOLUTION INTRAVENOUS; SUBCUTANEOUS
Qty: 15 PEN | Refills: 3 | Status: SHIPPED | OUTPATIENT
Start: 2021-07-07 | End: 2022-01-17

## 2021-07-07 NOTE — PROGRESS NOTES
Phone follow-up for diabetes management    S/O: Placed an outgoing call to patient (2 identifiers used) to follow-up on diabetes. This was a 1 week follow up to see how he was doing on new medicine - Jardiance - and stopping Glipizide. He has not been able to get the Jardiance yet so he has not stopped the Glipizide. Current Diabetes Regimen:  Trulicity 1.42SO once weekly - 2nd dose due this Friday  Basaglar 50 units twice daily  Humalog 30-40 units with meals, 3 times a day, depending on blood sugar reading  Glipizide ER 10mg once daily -- hasn't stopped yet     ROS: Patient denies hypoglycemic and hyperglycemic signs/symptoms, chest pain, shortness of breath, neuropathy, vision changes, and any foot changes. Blood Sugars:  Patient is using CGM and scanning at least 4 times a day to adjust Humalog. Most days checking more than 5 times per day. CGM data reviewed. Average 229  Lowest he's seen is 70's        Diet/Exercise:  Patient is watching food intake with regards to CGM/blood sugar management. Labs:  Lab Results   Component Value Date/Time    Hemoglobin A1c 13.0 (H) 01/25/2021 11:58 AM    Hemoglobin A1c 8.3 (H) 04/29/2020 10:23 AM    Hemoglobin A1c 11.8 (H) 01/23/2020 10:33 AM    Hemoglobin A1c (POC) 11.7 06/30/2021 11:37 AM    Hemoglobin A1c (POC) 6.8 02/03/2014 11:30 AM    Hemoglobin A1c (POC) 6.1 04/12/2012 10:02 AM    Glucose 227 (H) 01/25/2021 11:58 AM    Microalbumin/Creat ratio (mg/g creat) 7 01/23/2020 10:33 AM    Microalb/Creat ratio (ug/mg creat.) 8 01/25/2021 11:58 AM    Microalbumin,urine random 1.34 01/23/2020 10:33 AM    LDL, calculated 108 (H) 01/25/2021 11:58 AM    LDL, calculated 100 (H) 04/29/2020 10:23 AM    Creatinine 0.82 01/25/2021 11:58 AM       A/P:    1. Diabetes: a1c not at goal however patient is doing well with CGM and I'm hopeful we can continue to make progress with his medication adjustments.  Prior authorization approved for Jardiance so patient to pick that up and start it. Will touch base with him in ~2 weeks in the office to continue to adjust medications. Patient is currently using Freestyle Rasta sensors to monitor blood sugars and is scanning at least 4 times a day. Patient takes a total of 4insulin injections/day and is on a basal/bolus insulin regimen. Bolus doses require frequent adjustments depending on blood glucose value (patient to use more if blood sugar higher pre-meal or if eating higher carb meal; patient to use less if eating lower carb meal or if glucose lower prior to meal). Patient is evaluated in the office at least every 6 months with the last evaluation date being today, 7/7/21. Lab Results   Component Value Date/Time    Hemoglobin A1c 13.0 (H) 01/25/2021 11:58 AM    Hemoglobin A1c (POC) 11.7 06/30/2021 11:37 AM         Current Outpatient Medications:     insulin lispro (HUMALOG) 100 unit/mL kwikpen, INJECT UP TO 50 UNITS SUBCUTANEOUSLY BEFORE BREAKFAST, LUNCH, AND DINNER - 3 BOXES (1 month supply) - DOSE CHANGE 7/7/21, Disp: 15 Pen, Rfl: 3    lisinopriL (PRINIVIL, ZESTRIL) 40 mg tablet, Take 1 tablet by mouth once daily, Disp: 90 Tablet, Rfl: 0    spironolactone (ALDACTONE) 25 mg tablet, Take 1 tablet by mouth once daily, Disp: 90 Tablet, Rfl: 0    rosuvastatin (CRESTOR) 5 mg tablet, Take 1 tablet by mouth nightly, Disp: 90 Tablet, Rfl: 0    hydroCHLOROthiazide (HYDRODIURIL) 25 mg tablet, TAKE 1 TABLET BY MOUTH ONCE DAILY. *NEED TO MAKE AN APPOINTMENT, Disp: 90 Tablet, Rfl: 0    meloxicam (MOBIC) 7.5 mg tablet, Take 1 Tablet by mouth daily. , Disp: 30 Tablet, Rfl: 2    DULoxetine (CYMBALTA) 60 mg capsule, take 1 capsule by mouth once daily, Disp: 30 Capsule, Rfl: 2    empagliflozin (Jardiance) 25 mg tablet, Take 1 Tablet by mouth daily. , Disp: 30 Tablet, Rfl: 2    dulaglutide (Trulicity) 9.92 AO/3.2 mL sub-q pen, 0.75 mg by SubCUTAneous route every seven (7) days.  Sample given, Disp: , Rfl:     insulin glargine (Basaglar KwikPen U-100 Insulin) 100 unit/mL (3 mL) inpn, 50 Units by SubCUTAneous route two (2) times a day., Disp: 10 Pen, Rfl: 5    Insulin Needles, Disposable, (BD Ultra-Fine Micro Pen Needle) 32 gauge x 1/4\" ndle, USE 1 NEW PEN NEEDLE TO INJECT INSULIN 4 TIMES DAILY, Disp: 100 Pen Needle, Rfl: 0    Cartia  mg capsule, Take 1 capsule by mouth once daily, Disp: 30 Cap, Rfl: 2    atenoloL (TENORMIN) 50 mg tablet, Take 1 tablet by mouth once daily, Disp: 90 Tab, Rfl: 1    gabapentin (NEURONTIN) 600 mg tablet, Take 600 mg by mouth three (3) times daily. , Disp: , Rfl:     fexofenadine (ALLEGRA) 180 mg tablet, Take 180 mg by mouth., Disp: , Rfl:     Insulin Needles, Disposable, (BD Ultra-Fine Mini Pen Needle) 31 gauge x 3/16\" ndle, USE FOR INSULIN INJECTION ONCE DAILY. , Disp: 100 Pen Needle, Rfl: 5    ketorolac (TORADOL) 10 mg tablet, TAKE 1 TABLET BY MOUTH EVERY 6 HOURS AS NEEDED, Disp: 60 Tab, Rfl: 3    OneTouch Delica Plus Lancet 33 gauge misc, USE TO TEST BLOOD SUGAR THREE TIMES DAILY, Disp: 100 Lancet, Rfl: 1    albuterol (PROVENTIL HFA, VENTOLIN HFA, PROAIR HFA) 90 mcg/actuation inhaler, INHALE 1 PUFFS BY MOUTH EVERY 4 HOURS IF NEEDED FOR SHORTNESS OF BREATH, Disp: 18 g, Rfl: 4    omeprazole (PRILOSEC) 20 mg capsule, TAKE 1 CAPSULE BY MOUTH EVERY DAY, Disp: 30 Cap, Rfl: 5    OneTouch Ultra Blue Test Strip strip, USE TO TEST BLOOD GLUCOSE THREE TIMES DAILY, Disp: 100 Strip, Rfl: 0    lancets (ONETOUCH DELICA LANCETS) 30 gauge misc, USE TO TEST BLOOD SUGAR THREE TIMES DAILY, Disp: 100 Lancet, Rfl: 3    diclofenac (VOLTAREN) 1 % gel, APPLY 4 GRAMS TO AFFECTED AREA 5 TIMES A DAY IF NEEDED FOR ARTHRITIS, Disp: , Rfl:     polyethylene glycol (MIRALAX) 17 gram/dose powder, Take 17 g by mouth every other day., Disp: , Rfl:     clobetasol (TEMOVATE) 0.05 % ointment, Apply 1 Applicator to affected area daily. , Disp: , Rfl: 0    Blood-Glucose Meter monitoring kit, Pt needs One Touch Ultra glucometer to test blood sugars three times daily E11.9, Disp: 1 Kit, Rfl: 0    cyclobenzaprine (FLEXERIL) 10 mg tablet, Take  by mouth three (3) times daily as needed for Muscle Spasm(s). , Disp: , Rfl:     tramadol (ULTRAM) 50 mg tablet, Take 100 mg by mouth four (4) times daily. , Disp: , Rfl:        Patient verbalized understanding all information presented and advised to contact me with any additional questions or concerns. Follow up: 7/20/2021     Notification of recommendations will be sent to Dr. Bryant Kat for review.     Ruth Macias, PharmD, BCPS, CDCES

## 2021-07-08 ENCOUNTER — TELEPHONE (OUTPATIENT)
Dept: INTERNAL MEDICINE CLINIC | Age: 46
End: 2021-07-08

## 2021-07-08 NOTE — TELEPHONE ENCOUNTER
[address] called to state the insurance won't allow her to increase the insulin dosage as the doctor requested unless they get a prior authorization.

## 2021-07-09 NOTE — TELEPHONE ENCOUNTER
Called pharmacy. They cannot get an override to go through because insurance says patient should still have enough, despite new prescription with new directions. Unable to do PA because says we do not need one. Pharmacist at [facility] is going to give patient 1 box until this weekend and then prescription should go through. Advised patient. Mayra Recio, PharmD, [facility], [facility] [address] in place:  Yes   Recommendation Provided To: Patient/Caregiver: 1 via Telephone and Pharmacy: 1   Intervention Detail: Patient Access Assistance/Sample Provided   Total # of Interventions Recommended: 1   Total # of Interventions Accepted: 1   Intervention Accepted By: Patient/Caregiver: 1   Time Spent (min): 15

## 2021-07-13 ENCOUNTER — TELEPHONE (OUTPATIENT)
Dept: INTERNAL MEDICINE CLINIC | Age: 46
End: 2021-07-13

## 2021-07-13 NOTE — TELEPHONE ENCOUNTER
Patient called stating that he mixed up his meds. He took the Bydureon this week instead of Trulicity. Was overwhlemed as he had received news of his brother passing. Advised to just take the Trulicity next week when he is due for the next dose. Patient verbalized understanding. Lucy Elliott, DominickD, BCPS, [facility]:  Yes   Recommendation Provided To: Patient/Caregiver: 0 via Telephone   Time Spent (min): 15

## 2021-07-17 RX ORDER — BLOOD SUGAR DIAGNOSTIC
STRIP MISCELLANEOUS
Qty: 100 PEN NEEDLE | Refills: 1 | Status: SHIPPED | OUTPATIENT
Start: 2021-07-17 | End: 2021-09-13
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
## 2021-08-03 ENCOUNTER — OFFICE VISIT (OUTPATIENT)
Dept: INTERNAL MEDICINE CLINIC | Age: 46
End: 2021-08-03
Payer: COMMERCIAL

## 2021-08-03 ENCOUNTER — OFFICE VISIT (OUTPATIENT)
Dept: INTERNAL MEDICINE CLINIC | Age: 46
End: 2021-08-03

## 2021-08-03 VITALS
WEIGHT: 315 LBS | BODY MASS INDEX: 42.66 KG/M2 | SYSTOLIC BLOOD PRESSURE: 126 MMHG | TEMPERATURE: 99 F | RESPIRATION RATE: 18 BRPM | HEIGHT: 72 IN | HEART RATE: 93 BPM | OXYGEN SATURATION: 95 % | DIASTOLIC BLOOD PRESSURE: 73 MMHG

## 2021-08-03 DIAGNOSIS — E78.5 DYSLIPIDEMIA, GOAL LDL BELOW 100: ICD-10-CM

## 2021-08-03 DIAGNOSIS — G47.33 OSA (OBSTRUCTIVE SLEEP APNEA): ICD-10-CM

## 2021-08-03 DIAGNOSIS — F33.9 RECURRENT DEPRESSION (HCC): ICD-10-CM

## 2021-08-03 DIAGNOSIS — M54.42 CHRONIC BILATERAL LOW BACK PAIN WITH BILATERAL SCIATICA: ICD-10-CM

## 2021-08-03 DIAGNOSIS — M25.50 ARTHRALGIA, UNSPECIFIED JOINT: ICD-10-CM

## 2021-08-03 DIAGNOSIS — I10 ESSENTIAL HYPERTENSION WITH GOAL BLOOD PRESSURE LESS THAN 130/80: ICD-10-CM

## 2021-08-03 DIAGNOSIS — M54.41 CHRONIC BILATERAL LOW BACK PAIN WITH BILATERAL SCIATICA: ICD-10-CM

## 2021-08-03 DIAGNOSIS — G89.29 CHRONIC BILATERAL LOW BACK PAIN WITH BILATERAL SCIATICA: ICD-10-CM

## 2021-08-03 LAB — HBA1C MFR BLD HPLC: 10.3 %

## 2021-08-03 PROCEDURE — 83036 HEMOGLOBIN GLYCOSYLATED A1C: CPT | Performed by: INTERNAL MEDICINE

## 2021-08-03 PROCEDURE — 99214 OFFICE O/P EST MOD 30 MIN: CPT | Performed by: INTERNAL MEDICINE

## 2021-08-03 RX ORDER — DULAGLUTIDE 1.5 MG/.5ML
1.5 INJECTION, SOLUTION SUBCUTANEOUS
Qty: 4 SYRINGE | Refills: 0 | Status: SHIPPED | OUTPATIENT
Start: 2021-08-03 | End: 2021-08-18 | Stop reason: DRUGHIGH

## 2021-08-03 NOTE — PROGRESS NOTES
Pharmacist Visit for Diabetes Management & Education    S/O: Diego Black is a 42 y.o. male referred by Dr. Rehan Temple MD for diabetes management. Patient is here for follow up today after his appointment with his PCP. Current Diabetes Regimen:  Trulicity 2.1RW once weekly - has taken both doses of sample that was supplied  Basaglar 50 units twice daily  Humalog 30-40 units with meals, 3 times a day, depending on blood sugar reading  Jardiance 25mg once daily    ROS:   Patient has had 1 episode of hypoglycemia - gave too much fast acting insulin with meal. Patient denies hyperglycemic signs/symptoms, chest pain, shortness of breath, neuropathy, vision changes, and any foot changes.     Blood Glucose:  Patient is using freestyle demetris 2 CGM  Scans/Checks blood sugars at least 4 times a day to be able to adjust the dose insulin for meals (see chart below)        Diet:  Watching carbs, eating more vegetables    Exercise:  Hasn't been back to the gym much - working on getting back pain under control      Data reviewed:  Lab Results   Component Value Date/Time    Hemoglobin A1c 13.0 (H) 01/25/2021 11:58 AM    Hemoglobin A1c 8.3 (H) 04/29/2020 10:23 AM    Hemoglobin A1c 11.8 (H) 01/23/2020 10:33 AM    Hemoglobin A1c (POC) 10.3 08/03/2021 01:14 PM    Hemoglobin A1c (POC) 11.7 06/30/2021 11:37 AM    Hemoglobin A1c (POC) 6.8 02/03/2014 11:30 AM    Glucose 227 (H) 01/25/2021 11:58 AM    Microalbumin/Creat ratio (mg/g creat) 7 01/23/2020 10:33 AM    Microalb/Creat ratio (ug/mg creat.) 8 01/25/2021 11:58 AM    Microalbumin,urine random 1.34 01/23/2020 10:33 AM    LDL, calculated 108 (H) 01/25/2021 11:58 AM    LDL, calculated 100 (H) 04/29/2020 10:23 AM    Creatinine 0.82 01/25/2021 11:58 AM       Diabetes Health Maintenance:  Last eye exam: due now - was due in the fall  Immunizations:  Immunization History   Administered Date(s) Administered    COVID-19, PFIZER, MRNA, LNP-S, PF, 30MCG/0.3ML DOSE 04/10/2021, 05/01/2021    Influenza High Dose Vaccine PF 09/09/2016    Influenza Vaccine 12/06/2017, 12/21/2018    Influenza Vaccine Split 10/28/2011    Pneumococcal Polysaccharide (PPSV-23) 08/28/2018    TD Vaccine 01/09/2012       Statin - ACEI/ARB - ASA  Key CAD CHF Meds             lisinopriL (PRINIVIL, ZESTRIL) 40 mg tablet Take 1 tablet by mouth once daily    spironolactone (ALDACTONE) 25 mg tablet Take 1 tablet by mouth once daily    rosuvastatin (CRESTOR) 5 mg tablet Take 1 tablet by mouth nightly    hydroCHLOROthiazide (HYDRODIURIL) 25 mg tablet TAKE 1 TABLET BY MOUTH ONCE DAILY. *NEED TO MAKE AN APPOINTMENT    Cartia  mg capsule Take 1 capsule by mouth once daily    atenoloL (TENORMIN) 50 mg tablet Take 1 tablet by mouth once daily        Assessment/Plan:     1. Diabetes:  a1c not at goal <8% (adjusted due to patients complex regimen and risk of hypoglycemia). Patient to continue current doses of medications and to continue adjusting meal time insulin based on pre-meal blood sugars. Reiterated the importance of using less insulin with lower carbohydrate meals to prevent hypoglycemic episodes. Advised patient to use Avtal24 or similar zev to record all food intake so that we can discuss where changes may need to be made. Refill sent to pharmacy for Trulicity 4.7YI once weekly - only 1 month supply. Plan to follow closely and increase to 3mg if patient tolerates okay. Would like to increase GLP-1 and     Will send in order to [address] [place] [address] for CGM FreeMeiaojuyle Rasta 2. Patient aware they may contact him directly. Patient verbalized understanding of the information presented and all of the patients questions were answered. AVS was handed to the patient and information reviewed. Patient advised to call me or Dr. Rehan Temple MD with any additional questions or concerns.     Follow-up: 8/18/2021    Notification of recommendations will be sent to Dr. Rehan Temple MD for review. Pasha Olson, PharmD, BCPS, [facility] 83 in place:  Yes   Recommendation Provided To: Patient/Caregiver: 4 via In person   Intervention Detail: Lab(s) Ordered, Patient Access Assistance/Sample Provided, Refill(s) Provided and Scheduled Appointment   Intervention Accepted By: Patient/Caregiver: 4   Time Spent (min): 30

## 2021-08-04 ENCOUNTER — TELEPHONE (OUTPATIENT)
Dept: INTERNAL MEDICINE CLINIC | Age: 46
End: 2021-08-04

## 2021-08-04 DIAGNOSIS — G89.29 CHRONIC BILATERAL LOW BACK PAIN WITH BILATERAL SCIATICA: Primary | ICD-10-CM

## 2021-08-04 DIAGNOSIS — M54.41 CHRONIC BILATERAL LOW BACK PAIN WITH BILATERAL SCIATICA: Primary | ICD-10-CM

## 2021-08-04 DIAGNOSIS — M54.42 CHRONIC BILATERAL LOW BACK PAIN WITH BILATERAL SCIATICA: Primary | ICD-10-CM

## 2021-08-04 LAB
ALBUMIN SERPL-MCNC: 4.1 G/DL (ref 3.5–5)
ALBUMIN/GLOB SERPL: 1.1 {RATIO} (ref 1.1–2.2)
ALP SERPL-CCNC: 99 U/L (ref 45–117)
ALT SERPL-CCNC: 48 U/L (ref 12–78)
ANION GAP SERPL CALC-SCNC: 6 MMOL/L (ref 5–15)
AST SERPL-CCNC: 20 U/L (ref 15–37)
BILIRUB SERPL-MCNC: 0.3 MG/DL (ref 0.2–1)
BUN SERPL-MCNC: 18 MG/DL (ref 6–20)
BUN/CREAT SERPL: 16 (ref 12–20)
CALCIUM SERPL-MCNC: 9.8 MG/DL (ref 8.5–10.1)
CHLORIDE SERPL-SCNC: 103 MMOL/L (ref 97–108)
CHOLEST SERPL-MCNC: 202 MG/DL
CO2 SERPL-SCNC: 27 MMOL/L (ref 21–32)
CREAT SERPL-MCNC: 1.1 MG/DL (ref 0.7–1.3)
GLOBULIN SER CALC-MCNC: 3.9 G/DL (ref 2–4)
GLUCOSE SERPL-MCNC: 145 MG/DL (ref 65–100)
HDLC SERPL-MCNC: 53 MG/DL
HDLC SERPL: 3.8 {RATIO} (ref 0–5)
LDLC SERPL CALC-MCNC: 129.6 MG/DL (ref 0–100)
POTASSIUM SERPL-SCNC: 4.6 MMOL/L (ref 3.5–5.1)
PROT SERPL-MCNC: 8 G/DL (ref 6.4–8.2)
SODIUM SERPL-SCNC: 136 MMOL/L (ref 136–145)
TRIGL SERPL-MCNC: 97 MG/DL (ref ?–150)
TSH SERPL DL<=0.05 MIU/L-ACNC: 1.85 UIU/ML (ref 0.36–3.74)
URATE SERPL-MCNC: 5.7 MG/DL (ref 3.5–7.2)
VLDLC SERPL CALC-MCNC: 19.4 MG/DL

## 2021-08-04 NOTE — TELEPHONE ENCOUNTER
Patient is trying to see a pain management doctor he was referred to by Dr. Braeden Arias, named Dr. Kristi Cardenas at [address] [place] [address]. He needs a referral sent to [phone]. Patient will be coming into the office shortly to drop off a medical records release form so that his records can be sent there.

## 2021-08-04 NOTE — TELEPHONE ENCOUNTER
Patient came in and completed records request to have last 3 office notes sent to Dr. Farhana Chavez for pain management. I faxed the last 3 office notes from Dr. Victoria Cordoba as requested to Dr. Ivy Edwards at [phone] along with order referring to pain management.

## 2021-08-18 ENCOUNTER — VIRTUAL VISIT (OUTPATIENT)
Dept: INTERNAL MEDICINE CLINIC | Age: 46
End: 2021-08-18

## 2021-08-18 RX ORDER — DULAGLUTIDE 3 MG/.5ML
3 INJECTION, SOLUTION SUBCUTANEOUS
Qty: 4 PEN | Refills: 0 | Status: SHIPPED | OUTPATIENT
Start: 2021-08-18 | End: 2021-09-15 | Stop reason: SDUPTHER

## 2021-08-18 NOTE — PROGRESS NOTES
Pharmacist Visit for Diabetes Management & Education    S/O: Hector Hsu is a 42 y.o. male referred by Dr. Maida Mejias MD for diabetes management. 3 week follow up. Last visit we discussed a modified intermittent fasting to see if this helped with both blood sugars and weight loss. Stops eating at 8pm  Usually starts eating at 11am  Has some nausea every now and then but is doing okay    Current Diabetes Regimen:  Trulicity 6.9BY once weekly - takes on Sunday  Jardiance 25mg once daily  Basaglar 50 units twice daily  Humalog 40 units with most meals, 30 units with smaller meals - doses based on blood sugar readings    ROS:   Patient denies hypoglycemic and hyperglycemic signs/symptoms, chest pain, shortness of breath, neuropathy, vision changes, and any foot changes. Self-Monitoring Blood Glucose:  Freestyle Rasta 2 - reviewed numbers with patient    Time in Target  Last 7 days  Above 49%  In target 51%    Last 14 days  Above 39%  In Target 61%      Last 7 days - Average 193  Last 14 days - Average 180  Last 28 days - Average 179  Last 90 days - Average 200         Data reviewed:  Lab Results   Component Value Date/Time    Hemoglobin A1c 13.0 (H) 01/25/2021 11:58 AM    Hemoglobin A1c 8.3 (H) 04/29/2020 10:23 AM    Hemoglobin A1c 11.8 (H) 01/23/2020 10:33 AM    Hemoglobin A1c (POC) 10.3 08/03/2021 01:14 PM    Hemoglobin A1c (POC) 11.7 06/30/2021 11:37 AM    Hemoglobin A1c (POC) 6.8 02/03/2014 11:30 AM    Glucose 145 (H) 08/03/2021 02:01 PM    Microalbumin/Creat ratio (mg/g creat) 7 01/23/2020 10:33 AM    Microalb/Creat ratio (ug/mg creat.) 8 01/25/2021 11:58 AM    Microalbumin,urine random 1.34 01/23/2020 10:33 AM    LDL, calculated 129.6 (H) 08/03/2021 02:01 PM    Creatinine 1.10 08/03/2021 02:01 PM     Assessment/Plan:     1.   Diabetes:  a1c not at goal <8% (adjusted due to patients complex regimen and history of lower blood sugars) and CGM showing patients numbers are improving but are still not quite in goal range. Will increase Trulicity to 3mg and have patient take 2 of the 1.5mg on the same day for now and  the new prescription at the pharmacy. Advised patient to continue the intermittent fasting. Follow up in 2 weeks    Patient verbalized understanding of the information presented and all of the patients questions were answered. AVS was handed to the patient and information reviewed. Patient advised to call me or Dr. Mary Morel MD with any additional questions or concerns. Follow-up: 9/1/2021    Notification of recommendations will be sent to Dr. Mary Morel MD for review. Adi Moreno, PharmD, [facility], [facility] 83 in place:  Yes   Recommendation Provided To: Patient/Caregiver: 2 via Telephone   Intervention Detail: Dose Adjustment: 1, reason: Therapy Optimization and Scheduled Appointment   Intervention Accepted By: Patient/Caregiver: 2   Time Spent (min): 30

## 2021-09-01 ENCOUNTER — VIRTUAL VISIT (OUTPATIENT)
Dept: INTERNAL MEDICINE CLINIC | Age: 46
End: 2021-09-01

## 2021-09-01 NOTE — PROGRESS NOTES
Phone follow-up for diabetes management    S/O: Placed an outgoing call to patient (2 identifiers used) to follow-up on diabetes. Last spoke to patient on 8/18/21 where we increased Trulicity 3mg once weekly. He's had 2 doses so far. Notes that he's had 3 episodes of vomiting - seems to happen after any type of exercise or when he's coming inside from being out in the heat. Came up the steps at apt yesterday, felt super weak and then vomited. He had eaten that morning but this happened about 3pm.     Current Diabetes Regimen:  Trulicity 3mg once weekly - has taken 2 doses so far (Sundays)  Jardiance 25mg once daily  Basaglar 50 units twice daily  Humalog 40 units with most meals, 30 units with smaller meals - doses based on blood sugar readings - ran out of humalog last night    ROS: Patient denies hypoglycemic and hyperglycemic signs/symptoms, chest pain, shortness of breath, neuropathy, vision changes, and any foot changes. Blood Sugars:  Patient using CGM - freestyle demetris 2 with sensor  Still waiting to get    Time in [employer]  Last 7 days  Above 15%  In target 84%  Below 1%  -occurred between 6pm and 12am - looks like it went down to the 50's but was asymptomatic - did not do a finger stick to verify    Last 7 days - Average 144  Last 14 days - Average 144  Last 30 days - Average 166  Last 90 days - Average 195       Diet/Exercise:  No changes in the diet  Will eat sardines for breakfast and then around 6pm will feel like he's starving and will have chicken (baked without skin, breast) with vegetables  Will wake up at 3am starving and eat zulema sausage  Drinks lots of water all day with crystal light packets  Has not weighed himself lately.       Labs:  Lab Results   Component Value Date/Time    Hemoglobin A1c 13.0 (H) 01/25/2021 11:58 AM    Hemoglobin A1c 8.3 (H) 04/29/2020 10:23 AM    Hemoglobin A1c 11.8 (H) 01/23/2020 10:33 AM    Hemoglobin A1c (POC) 10.3 08/03/2021 01:14 PM    Hemoglobin A1c (POC) 11.7 06/30/2021 11:37 AM    Hemoglobin A1c (POC) 6.8 02/03/2014 11:30 AM    Glucose 145 (H) 08/03/2021 02:01 PM    Microalbumin/Creat ratio (mg/g creat) 7 01/23/2020 10:33 AM    Microalb/Creat ratio (ug/mg creat.) 8 01/25/2021 11:58 AM    Microalbumin,urine random 1.34 01/23/2020 10:33 AM    LDL, calculated 129.6 (H) 08/03/2021 02:01 PM    Creatinine 1.10 08/03/2021 02:01 PM           A/P:    1. Diabetes: a1c not at goal <8% (adjusted due to patients complex regimen and history of hypoglycemia) but is also not indicative of current regimen. We have added Jardiance and are titrating Trulicity to help with blood sugars. Patients CGM information shows continued improvement. Will have him continue the 3mg dose of Trulicity to see if the side effects improve as this appears to really be helping his blood sugars. Patient to also continue Basaglar 50 units twice dialy, Humalog with meals adjusted based on blood sugar readings and Jardiance 25mg daily. Patient notes that DME company still needs information from our office. Will resend now. 2. Refills: patient states he needs refills on both omeprazole and miralax. Will forward a refill request to PCP. Patient verbalized understanding all information presented and advised to contact me with any additional questions or concerns. Follow up: 9/15/2021      Notification of recommendations will be sent to Dr. Migue Gunderson for review. Austin Iniguez, PharmD, [facility], [address] [place] [address] in place:  Yes   Recommendation Provided To: Patient/Caregiver: 3 via Telephone   Intervention Detail: Patient Access Assistance/Sample Provided, Refill(s) Provided and Scheduled Appointment   Intervention Accepted By: Patient/Caregiver: 3   Time Spent (min): 30

## 2021-09-02 DIAGNOSIS — I10 ESSENTIAL HYPERTENSION WITH GOAL BLOOD PRESSURE LESS THAN 130/80: ICD-10-CM

## 2021-09-02 RX ORDER — LISINOPRIL 40 MG/1
TABLET ORAL
Qty: 90 TABLET | Refills: 0 | Status: SHIPPED | OUTPATIENT
Start: 2021-09-02 | End: 2022-05-11 | Stop reason: SDUPTHER

## 2021-09-04 RX ORDER — OMEPRAZOLE 20 MG/1
20 CAPSULE, DELAYED RELEASE ORAL DAILY
Qty: 30 CAPSULE | Refills: 3 | Status: SHIPPED | OUTPATIENT
Start: 2021-09-04 | End: 2022-07-19

## 2021-09-04 RX ORDER — POLYETHYLENE GLYCOL 3350 17 G/17G
17 POWDER, FOR SOLUTION ORAL EVERY OTHER DAY
Qty: 255 G | Refills: 3 | Status: SHIPPED | OUTPATIENT
Start: 2021-09-04 | End: 2021-09-15 | Stop reason: SDUPTHER

## 2021-09-04 NOTE — TELEPHONE ENCOUNTER
During visit, patient requested refills of Omeprazole and Miralax from PCP.  Will forward request for approval.    Myra Nelson, PharmD, BCPS, CDCES

## 2021-09-13 RX ORDER — BLOOD SUGAR DIAGNOSTIC
STRIP MISCELLANEOUS
Qty: 100 PEN NEEDLE | Refills: 3 | Status: SHIPPED | OUTPATIENT
Start: 2021-09-13 | End: 2022-03-31

## 2021-09-15 ENCOUNTER — VIRTUAL VISIT (OUTPATIENT)
Dept: INTERNAL MEDICINE CLINIC | Age: 46
End: 2021-09-15

## 2021-09-15 RX ORDER — DULAGLUTIDE 3 MG/.5ML
3 INJECTION, SOLUTION SUBCUTANEOUS
Qty: 4 EACH | Refills: 0 | Status: SHIPPED | OUTPATIENT
Start: 2021-09-15 | End: 2021-10-28

## 2021-09-15 RX ORDER — POLYETHYLENE GLYCOL 3350 17 G/17G
17 POWDER, FOR SOLUTION ORAL 2 TIMES DAILY
Qty: 1020 G | Refills: 3 | Status: SHIPPED | OUTPATIENT
Start: 2021-09-15 | End: 2022-10-01

## 2021-09-15 NOTE — TELEPHONE ENCOUNTER
Patient states Dr. Geronimo Zarate had him on Miralax twice daily - will update script and send to PCP for authorization. Yeny Vega, PharmD, [facility], [facility] in place:  Yes   Recommendation Provided To: Patient/Caregiver: 1 via Telephone   Intervention Detail: Refill(s) Provided   Intervention Accepted By: Patient/Caregiver: 1   Time Spent (min): 5

## 2021-09-15 NOTE — PROGRESS NOTES
Patient was seen with PharmD 2022 candidate Isaura Thompson. I was present for the visit and agree with the assessment and plan. Please see her note for more details of the visit.     -consider increasing trulicity if tolerated  -check on sensors    Lenora Salcedo PharmD, [facility]

## 2021-09-15 NOTE — PROGRESS NOTES
Pharmacist Visit for Diabetes Management & Education    S/O: Lindley Siemens is a 42 y.o. male referred by Dr. Selene Segundo MD for diabetes management. He was last seen on 9/1/21 where he noted nausea with Trulicity 3mg once weekly. He hasn't had nausea since then. He started eating at 2pm to keep something in his stomach to decrease the nausea. He has not received his CGM sensor yet and the company said that they are waiting for a PA from his insurance company. The last time that he scanned his sensor was on Friday and he has not been able to do finger sticks. Current Diabetes Regimen:  Trulicity 3mg once weekly   Jardiance 25mg once daily  Basaglar 50 units twice daily  Humalog 40 units with most meals, 30 units with smaller meals. ROS:   Patient denies hypoglycemic and hyperglycemic signs/symptoms, chest pain, shortness of breath, neuropathy, vision changes, and any foot changes.     Self-Monitoring Blood Glucose:  Time in Target  Last 7 days  Above 38%  In target 62  Below 0%     Last 14  In 63%  Above 37  Below 0     Last 30  69 in target  31% above     Last 90 days  52 above  In target 48%        Last 7 days - Average 174  Last 14 days - Average 173  Last 30 days - Average 165  Last 90 days - Average 191      Data reviewed:  Lab Results   Component Value Date/Time    Hemoglobin A1c 13.0 (H) 01/25/2021 11:58 AM    Hemoglobin A1c 8.3 (H) 04/29/2020 10:23 AM    Hemoglobin A1c 11.8 (H) 01/23/2020 10:33 AM    Hemoglobin A1c (POC) 10.3 08/03/2021 01:14 PM    Hemoglobin A1c (POC) 11.7 06/30/2021 11:37 AM    Hemoglobin A1c (POC) 6.8 02/03/2014 11:30 AM    Glucose 145 (H) 08/03/2021 02:01 PM    Microalbumin/Creat ratio (mg/g creat) 7 01/23/2020 10:33 AM    Microalb/Creat ratio (ug/mg creat.) 8 01/25/2021 11:58 AM    Microalbumin,urine random 1.34 01/23/2020 10:33 AM    LDL, calculated 129.6 (H) 08/03/2021 02:01 PM    Creatinine 1.10 08/03/2021 02:01 PM       Immunizations:  Immunization History Administered Date(s) Administered    COVID-19, PFIZER, MRNA, LNP-S, PF, 30MCG/0.3ML DOSE 04/10/2021, 05/01/2021    Influenza High Dose Vaccine PF 09/09/2016    Influenza Vaccine 12/06/2017, 12/21/2018    Influenza Vaccine Split 10/28/2011    Pneumococcal Polysaccharide (PPSV-23) 08/28/2018    TD Vaccine 01/09/2012       Statin - ACEI/ARB - ASA  Key CAD CHF Meds             lisinopriL (PRINIVIL, ZESTRIL) 40 mg tablet Take 1 tablet by mouth once daily    spironolactone (ALDACTONE) 25 mg tablet Take 1 tablet by mouth once daily    rosuvastatin (CRESTOR) 5 mg tablet Take 1 tablet by mouth nightly    hydroCHLOROthiazide (HYDRODIURIL) 25 mg tablet TAKE 1 TABLET BY MOUTH ONCE DAILY. *NEED TO MAKE AN APPOINTMENT    Cartia  mg capsule Take 1 capsule by mouth once daily    atenoloL (TENORMIN) 50 mg tablet Take 1 tablet by mouth once daily            Assessment/Plan:     1. Diabetes:    A1C is not at goal of <8% (adjusted due to patients complex regimen and history of hypoglycemia) but it is not indicative of current regimen. The patients CGM data is showing continued improvement but is not at goal time in range >70%. Continue Basaglar 50 units twice daily, Humalog 40 units with meals or 30 units with smaller meals, Jardiance 25 mg and Trulicity 3 mg. Sent in a one month refill of Trulicity 3mg to the pharmacy and will consider increasing the dose next month based on CGM data. Advised patient to call us once the CGM comes in to schedule a follow up appointment. 2. Refills: patient states he needs refills on both omeprazole and miralax. Will forward a refill request to PCP. Patient notes that he sometimes takes 2 capsules of omeprazole once daily as needed instead of 1 capsule. Advised the patient to take Omeprazole 20 mg 1 capsule once daily and take OTC pepcid as needed. Patient verbalized understanding of the information presented and all of the patients questions were answered.   AVS was handed to the patient and information reviewed. Patient advised to call me or Dr. Carlo Newsome MD with any additional questions or concerns. Follow-up: Visit date not found   Notification of recommendations will be sent to Dr. Carlo Newsome MD for review. Marielos Silvestre PharmD 2022    For Pharmacy [id number] in place:  Yes   Recommendation Provided To: Patient/Caregiver: 1 via Telephone   Intervention Detail: Refill(s) Provided   Intervention Accepted By: Patient/Caregiver: 1   Time Spent (min): 20

## 2021-10-27 RX ORDER — MELOXICAM 7.5 MG/1
TABLET ORAL
Qty: 30 TABLET | Refills: 0 | Status: SHIPPED | OUTPATIENT
Start: 2021-10-27 | End: 2022-01-31

## 2021-11-07 RX ORDER — DILTIAZEM HYDROCHLORIDE 240 MG/1
CAPSULE, COATED, EXTENDED RELEASE ORAL
Qty: 90 CAPSULE | Refills: 0 | Status: SHIPPED | OUTPATIENT
Start: 2021-11-07 | End: 2022-05-11 | Stop reason: SDUPTHER

## 2021-12-28 DIAGNOSIS — I10 ESSENTIAL HYPERTENSION: ICD-10-CM

## 2021-12-28 DIAGNOSIS — I10 ESSENTIAL HYPERTENSION WITH GOAL BLOOD PRESSURE LESS THAN 130/80: ICD-10-CM

## 2021-12-28 RX ORDER — HYDROCHLOROTHIAZIDE 25 MG/1
TABLET ORAL
Qty: 90 TABLET | Refills: 0 | Status: SHIPPED | OUTPATIENT
Start: 2021-12-28 | End: 2022-01-31

## 2021-12-28 RX ORDER — ATENOLOL 50 MG/1
TABLET ORAL
Qty: 90 TABLET | Refills: 0 | Status: SHIPPED | OUTPATIENT
Start: 2021-12-28 | End: 2022-01-31

## 2022-01-17 RX ORDER — INSULIN LISPRO 100 [IU]/ML
INJECTION, SOLUTION INTRAVENOUS; SUBCUTANEOUS
Qty: 45 ML | Refills: 0 | Status: SHIPPED | OUTPATIENT
Start: 2022-01-17 | End: 2022-03-31

## 2022-01-31 DIAGNOSIS — I10 ESSENTIAL HYPERTENSION: ICD-10-CM

## 2022-01-31 DIAGNOSIS — I10 ESSENTIAL HYPERTENSION WITH GOAL BLOOD PRESSURE LESS THAN 130/80: ICD-10-CM

## 2022-01-31 RX ORDER — HYDROCHLOROTHIAZIDE 25 MG/1
TABLET ORAL
Qty: 90 TABLET | Refills: 0 | Status: SHIPPED | OUTPATIENT
Start: 2022-01-31 | End: 2022-05-11 | Stop reason: SDUPTHER

## 2022-01-31 RX ORDER — MELOXICAM 7.5 MG/1
TABLET ORAL
Qty: 30 TABLET | Refills: 0 | Status: SHIPPED | OUTPATIENT
Start: 2022-01-31 | End: 2022-05-11 | Stop reason: ALTCHOICE

## 2022-01-31 RX ORDER — SPIRONOLACTONE 25 MG/1
TABLET ORAL
Qty: 90 TABLET | Refills: 0 | Status: SHIPPED | OUTPATIENT
Start: 2022-01-31 | End: 2022-07-19

## 2022-01-31 RX ORDER — ATENOLOL 50 MG/1
TABLET ORAL
Qty: 90 TABLET | Refills: 0 | Status: SHIPPED | OUTPATIENT
Start: 2022-01-31 | End: 2022-05-11 | Stop reason: SDUPTHER

## 2022-03-18 PROBLEM — J11.1 ACUTE PHARYNGITIS WITH INFLUENZA: Status: ACTIVE | Noted: 2020-04-28

## 2022-03-18 PROBLEM — R50.9 ACUTE FEBRILE ILLNESS: Status: ACTIVE | Noted: 2020-04-28

## 2022-03-19 PROBLEM — G56.00 CARPAL TUNNEL SYNDROME: Status: ACTIVE | Noted: 2020-04-28

## 2022-03-19 PROBLEM — F33.9 RECURRENT DEPRESSION (HCC): Status: ACTIVE | Noted: 2017-12-21

## 2022-03-31 RX ORDER — BLOOD SUGAR DIAGNOSTIC
STRIP MISCELLANEOUS
Qty: 100 PEN NEEDLE | Refills: 0 | Status: SHIPPED | OUTPATIENT
Start: 2022-03-31 | End: 2022-07-19

## 2022-03-31 RX ORDER — INSULIN LISPRO 100 [IU]/ML
INJECTION, SOLUTION INTRAVENOUS; SUBCUTANEOUS
Qty: 45 ML | Refills: 0 | Status: SHIPPED | OUTPATIENT
Start: 2022-03-31 | End: 2022-06-19

## 2022-04-13 ENCOUNTER — NURSE TRIAGE (OUTPATIENT)
Dept: OTHER | Facility: CLINIC | Age: 47
End: 2022-04-13

## 2022-04-13 ENCOUNTER — APPOINTMENT (OUTPATIENT)
Dept: GENERAL RADIOLOGY | Age: 47
End: 2022-04-13
Attending: EMERGENCY MEDICINE
Payer: MEDICAID

## 2022-04-13 ENCOUNTER — TELEPHONE (OUTPATIENT)
Dept: INTERNAL MEDICINE CLINIC | Age: 47
End: 2022-04-13

## 2022-04-13 ENCOUNTER — HOSPITAL ENCOUNTER (EMERGENCY)
Age: 47
Discharge: HOME | End: 2022-04-13
Attending: EMERGENCY MEDICINE
Payer: MEDICAID

## 2022-04-13 ENCOUNTER — APPOINTMENT (OUTPATIENT)
Dept: CT IMAGING | Age: 47
End: 2022-04-13
Attending: EMERGENCY MEDICINE
Payer: MEDICAID

## 2022-04-13 VITALS
TEMPERATURE: 98.7 F | WEIGHT: 315 LBS | OXYGEN SATURATION: 95 % | HEIGHT: 72 IN | SYSTOLIC BLOOD PRESSURE: 156 MMHG | DIASTOLIC BLOOD PRESSURE: 84 MMHG | HEART RATE: 87 BPM | BODY MASS INDEX: 42.66 KG/M2

## 2022-04-13 DIAGNOSIS — R00.2 PALPITATIONS: ICD-10-CM

## 2022-04-13 DIAGNOSIS — R51.9 ACUTE NONINTRACTABLE HEADACHE, UNSPECIFIED HEADACHE TYPE: Primary | ICD-10-CM

## 2022-04-13 DIAGNOSIS — R06.02 SOB (SHORTNESS OF BREATH): ICD-10-CM

## 2022-04-13 LAB
ALBUMIN SERPL-MCNC: 3.8 G/DL (ref 3.5–5)
ALBUMIN/GLOB SERPL: 0.9 {RATIO} (ref 1.1–2.2)
ALP SERPL-CCNC: 103 U/L (ref 45–117)
ALT SERPL-CCNC: 48 U/L (ref 12–78)
ANION GAP SERPL CALC-SCNC: 9 MMOL/L (ref 5–15)
AST SERPL-CCNC: 25 U/L (ref 15–37)
BASOPHILS # BLD: 0.1 K/UL (ref 0–0.1)
BASOPHILS NFR BLD: 1 % (ref 0–1)
BILIRUB SERPL-MCNC: 0.6 MG/DL (ref 0.2–1)
BUN SERPL-MCNC: 10 MG/DL (ref 6–20)
BUN/CREAT SERPL: 9 (ref 12–20)
CALCIUM SERPL-MCNC: 9.7 MG/DL (ref 8.5–10.1)
CHLORIDE SERPL-SCNC: 99 MMOL/L (ref 97–108)
CO2 SERPL-SCNC: 28 MMOL/L (ref 21–32)
CREAT SERPL-MCNC: 1.15 MG/DL (ref 0.7–1.3)
DIFFERENTIAL METHOD BLD: ABNORMAL
EOSINOPHIL # BLD: 0.2 K/UL (ref 0–0.4)
EOSINOPHIL NFR BLD: 2 % (ref 0–7)
ERYTHROCYTE [DISTWIDTH] IN BLOOD BY AUTOMATED COUNT: 16.7 % (ref 11.5–14.5)
GLOBULIN SER CALC-MCNC: 4.1 G/DL (ref 2–4)
GLUCOSE SERPL-MCNC: 255 MG/DL (ref 65–100)
HCT VFR BLD AUTO: 46.9 % (ref 36.6–50.3)
HGB BLD-MCNC: 15.1 G/DL (ref 12.1–17)
IMM GRANULOCYTES # BLD AUTO: 0 K/UL (ref 0–0.04)
IMM GRANULOCYTES NFR BLD AUTO: 0 % (ref 0–0.5)
LYMPHOCYTES # BLD: 2.4 K/UL (ref 0.8–3.5)
LYMPHOCYTES NFR BLD: 23 % (ref 12–49)
MAGNESIUM SERPL-MCNC: 2.4 MG/DL (ref 1.6–2.4)
MCH RBC QN AUTO: 25.7 PG (ref 26–34)
MCHC RBC AUTO-ENTMCNC: 32.2 G/DL (ref 30–36.5)
MCV RBC AUTO: 79.8 FL (ref 80–99)
MONOCYTES # BLD: 0.8 K/UL (ref 0–1)
MONOCYTES NFR BLD: 8 % (ref 5–13)
NEUTS SEG # BLD: 6.9 K/UL (ref 1.8–8)
NEUTS SEG NFR BLD: 66 % (ref 32–75)
NRBC # BLD: 0 K/UL (ref 0–0.01)
NRBC BLD-RTO: 0 PER 100 WBC
PLATELET # BLD AUTO: 299 K/UL (ref 150–400)
PMV BLD AUTO: 10.4 FL (ref 8.9–12.9)
POTASSIUM SERPL-SCNC: 3.7 MMOL/L (ref 3.5–5.1)
PROT SERPL-MCNC: 7.9 G/DL (ref 6.4–8.2)
RBC # BLD AUTO: 5.88 M/UL (ref 4.1–5.7)
SODIUM SERPL-SCNC: 136 MMOL/L (ref 136–145)
TROPONIN-HIGH SENSITIVITY: 10 NG/L (ref 0–76)
WBC # BLD AUTO: 10.4 K/UL (ref 4.1–11.1)

## 2022-04-13 PROCEDURE — 80053 COMPREHEN METABOLIC PANEL: CPT

## 2022-04-13 PROCEDURE — 83735 ASSAY OF MAGNESIUM: CPT

## 2022-04-13 PROCEDURE — 71046 X-RAY EXAM CHEST 2 VIEWS: CPT

## 2022-04-13 PROCEDURE — 93005 ELECTROCARDIOGRAM TRACING: CPT

## 2022-04-13 PROCEDURE — 70450 CT HEAD/BRAIN W/O DYE: CPT

## 2022-04-13 PROCEDURE — 36415 COLL VENOUS BLD VENIPUNCTURE: CPT

## 2022-04-13 PROCEDURE — 84484 ASSAY OF TROPONIN QUANT: CPT

## 2022-04-13 PROCEDURE — 99284 EMERGENCY DEPT VISIT MOD MDM: CPT

## 2022-04-13 PROCEDURE — 85025 COMPLETE CBC W/AUTO DIFF WBC: CPT

## 2022-04-13 RX ORDER — BUTALBITAL, ACETAMINOPHEN AND CAFFEINE 300; 40; 50 MG/1; MG/1; MG/1
1 CAPSULE ORAL
Qty: 12 CAPSULE | Refills: 0 | Status: SHIPPED | OUTPATIENT
Start: 2022-04-13

## 2022-04-13 NOTE — ED PROVIDER NOTES
Mr. Zelalem Rodriguez is a 41yo male who presents with complaints of headache and palpitations. He states that his headache has been present every day for the last year and a half, or even longer. His pain is located in the top part of his head. He also has a history of hypertension. He has been not taking his medications regularly for the last number of months. He did take it for the last 2 days, however, he been taking it sporadically prior to that. He also reports palpitations. The palpitations have been happening regularly for the last year and a half as well. They last several minutes at a time. He describes sometimes she has leg swelling, however, he is not having leg swelling currently. He denies any chest pain. He symptoms feel short of breath when he exerts himself. However, he does not feel short of breath other times when he exerts himself. The symptoms have been also present for the last year and a half. He denies any pain in his abdomen. No changes with his urine or bowel movements. He denies any other complaints.            Past Medical History:   Diagnosis Date    Arthritis     Asthma     Chronic back pain 04/11/2011    Dr. Nnamdi Carbajal - pain management    Depression     Diabetes ([address]     Dyslipidemia     HTN (hypertension)     Morbid obesity ([address]     JENNIFER (obstructive sleep apnea) 4/12/2012    Psychotic disorder ([address]        Past Surgical History:   Procedure Laterality Date    HX ORTHOPAEDIC Right 2019    hand surgery    IR INJ EPIDURAL LUMBAR SACRAL           Family History:   Problem Relation Age of Onset    Cancer Mother     Heart Disease Mother     OSTEOARTHRITIS Mother     Diabetes Mother     Coronary Art Dis Mother     Hypertension Father     Stroke Father        Social History     Socioeconomic History    Marital status: SINGLE     Spouse name: Not on file    Number of children: Not on file    Years of education: Not on file    Highest education level: Not on file Occupational History    Not on file   Tobacco Use    Smoking status: Never Smoker    Smokeless tobacco: Never Used   Substance and Sexual Activity    Alcohol use: No    Drug use: No    Sexual activity: Not Currently     Partners: Female   Other Topics Concern    Not on file   Social History Narrative    Not on file     Social Determinants of Health     Financial Resource Strain:     Difficulty of Paying Living Expenses: Not on file   Food Insecurity:     Worried About Running Out of Food in the Last Year: Not on file    Jacki of Food in the Last Year: Not on file   Transportation Needs:     Lack of Transportation (Medical): Not on file    Lack of Transportation (Non-Medical): Not on file   Physical Activity:     Days of Exercise per Week: Not on file    Minutes of Exercise per Session: Not on file   Stress:     Feeling of Stress : Not on file   Social Connections:     Frequency of Communication with Friends and Family: Not on file    Frequency of Social Gatherings with Friends and Family: Not on file    Attends Rastafarian Services: Not on file    Active Member of [address] [place] [address] or Organizations: Not on file    Attends Club or Organization Meetings: Not on file    Marital Status: Not on file   Intimate Partner Violence:     Fear of Current or Ex-Partner: Not on file    Emotionally Abused: Not on file    Physically Abused: Not on file    Sexually Abused: Not on file   Housing Stability:     Unable to Pay for Housing in the Last Year: Not on file    Number of [place] in the Last Year: Not on file    Unstable Housing in the Last Year: Not on file         ALLERGIES: Latex, Clindamycin-benzoyl peroxide, Guanfacine, and Metformin    Review of Systems   Constitutional: Negative for chills and fever. HENT: Negative for rhinorrhea and sore throat. Respiratory: Positive for shortness of breath. Negative for cough. Cardiovascular: Positive for palpitations. Negative for chest pain. Gastrointestinal: Negative for abdominal pain, diarrhea, nausea and vomiting. Genitourinary: Negative for dysuria and hematuria. Musculoskeletal: Negative for arthralgias and myalgias. Skin: Negative for pallor and rash. Neurological: Positive for headaches. Negative for dizziness, weakness and light-headedness. All other systems reviewed and are negative. Vitals:    04/13/22 1309   BP: (!) 156/84   Pulse: 87   Temp: 98.7 °F (37.1 °C)   SpO2: 95%   Weight: (!) 190.5 kg (420 lb)   Height: 6' (1.829 m)            Physical Exam     Vital signs reviewed. Nursing notes reviewed. Const:  No acute distress, well developed, well nourished  Head:  Atraumatic, normocephalic  Eyes:  PERRL, conjunctiva normal, no scleral icterus  Neck:  Supple, trachea midline  Cardiovascular: Regular rate  Resp:  No resp distress, no increased work of breathing  Abd:  Soft, non-tender, non-distended  MSK:  No pedal edema, normal ROM  Neuro:  Alert and oriented x3, no cranial nerve defect  Skin:  Warm, dry, intact  Psych: normal mood and affect, behavior is normal, judgement and thought content is normal          MDM  Number of Diagnoses or Management Options     Amount and/or Complexity of Data Reviewed  Clinical lab tests: ordered and reviewed  Tests in the radiology section of CPT®: ordered and reviewed  Review and summarize past medical records: yes    Patient Progress  Patient progress: stable          Mr. Malathi Loera is a 41yo male who presents to the ER with complaints of headache, palpitations, shortness of breath, among other things. His symptoms have been present for the last year and a half. No arrhythmias on EKG. No pulmonary edema. He is well-appearing in the ER. No mass or bleeding head CT. He is not happy with his primary care doctor. Have given information for a new primary care doctor as well as follow-up with cardiology. He was started take his blood pressure medicine regularly and as prescribed.   He is to follow-up with his physicians return to the ER with any new or worsening symptoms.       Procedures

## 2022-04-13 NOTE — TELEPHONE ENCOUNTER
----- Message from Justa Nares sent at 4/13/2022 10:15 AM EDT -----  Subject: Message to Provider    QUESTIONS  Information for Provider? Pt Estella Jeans, is requesting to leave practice,   would like to be seen at [address] with Yeny Thomas. Pt was nt with   Ingrid for high blood pressure, palpitations, and headaches. Nurse Ingrid   recommended ER. Pt declines at this time. Pt requesting a call back. ---------------------------------------------------------------------------  --------------  Portia Salas INFO  What is the best way for the office to contact you? OK to leave message on   voicemail  Preferred Call Back Phone Number? [phone]  ---------------------------------------------------------------------------  --------------  SCRIPT ANSWERS  Relationship to Patient?  Self

## 2022-04-13 NOTE — TELEPHONE ENCOUNTER
Received call from fabian at [facility] with Red Flag Complaint. Subjective: Caller states Prosper De Jesus been having headaches since mom passed 2 years ago, even before that. Recently checked BP so painful pain goes down abck of neck keep having heart flutters. This migraine is next level. 225/115 this morning 199/108. That's with me taking all BP medications. \"     Current Symptoms: pt reports BP this morning was 199/108, took it twice before that 196/116. Headaches comes and goes, sometimes so bad patient is dizzy . Pt denies chest pains, dizziness, difficulty walking or talking, blurry , double vision  right now. Pt reports shortness of breath with activity , intermittent 2 years. Pt did report blurry vision two days ago. Pt reports palpitations     Onset: 4 baird ago, constant     Associated Symptoms: NA    Pain Severity: headache  8/10; aching; constant- pt denies worst headache of life. Temperature: no fever     What has been tried: APAP     LMP: NA Pregnant: NA    Recommended disposition: Go to ED/UCC Now (Or to Office with PCP Approval) this writer attempted to reach the office unable to , connected with ECC agent , 7 times unable to reach. Pt advised unable to reach office this writer advised ED or UC now . Pt is unsure if he will go . Did review risks involved with delay in care including up to death. Pt verbalized understanding. Requesting to be sent to [facility] for appt      Care advice provided, patient verbalizes understanding; denies any other questions or concerns; instructed to call back for any new or worsening symptoms. Patient/Caller does not agree with recommended disposition; writer provided warm transfer to [facility]  at [facility] for appointment scheduling     Attention Provider: Thank you for allowing me to participate in the care of your patient. The patient was connected to triage in response to information provided to the [facility].   Please do not respond through this encounter as the response is not directed to a shared pool.       Reason for Disposition   Patient sounds very sick or weak to the triager     Patient reports palpitations today    Protocols used: BLOOD PRESSURE - HIGH-ADULT-OH

## 2022-04-13 NOTE — TELEPHONE ENCOUNTER
Attempted to call patient to provide in office appointment today with Dr. Meet Swenson, however he did not answer and his v/m box was full so nurse could not leave a message.

## 2022-04-13 NOTE — ED TRIAGE NOTES
Pt arrives to the ED with c/o of high blood pressure and palpitations. States that the palpitations started about a year and a half ago after his mother passed away. Pt has experienced some nausea and diarrhea but denies vomiting. Pt currently has a headache and states that the pain starts at the top of his head and moves towards the back of his neck.

## 2022-04-16 LAB
ATRIAL RATE: 85 BPM
CALCULATED P AXIS, ECG09: 35 DEGREES
CALCULATED R AXIS, ECG10: 28 DEGREES
CALCULATED T AXIS, ECG11: 31 DEGREES
DIAGNOSIS, 93000: NORMAL
P-R INTERVAL, ECG05: 220 MS
Q-T INTERVAL, ECG07: 364 MS
QRS DURATION, ECG06: 66 MS
QTC CALCULATION (BEZET), ECG08: 433 MS
VENTRICULAR RATE, ECG03: 85 BPM

## 2022-05-10 NOTE — PROGRESS NOTES
Shellie Santillan (: 1975) is a 42 y.o. male, established patient, here for evaluation of the following chief complaint(s):  ED Follow-up (went in for elevated bp)       ASSESSMENT/PLAN:  Below is the assessment and plan developed based on review of pertinent history, physical exam, labs, studies, and medications. 1. Type 2 diabetes mellitus with hyperglycemia, with long-term current use of insulin (HCC)  -     dulaglutide (Trulicity) 3 VI/9.8 mL pnij; 3 mg by SubCUTAneous route every seven (7) days. , Normal, Disp-4 Each, R-4  -     HEMOGLOBIN A1C WITH EAG; Future  -     LIPID PANEL; Future  I am unsure what to expect as he is not monitoring BG at home. I encouraged him to schedule a f/u with Terrie. Continue with ongoing regimen of Jardiance, Trulicity, Basaglar, and Humalog. 2. Essential hypertension  -     atenoloL (TENORMIN) 50 mg tablet; Take 1 Tablet by mouth daily. , Normal, Disp-90 Tablet, R-0  -     hydroCHLOROthiazide (HYDRODIURIL) 25 mg tablet; TAKE 1 TABLET BY MOUTH ONCE DAILY *NEED  TO  MAKE  AN  APPOINTMENT*  Indications: fluid in the lungs due to chronic heart failure, Normal, Disp-90 Tablet, R-0  -     lisinopriL (PRINIVIL, ZESTRIL) 40 mg tablet; Take 1 Tablet by mouth daily. , Normal, Disp-90 Tablet, R-0  -     METABOLIC PANEL, COMPREHENSIVE; Future  BP is at goal. I do not recommend any change in medications (lisinopril, spironolactone, HCTZ, and atenolol). 3. New daily persistent headache  -     topiramate (TOPAMAX) 50 mg tablet; Take 1 Tablet by mouth two (2) times a day., Normal, Disp-60 Tablet, R-3  -     REFERRAL TO NEUROLOGY  Not at goal, I referred him to neurology and rx Topamax BID as a preventative regimen to manage migraines. Continue with Fioricet PRN. 4. Palpitations  -     dilTIAZem ER (CARDIZEM CD) 240 mg capsule; Take 1 Capsule by mouth daily. , Normal, Disp-90 Capsule, R-0  I recommended that he consult cardiology about his sx as instructed by the ED.  Continue with ongoing regimen of Diltiazem. 5. Moderate episode of recurrent major depressive disorder (HCC)  -     DULoxetine (CYMBALTA) 60 mg capsule; Take 1 Capsule by mouth daily. , Normal, Disp-30 Capsule, R-3  Not at goal, I strongly encouraged him to establish care with a psychiatrist. Genoveva Shaferpablo with ongoing regimen of Cymbalta. 6. Left wrist pain  -     ketorolac (TORADOL) 10 mg tablet; TAKE 1 TABLET BY MOUTH EVERY 6 HOURS AS NEEDED, Normal, Disp-60 Tablet, R-3  I will refill Ketorolac to manage his hand pain, but informed him that taking this TID is far too frequent and could cause kidney damage. I advised that he take this BID at maximum. SUBJECTIVE/OBJECTIVE:  HPI    ED f/u: Pt presented to the ED on 4/13/22 with c/o HA, nausea, diarrhea, palpitations, SOB, and CP. His head CT, chest x-ray, and labs were stable. He was instructed to comply with medications and f/u with cardiology. Of note, his palpitations have been present since his mothers passing. Mood: Pt reports depression and anxiety that decreases his motivation and social interaction. He states that today is the first time leaving house in 3 weeks. HA: Pt c/o nearly daily HA with associated neck stiffness and light sensitivity. He reports HA that last for days or weeks, which worsened after mother passed. Hyperlipidemia:  Cardiovascular risk analysis - 42 y.o. male LDL goal is under 100. ROS: taking medications as instructed, no medication side effects noted, no TIA's, no chest pain on exertion, no dyspnea on exertion, no swelling of ankles. Tolerating meds, no myalgias or other side effects noted. New concerns: Pt's last LDL was 129.6 on 8/3/21. Diabetic Review of Systems - medication compliance: compliant all of the time. Other symptoms and concerns: Pt's last a1c was 10.3 on 8/3/21. He denies at home monitoring.  Pt reports Basaglar 50 units and Humalog 40 or 50 units before each meal.     Hypertension ROS: taking medications as instructed, no medication side effects noted, no TIA's, no chest pain on exertion, no dyspnea on exertion, no swelling of ankles. BP in office today is 133/74. Arthralgias: Pt reports taking Ketorolac TID in hopes of managing his L hand pain. He states that this injury is the result of falling down 16 steps. Review of Systems   Eyes: Positive for photophobia. Cardiovascular: Positive for palpitations. Musculoskeletal: Positive for neck stiffness. Neurological: Positive for headaches. Psychiatric/Behavioral: Positive for dysphoric mood. The patient is nervous/anxious. All other systems reviewed and are negative. Physical Exam  Constitutional:       Appearance: Normal appearance. HENT:      Right Ear: Tympanic membrane and external ear normal.      Left Ear: Tympanic membrane and external ear normal.      Mouth/Throat:      Mouth: Mucous membranes are moist.      Pharynx: Oropharynx is clear. Cardiovascular:      Rate and Rhythm: Normal rate and regular rhythm. Pulses: Normal pulses. Heart sounds: Normal heart sounds. Pulmonary:      Effort: Pulmonary effort is normal.      Breath sounds: Normal breath sounds. Musculoskeletal:         General: Normal range of motion. Skin:     General: Skin is warm and dry. Neurological:      General: No focal deficit present. Mental Status: He is alert and oriented to person, place, and time. Psychiatric:         Mood and Affect: Mood normal.         Behavior: Behavior normal.       On this date 05/11/2022 I have spent 45 minutes reviewing previous notes, test results and face to face with the patient discussing the diagnosis and importance of compliance with the treatment plan as well as documenting on the day of the visit. An electronic signature was used to authenticate this note. Written by Shorty Parker as dictated by Dr. Vinicio Villafana.    -- Shorty Parker

## 2022-05-11 ENCOUNTER — OFFICE VISIT (OUTPATIENT)
Dept: INTERNAL MEDICINE CLINIC | Age: 47
End: 2022-05-11
Payer: MEDICAID

## 2022-05-11 VITALS
OXYGEN SATURATION: 95 % | HEIGHT: 72 IN | HEART RATE: 96 BPM | DIASTOLIC BLOOD PRESSURE: 74 MMHG | SYSTOLIC BLOOD PRESSURE: 133 MMHG | RESPIRATION RATE: 16 BRPM | TEMPERATURE: 100 F | BODY MASS INDEX: 56.96 KG/M2

## 2022-05-11 DIAGNOSIS — I10 ESSENTIAL HYPERTENSION: ICD-10-CM

## 2022-05-11 DIAGNOSIS — R00.2 PALPITATIONS: ICD-10-CM

## 2022-05-11 DIAGNOSIS — G44.52 NEW DAILY PERSISTENT HEADACHE: ICD-10-CM

## 2022-05-11 DIAGNOSIS — F33.1 MODERATE EPISODE OF RECURRENT MAJOR DEPRESSIVE DISORDER (HCC): ICD-10-CM

## 2022-05-11 DIAGNOSIS — E11.65 TYPE 2 DIABETES MELLITUS WITH HYPERGLYCEMIA, WITH LONG-TERM CURRENT USE OF INSULIN (HCC): Primary | ICD-10-CM

## 2022-05-11 DIAGNOSIS — Z79.4 TYPE 2 DIABETES MELLITUS WITH HYPERGLYCEMIA, WITH LONG-TERM CURRENT USE OF INSULIN (HCC): Primary | ICD-10-CM

## 2022-05-11 DIAGNOSIS — M25.532 LEFT WRIST PAIN: ICD-10-CM

## 2022-05-11 PROCEDURE — 99215 OFFICE O/P EST HI 40 MIN: CPT | Performed by: INTERNAL MEDICINE

## 2022-05-11 RX ORDER — DULAGLUTIDE 3 MG/.5ML
3 INJECTION, SOLUTION SUBCUTANEOUS
Qty: 4 EACH | Refills: 4 | Status: SHIPPED | OUTPATIENT
Start: 2022-05-11

## 2022-05-11 RX ORDER — LISINOPRIL 40 MG/1
40 TABLET ORAL DAILY
Qty: 90 TABLET | Refills: 0 | Status: SHIPPED | OUTPATIENT
Start: 2022-05-11 | End: 2022-08-22

## 2022-05-11 RX ORDER — KETOROLAC TROMETHAMINE 10 MG/1
TABLET, FILM COATED ORAL
Qty: 60 TABLET | Refills: 3 | Status: SHIPPED | OUTPATIENT
Start: 2022-05-11

## 2022-05-11 RX ORDER — DULAGLUTIDE 3 MG/.5ML
INJECTION, SOLUTION SUBCUTANEOUS
Qty: 4 EACH | Refills: 4 | Status: CANCELLED | OUTPATIENT
Start: 2022-05-11

## 2022-05-11 RX ORDER — DILTIAZEM HYDROCHLORIDE 240 MG/1
240 CAPSULE, EXTENDED RELEASE ORAL DAILY
Qty: 90 CAPSULE | Refills: 0 | Status: SHIPPED | OUTPATIENT
Start: 2022-05-11 | End: 2022-08-22

## 2022-05-11 RX ORDER — DULOXETIN HYDROCHLORIDE 60 MG/1
60 CAPSULE, DELAYED RELEASE ORAL DAILY
Qty: 30 CAPSULE | Refills: 3 | Status: SHIPPED | OUTPATIENT
Start: 2022-05-11

## 2022-05-11 RX ORDER — ATENOLOL 50 MG/1
50 TABLET ORAL DAILY
Qty: 90 TABLET | Refills: 0 | Status: SHIPPED | OUTPATIENT
Start: 2022-05-11 | End: 2022-08-22

## 2022-05-11 RX ORDER — HYDROCHLOROTHIAZIDE 25 MG/1
TABLET ORAL
Qty: 90 TABLET | Refills: 0 | Status: SHIPPED | OUTPATIENT
Start: 2022-05-11

## 2022-05-11 RX ORDER — CYCLOBENZAPRINE HCL 10 MG
10 TABLET ORAL
Qty: 90 TABLET | Refills: 0 | Status: SHIPPED | OUTPATIENT
Start: 2022-05-11

## 2022-05-11 RX ORDER — ALBUTEROL SULFATE 90 UG/1
AEROSOL, METERED RESPIRATORY (INHALATION)
Qty: 18 G | Refills: 4 | Status: SHIPPED | OUTPATIENT
Start: 2022-05-11

## 2022-05-11 RX ORDER — TOPIRAMATE 50 MG/1
50 TABLET, FILM COATED ORAL 2 TIMES DAILY
Qty: 60 TABLET | Refills: 3 | Status: SHIPPED | OUTPATIENT
Start: 2022-05-11 | End: 2022-09-26

## 2022-05-11 RX ORDER — DILTIAZEM HYDROCHLORIDE 240 MG/1
240 CAPSULE, COATED, EXTENDED RELEASE ORAL DAILY
Qty: 90 CAPSULE | Refills: 0 | Status: SHIPPED | OUTPATIENT
Start: 2022-05-11 | End: 2022-05-11 | Stop reason: CLARIF

## 2022-05-12 LAB
ALBUMIN SERPL-MCNC: 4.1 G/DL (ref 3.5–5)
ALBUMIN/GLOB SERPL: 1.1 {RATIO} (ref 1.1–2.2)
ALP SERPL-CCNC: 100 U/L (ref 45–117)
ALT SERPL-CCNC: 45 U/L (ref 12–78)
ANION GAP SERPL CALC-SCNC: 9 MMOL/L (ref 5–15)
AST SERPL-CCNC: 26 U/L (ref 15–37)
BILIRUB SERPL-MCNC: 0.5 MG/DL (ref 0.2–1)
BUN SERPL-MCNC: 18 MG/DL (ref 6–20)
BUN/CREAT SERPL: 14 (ref 12–20)
CALCIUM SERPL-MCNC: 9.7 MG/DL (ref 8.5–10.1)
CHLORIDE SERPL-SCNC: 98 MMOL/L (ref 97–108)
CHOLEST SERPL-MCNC: 176 MG/DL
CO2 SERPL-SCNC: 26 MMOL/L (ref 21–32)
CREAT SERPL-MCNC: 1.25 MG/DL (ref 0.7–1.3)
EST. AVERAGE GLUCOSE BLD GHB EST-MCNC: 292 MG/DL
GLOBULIN SER CALC-MCNC: 3.9 G/DL (ref 2–4)
GLUCOSE SERPL-MCNC: 220 MG/DL (ref 65–100)
HBA1C MFR BLD: 11.8 % (ref 4–5.6)
HDLC SERPL-MCNC: 51 MG/DL
HDLC SERPL: 3.5 {RATIO} (ref 0–5)
LDLC SERPL CALC-MCNC: 101.2 MG/DL (ref 0–100)
POTASSIUM SERPL-SCNC: 4.4 MMOL/L (ref 3.5–5.1)
PROT SERPL-MCNC: 8 G/DL (ref 6.4–8.2)
SODIUM SERPL-SCNC: 133 MMOL/L (ref 136–145)
TRIGL SERPL-MCNC: 119 MG/DL (ref ?–150)
VLDLC SERPL CALC-MCNC: 23.8 MG/DL

## 2022-05-16 ENCOUNTER — TELEPHONE (OUTPATIENT)
Dept: INTERNAL MEDICINE CLINIC | Age: 47
End: 2022-05-16

## 2022-05-17 RX ORDER — CELECOXIB 200 MG/1
200 CAPSULE ORAL DAILY
Qty: 30 CAPSULE | Refills: 2 | Status: SHIPPED | OUTPATIENT
Start: 2022-05-17 | End: 2022-08-15

## 2022-05-17 RX ORDER — DICLOFENAC SODIUM 75 MG/1
75 TABLET, DELAYED RELEASE ORAL 2 TIMES DAILY
Qty: 60 TABLET | Refills: 3 | Status: SHIPPED | OUTPATIENT
Start: 2022-05-17 | End: 2022-09-26

## 2022-05-17 NOTE — TELEPHONE ENCOUNTER
Spoke with patient and he states that he has taken diclofenac in the past for his wrist pain and it did not help. He states that he has been wearing his brace 24/7 and it is not helping at all. He awakens in the night due to the throbbing pain in his wrist.  Advised patient to reach out to his orthopedic doctor that preformed surgery on his other wrist for this same problem to get an appointment to discuss. Advised patient nurse will check back with provider to see if there is another alternative medication. Pt understood and was thankful for the call.

## 2022-05-17 NOTE — TELEPHONE ENCOUNTER
Spoke with patient and advised him that the celebrex rx has been called to his pharmacy. Pt understood and was thankful for the call.

## 2022-06-19 RX ORDER — INSULIN LISPRO 100 [IU]/ML
INJECTION, SOLUTION INTRAVENOUS; SUBCUTANEOUS
Qty: 45 ML | Refills: 0 | Status: SHIPPED | OUTPATIENT
Start: 2022-06-19 | End: 2022-09-26

## 2022-07-19 RX ORDER — SPIRONOLACTONE 25 MG/1
TABLET ORAL
Qty: 90 TABLET | Refills: 0 | Status: SHIPPED | OUTPATIENT
Start: 2022-07-19 | End: 2022-08-22

## 2022-07-19 RX ORDER — BLOOD SUGAR DIAGNOSTIC
STRIP MISCELLANEOUS
Qty: 100 PEN NEEDLE | Refills: 2 | Status: SHIPPED | OUTPATIENT
Start: 2022-07-19

## 2022-07-19 RX ORDER — OMEPRAZOLE 20 MG/1
CAPSULE, DELAYED RELEASE ORAL
Qty: 30 CAPSULE | Refills: 0 | Status: SHIPPED | OUTPATIENT
Start: 2022-07-19 | End: 2022-08-22

## 2022-07-19 RX ORDER — EMPAGLIFLOZIN 25 MG/1
TABLET, FILM COATED ORAL
Qty: 30 TABLET | Refills: 0 | Status: SHIPPED | OUTPATIENT
Start: 2022-07-19 | End: 2022-08-22

## 2022-08-22 DIAGNOSIS — I10 ESSENTIAL HYPERTENSION: ICD-10-CM

## 2022-08-22 RX ORDER — ATENOLOL 50 MG/1
TABLET ORAL
Qty: 30 TABLET | Refills: 0 | Status: SHIPPED | OUTPATIENT
Start: 2022-08-22

## 2022-08-22 RX ORDER — SPIRONOLACTONE 25 MG/1
TABLET ORAL
Qty: 90 TABLET | Refills: 0 | Status: SHIPPED | OUTPATIENT
Start: 2022-08-22

## 2022-08-22 RX ORDER — LISINOPRIL 40 MG/1
TABLET ORAL
Qty: 30 TABLET | Refills: 0 | Status: SHIPPED | OUTPATIENT
Start: 2022-08-22 | End: 2022-10-01

## 2022-08-22 RX ORDER — DILTIAZEM HYDROCHLORIDE 240 MG/1
CAPSULE, EXTENDED RELEASE ORAL
Qty: 30 CAPSULE | Refills: 0 | Status: SHIPPED | OUTPATIENT
Start: 2022-08-22 | End: 2022-09-26

## 2022-08-22 RX ORDER — EMPAGLIFLOZIN 25 MG/1
TABLET, FILM COATED ORAL
Qty: 30 TABLET | Refills: 0 | Status: SHIPPED | OUTPATIENT
Start: 2022-08-22 | End: 2022-09-26

## 2022-08-22 RX ORDER — INSULIN GLARGINE 100 [IU]/ML
INJECTION, SOLUTION SUBCUTANEOUS
Qty: 30 ML | Refills: 0 | Status: SHIPPED | OUTPATIENT
Start: 2022-08-22 | End: 2022-10-01

## 2022-08-22 RX ORDER — OMEPRAZOLE 20 MG/1
CAPSULE, DELAYED RELEASE ORAL
Qty: 30 CAPSULE | Refills: 0 | Status: SHIPPED | OUTPATIENT
Start: 2022-08-22

## 2022-09-30 DIAGNOSIS — I10 ESSENTIAL HYPERTENSION: ICD-10-CM

## 2022-10-01 RX ORDER — POLYETHYLENE GLYCOL 3350 17 G/17G
POWDER, FOR SOLUTION ORAL
Qty: 1020 G | Refills: 0 | Status: SHIPPED | OUTPATIENT
Start: 2022-10-01

## 2022-10-01 RX ORDER — INSULIN GLARGINE 100 [IU]/ML
INJECTION, SOLUTION SUBCUTANEOUS
Qty: 30 ML | Refills: 0 | Status: SHIPPED | OUTPATIENT
Start: 2022-10-01 | End: 2022-11-02 | Stop reason: CLARIF

## 2022-10-01 RX ORDER — LISINOPRIL 40 MG/1
TABLET ORAL
Qty: 30 TABLET | Refills: 0 | Status: SHIPPED | OUTPATIENT
Start: 2022-10-01

## 2022-11-02 RX ORDER — INSULIN GLARGINE 100 [IU]/ML
INJECTION, SOLUTION SUBCUTANEOUS
Qty: 30 ML | Refills: 0 | Status: SHIPPED | OUTPATIENT
Start: 2022-11-02 | End: 2022-11-29 | Stop reason: SDUPTHER

## 2022-11-29 ENCOUNTER — OFFICE VISIT (OUTPATIENT)
Dept: PRIMARY CARE CLINIC | Age: 47
End: 2022-11-29
Payer: MEDICAID

## 2022-11-29 VITALS
OXYGEN SATURATION: 97 % | RESPIRATION RATE: 20 BRPM | DIASTOLIC BLOOD PRESSURE: 102 MMHG | SYSTOLIC BLOOD PRESSURE: 139 MMHG | BODY MASS INDEX: 42.66 KG/M2 | WEIGHT: 315 LBS | HEIGHT: 72 IN | HEART RATE: 123 BPM | TEMPERATURE: 100.1 F

## 2022-11-29 DIAGNOSIS — R00.2 PALPITATIONS: ICD-10-CM

## 2022-11-29 DIAGNOSIS — E66.01 CLASS 3 SEVERE OBESITY DUE TO EXCESS CALORIES WITH SERIOUS COMORBIDITY AND BODY MASS INDEX (BMI) OF 50.0 TO 59.9 IN ADULT (HCC): ICD-10-CM

## 2022-11-29 DIAGNOSIS — F41.0 PANIC ATTACKS: ICD-10-CM

## 2022-11-29 DIAGNOSIS — M54.42 CHRONIC BILATERAL LOW BACK PAIN WITH BILATERAL SCIATICA: Primary | ICD-10-CM

## 2022-11-29 DIAGNOSIS — M54.41 CHRONIC BILATERAL LOW BACK PAIN WITH BILATERAL SCIATICA: Primary | ICD-10-CM

## 2022-11-29 DIAGNOSIS — E11.65 TYPE 2 DIABETES MELLITUS WITH HYPERGLYCEMIA, WITH LONG-TERM CURRENT USE OF INSULIN (HCC): ICD-10-CM

## 2022-11-29 DIAGNOSIS — F33.1 MODERATE EPISODE OF RECURRENT MAJOR DEPRESSIVE DISORDER (HCC): ICD-10-CM

## 2022-11-29 DIAGNOSIS — Z79.4 TYPE 2 DIABETES MELLITUS WITH HYPERGLYCEMIA, WITH LONG-TERM CURRENT USE OF INSULIN (HCC): ICD-10-CM

## 2022-11-29 DIAGNOSIS — G89.29 CHRONIC BILATERAL LOW BACK PAIN WITH BILATERAL SCIATICA: Primary | ICD-10-CM

## 2022-11-29 DIAGNOSIS — I10 ESSENTIAL HYPERTENSION: ICD-10-CM

## 2022-11-29 DIAGNOSIS — R00.0 TACHYCARDIA: ICD-10-CM

## 2022-11-29 PROCEDURE — 3074F SYST BP LT 130 MM HG: CPT | Performed by: NURSE PRACTITIONER

## 2022-11-29 PROCEDURE — 3078F DIAST BP <80 MM HG: CPT | Performed by: NURSE PRACTITIONER

## 2022-11-29 PROCEDURE — 99205 OFFICE O/P NEW HI 60 MIN: CPT | Performed by: NURSE PRACTITIONER

## 2022-11-29 PROCEDURE — 3046F HEMOGLOBIN A1C LEVEL >9.0%: CPT | Performed by: NURSE PRACTITIONER

## 2022-11-29 RX ORDER — SPIRONOLACTONE 25 MG/1
25 TABLET ORAL DAILY
Qty: 90 TABLET | Refills: 1 | Status: SHIPPED | OUTPATIENT
Start: 2022-11-29

## 2022-11-29 RX ORDER — BLOOD SUGAR DIAGNOSTIC
STRIP MISCELLANEOUS
Qty: 100 PEN NEEDLE | Refills: 2 | Status: SHIPPED | OUTPATIENT
Start: 2022-11-29

## 2022-11-29 RX ORDER — HYDROCHLOROTHIAZIDE 25 MG/1
TABLET ORAL
Qty: 90 TABLET | Refills: 0 | Status: SHIPPED | OUTPATIENT
Start: 2022-11-29

## 2022-11-29 RX ORDER — DULOXETIN HYDROCHLORIDE 60 MG/1
60 CAPSULE, DELAYED RELEASE ORAL DAILY
Qty: 90 CAPSULE | Refills: 1 | Status: SHIPPED | OUTPATIENT
Start: 2022-11-29

## 2022-11-29 RX ORDER — INSULIN LISPRO 100 [IU]/ML
INJECTION, SOLUTION INTRAVENOUS; SUBCUTANEOUS
Qty: 45 ML | Refills: 3 | Status: SHIPPED | OUTPATIENT
Start: 2022-11-29

## 2022-11-29 RX ORDER — INSULIN GLARGINE 100 [IU]/ML
INJECTION, SOLUTION SUBCUTANEOUS
Qty: 30 ML | Refills: 0 | Status: SHIPPED | OUTPATIENT
Start: 2022-11-29

## 2022-11-29 RX ORDER — FLASH GLUCOSE SENSOR
KIT MISCELLANEOUS
Qty: 6 KIT | Refills: 3 | Status: SHIPPED | OUTPATIENT
Start: 2022-11-29

## 2022-11-29 RX ORDER — LISINOPRIL 40 MG/1
40 TABLET ORAL DAILY
Qty: 90 TABLET | Refills: 1 | Status: SHIPPED | OUTPATIENT
Start: 2022-11-29

## 2022-11-29 RX ORDER — DULAGLUTIDE 3 MG/.5ML
3 INJECTION, SOLUTION SUBCUTANEOUS
Qty: 4 EACH | Refills: 4 | Status: SHIPPED | OUTPATIENT
Start: 2022-11-29

## 2022-11-29 RX ORDER — ROSUVASTATIN CALCIUM 5 MG/1
5 TABLET, COATED ORAL
Qty: 90 TABLET | Refills: 1 | Status: SHIPPED | OUTPATIENT
Start: 2022-11-29

## 2022-11-29 NOTE — PROGRESS NOTES
Chief Complaint   Patient presents with    Establish Care    Medication Refill       Visit Vitals  Pulse (!) 123   Temp 100.1 °F (37.8 °C) (Oral)   Resp 20   Ht 6' (1.829 m)   Wt (!) 387 lb (175.5 kg)   SpO2 97%   BMI 52.49 kg/m²       1. Have you been to the ER, urgent care clinic since your last visit? Hospitalized since your last visit? 4/30/2022 [facility] for elevated Blood pressure    2. Have you seen or consulted any other health care providers outside of the [address] [place] [address] since your last visit? Include any pap smears or colon screening.  No

## 2022-11-29 NOTE — PROGRESS NOTES
HISTORY OF PRESENT ILLNESS  Darrius Cardona is a 41 y.o. male presents to establish care with a PCP. Patient is new to this office. Transfering care from  practice in [place] due to distance. Diabetes: Last A1c was   Lab Results   Component Value Date/Time    Hemoglobin A1c 11.8 (H) 05/11/2022 12:02 PM    Hemoglobin A1c (POC) 10.3 08/03/2021 01:14 PM    Patient reported that he is using lantus, Humalog, trulicity and jardiance. Is not checking glucose levels at home. States that he doses 50 units of humalog at each meal.  Has freestyle demetris 2 but has not used yet. Was waiting for doctors appointment. Last eye exam was greater than 2 years ago. Hyperlipidemia: Mixed hyperlipidemia well controlled on crestor. Denies any leg cramps or malaise from this medication. Reported compliance with taking medication daily. Depression/Anxiety:  Patient notes that he become very depressed when his mother passed away in 2020. Patient reported he is using his cymablta with improvements in his mood. Still has some anxiety. Patient notes that he has chronic back and body pain. Patient notes that his chronic pain affects his depression. Chronic pain: Patient reported that he was seeing pain management, Dr. Eva Hunter,  until last year when the office he was going to closed down. States he has tried to call other offices however they want to use injections/physical therapy and not prescribe medication so he has not been able to find a good fit for pain management. Hypertension: Patients hypertension is well controlled on regimen of HCTZ, lisinopril, spironolactone. Tachycardia/Palpitations:  Patient reported that he is continuing to have heart palpitations and tachycardia. Patient reported that he was seen in the ER in April. Has not followed up with cardiology, states that he needs referral to closer area in [place]. Patient is continuing to use cardizem which was started in the ER.   States that he is under some stress and has anxiety. Occupation: is not working. Lives alone with cat. Vitals:    11/29/22 1053   BP: (!) 139/102   Pulse: (!) 123   Resp: 20   Temp: 100.1 °F (37.8 °C)   TempSrc: Oral   SpO2: 97%   Weight: (!) 387 lb (175.5 kg)   Height: 6' (1.829 m)     Patient Active Problem List   Diagnosis Code    HTN (hypertension) I10    Morbid obesity ([facility] [address] E66.01    Chronic back pain M54.9, G89.29    Dyslipidemia, goal LDL below 100 E78.5    JENNIFER (obstructive sleep apnea) G47.33    Type 2 diabetes mellitus with hyperglycemia, with long-term current use of insulin (HCC) E11.65, Z79.4    Recurrent depression ([facility]) F33.9    Carpal tunnel syndrome G56.00    Moderate episode of recurrent major depressive disorder ([facility] F33.1     Patient Active Problem List    Diagnosis Date Noted    Moderate episode of recurrent major depressive disorder ([facility] [address] 11/29/2022    Carpal tunnel syndrome 04/28/2020    Recurrent depression ([facility] [address] 12/21/2017    Type 2 diabetes mellitus with hyperglycemia, with long-term current use of insulin ([facility] 10/12/2015    JENNIFER (obstructive sleep apnea) 04/12/2012    Dyslipidemia, goal LDL below 100 07/01/2011    Chronic back pain 04/11/2011    HTN (hypertension) 03/18/2010    Morbid obesity ([facility] 03/18/2010     Current Outpatient Medications   Medication Sig Dispense Refill    dulaglutide (Trulicity) 3 LQ/9.3 mL pnij 3 mg by SubCUTAneous route every seven (7) days. 4 Each 4    insulin glargine (LANTUS,BASAGLAR) 100 unit/mL (3 mL) inpn Inject 50 units subcutaneously twice daily 30 mL 0    insulin lispro (HUMALOG) 100 unit/mL kwikpen Inject 50 units subcutaneously before breakfast, lunch and dinner. 45 mL 3    empagliflozin (Jardiance) 25 mg tablet Take 1 Tablet by mouth daily.  90 Tablet 1    Insulin Needles, Disposable, (BD Ultra-Fine Micro Pen Needle) 32 gauge x 1/4\" ndle USE 1 NEW PEN NEEDLE TO INJECT INSULIN SUBCUTANEOUSLY 4 TIMES DAILY 100 Pen Needle 2    DULoxetine (CYMBALTA) 60 mg capsule Take 1 Capsule by mouth daily. 90 Capsule 1    hydroCHLOROthiazide (HYDRODIURIL) 25 mg tablet TAKE 1 TABLET BY MOUTH ONCE DAILY *NEED  TO  MAKE  AN  APPOINTMENT*  Indications: fluid in the lungs due to chronic heart failure 90 Tablet 0    lisinopriL (PRINIVIL, ZESTRIL) 40 mg tablet Take 1 Tablet by mouth daily. 90 Tablet 1    rosuvastatin (CRESTOR) 5 mg tablet Take 1 Tablet by mouth nightly. 90 Tablet 1    spironolactone (ALDACTONE) 25 mg tablet Take 1 Tablet by mouth daily. 90 Tablet 1    flash glucose sensor (FreeStyle Rasta 2 Sensor) kit Use as directed to monitor glucose every 14 days 6 Kit 3    polyethylene glycol (MIRALAX) 17 gram/dose powder MIX AND DRINK 17 GRAMS WITH 8 OUNCES OF LIQUIDS TWICE DAILY 1020 g 0    diclofenac EC (VOLTAREN) 75 mg EC tablet Take 1 tablet by mouth twice daily 60 Tablet 3    topiramate (TOPAMAX) 50 mg tablet Take 1 tablet by mouth twice daily 60 Tablet 3    Cartia  mg capsule Take 1 capsule by mouth once daily 30 Capsule 3    omeprazole (PRILOSEC) 20 mg capsule Take 1 capsule by mouth once daily 30 Capsule 0    atenoloL (TENORMIN) 50 mg tablet Take 1 tablet by mouth once daily 30 Tablet 0    albuterol (PROVENTIL HFA, VENTOLIN HFA, PROAIR HFA) 90 mcg/actuation inhaler INHALE 1 PUFFS BY MOUTH EVERY 4 HOURS IF NEEDED FOR SHORTNESS OF BREATH 18 g 4    gabapentin (NEURONTIN) 600 mg tablet Take 600 mg by mouth three (3) times daily. fexofenadine (ALLEGRA) 180 mg tablet Take 180 mg by mouth.      lancets (ONETOUCH DELICA LANCETS) 30 gauge misc USE TO TEST BLOOD SUGAR THREE TIMES DAILY 100 Lancet 3    diclofenac (VOLTAREN) 1 % gel APPLY 4 GRAMS TO AFFECTED AREA 5 TIMES A DAY IF NEEDED FOR ARTHRITIS      clobetasol (TEMOVATE) 0.05 % ointment Apply 1 Applicator to affected area daily. 0    Insulin Needles, Disposable, (BD Ultra-Fine Mini Pen Needle) 31 gauge x 3/16\" ndle USE FOR INSULIN INJECTION ONCE DAILY.  100 Pen Needle 5    OneTouch Delica Plus Lancet 33 gauge misc USE TO TEST BLOOD SUGAR THREE TIMES DAILY (Patient not taking: Reported on 11/29/2022) 100 Lancet 1    OneTouch Ultra Blue Test Strip strip USE TO TEST BLOOD GLUCOSE THREE TIMES DAILY (Patient not taking: Reported on 11/29/2022) 100 Strip 0    Blood-Glucose Meter monitoring kit Pt needs One Touch Ultra glucometer to test blood sugars three times daily E11.9 (Patient not taking: Reported on 11/29/2022) 1 Kit 0     Allergies   Allergen Reactions    Latex Hives    Clindamycin-Benzoyl Peroxide Rash    Guanfacine Other (comments)     Blood came out of nose and very dry mouth     Metformin Diarrhea     Past Medical History:   Diagnosis Date    Arthritis     Asthma     Chronic back pain 04/11/2011    Dr. Meryle Stade - pain management    Depression     Diabetes ([facility] [address]     Dyslipidemia     HTN (hypertension)     Morbid obesity ([facility] [address]     JENNIFER (obstructive sleep apnea) 4/12/2012    Psychotic disorder ([facility] [address]      Past Surgical History:   Procedure Laterality Date    HX ORTHOPAEDIC Right 2019    hand surgery    IR INJ EPIDURAL LUMBAR SACRAL       Family History   Problem Relation Age of Onset    Cancer Mother     Heart Disease Mother     OSTEOARTHRITIS Mother     Diabetes Mother     Coronary Art Dis Mother     Hypertension Father     Stroke Father      Social History     Tobacco Use    Smoking status: Never    Smokeless tobacco: Never   Substance Use Topics    Alcohol use: No           Review of Systems   Constitutional:  Negative for malaise/fatigue and weight loss. Eyes:  Negative for blurred vision and double vision. Respiratory:  Negative for shortness of breath. Cardiovascular:  Negative for chest pain and palpitations. Musculoskeletal:  Positive for back pain. Neurological:  Positive for sensory change. Negative for dizziness, tingling, tremors and headaches. Psychiatric/Behavioral:  Positive for depression. Negative for suicidal ideas. The patient is nervous/anxious. The patient does not have insomnia.       Physical Exam  Constitutional:       Appearance: Normal appearance. He is obese. HENT:      Head: Normocephalic and atraumatic. Eyes:      Extraocular Movements: Extraocular movements intact. Conjunctiva/sclera: Conjunctivae normal.      Pupils: Pupils are equal, round, and reactive to light. Cardiovascular:      Rate and Rhythm: Normal rate and regular rhythm. Pulses: Normal pulses. Pulmonary:      Effort: Pulmonary effort is normal.      Breath sounds: Normal breath sounds. Musculoskeletal:         General: Normal range of motion. Skin:     General: Skin is warm and dry. Neurological:      General: No focal deficit present. Mental Status: He is alert and oriented to person, place, and time. Psychiatric:         Mood and Affect: Mood normal.         Behavior: Behavior normal.         ASSESSMENT and PLAN  Diagnoses and all orders for this visit:    1. Chronic bilateral low back pain with bilateral sciatica  Comments:  referral to pain management given. Orders:  -     DULoxetine (CYMBALTA) 60 mg capsule; Take 1 Capsule by mouth daily.  -     REFERRAL TO PAIN MANAGEMENT    2. Type 2 diabetes mellitus with hyperglycemia, with long-term current use of insulin ([facility])  Comments:  unclear control with no glucose readings to review. Checking labs. Orders:  -     dulaglutide (Trulicity) 3 SE/2.2 mL pnij; 3 mg by SubCUTAneous route every seven (7) days. -     insulin glargine (LANTUS,BASAGLAR) 100 unit/mL (3 mL) inpn; Inject 50 units subcutaneously twice daily  -     insulin lispro (HUMALOG) 100 unit/mL kwikpen; Inject 50 units subcutaneously before breakfast, lunch and dinner.  -     empagliflozin (Jardiance) 25 mg tablet; Take 1 Tablet by mouth daily.  -     Insulin Needles, Disposable, (BD Ultra-Fine Micro Pen Needle) 32 gauge x 1/4\" ndle; USE 1 NEW PEN NEEDLE TO INJECT INSULIN SUBCUTANEOUSLY 4 TIMES DAILY  -     rosuvastatin (CRESTOR) 5 mg tablet; Take 1 Tablet by mouth nightly.   -     flash glucose sensor (FreeStyle Rasta 2 Sensor) kit; Use as directed to monitor glucose every 14 days  -     CBC WITH AUTOMATED DIFF  -     METABOLIC PANEL, COMPREHENSIVE  -     HEMOGLOBIN A1C WITH EAG  -     LIPID PANEL  -     MICROALBUMIN, UR, RAND W/ MICROALB/CREAT RATIO    3. Moderate episode of recurrent major depressive disorder ([facility] [address]  Comments:  continue cymbalta. referral placed to psychiatrist to manage this and anxiety. Still grieving loss of his mother. Orders:  -     DULoxetine (CYMBALTA) 60 mg capsule; Take 1 Capsule by mouth daily.  -     REFERRAL TO PSYCHIATRY    4. Essential hypertension  -     hydroCHLOROthiazide (HYDRODIURIL) 25 mg tablet; TAKE 1 TABLET BY MOUTH ONCE DAILY *NEED  TO  MAKE  AN  APPOINTMENT*  Indications: fluid in the lungs due to chronic heart failure  -     lisinopriL (PRINIVIL, ZESTRIL) 40 mg tablet; Take 1 Tablet by mouth daily. -     spironolactone (ALDACTONE) 25 mg tablet; Take 1 Tablet by mouth daily.  -     REFERRAL TO CARDIOLOGY    5. Palpitations  Comments:  tachycardia today and experiencing continued palpitations. Referral to cardiology placed. Previously saw Dr. Kevin Bro. Orders:  -     REFERRAL TO CARDIOLOGY    6. Tachycardia  -     REFERRAL TO CARDIOLOGY    7. Panic attacks  -     REFERRAL TO PSYCHIATRY    8. Class 3 severe obesity due to excess calories with serious comorbidity and body mass index (BMI) of 50.0 to 59.9 in [facility])  Comments:  states he attempts to eat healthy but has an issue with portion control and does not exercise due to pain. On this date 11/29/2022 I have spent 60 minutes reviewing previous notes, test results and face to face with the patient discussing the diagnosis and plan of care as well as documenting on the day of the visit.     Tiffanie Rose NP

## 2022-11-30 LAB
ALBUMIN SERPL-MCNC: 4.9 G/DL (ref 4–5)
ALBUMIN/CREAT UR: 16 MG/G CREAT (ref 0–29)
ALBUMIN/GLOB SERPL: 1.8 {RATIO} (ref 1.2–2.2)
ALP SERPL-CCNC: 99 IU/L (ref 44–121)
ALT SERPL-CCNC: 26 IU/L (ref 0–44)
AST SERPL-CCNC: 18 IU/L (ref 0–40)
BASOPHILS # BLD AUTO: 0.1 X10E3/UL (ref 0–0.2)
BASOPHILS NFR BLD AUTO: 1 %
BILIRUB SERPL-MCNC: 0.4 MG/DL (ref 0–1.2)
BUN SERPL-MCNC: 13 MG/DL (ref 6–24)
BUN/CREAT SERPL: 12 (ref 9–20)
CALCIUM SERPL-MCNC: 9.4 MG/DL (ref 8.7–10.2)
CHLORIDE SERPL-SCNC: 99 MMOL/L (ref 96–106)
CHOLEST SERPL-MCNC: 191 MG/DL (ref 100–199)
CO2 SERPL-SCNC: 23 MMOL/L (ref 20–29)
CREAT SERPL-MCNC: 1.05 MG/DL (ref 0.76–1.27)
CREAT UR-MCNC: 204.9 MG/DL
EGFR: 89 ML/MIN/1.73
EOSINOPHIL # BLD AUTO: 0.1 X10E3/UL (ref 0–0.4)
EOSINOPHIL NFR BLD AUTO: 1 %
ERYTHROCYTE [DISTWIDTH] IN BLOOD BY AUTOMATED COUNT: 14.4 % (ref 11.6–15.4)
EST. AVERAGE GLUCOSE BLD GHB EST-MCNC: 229 MG/DL
GLOBULIN SER CALC-MCNC: 2.7 G/DL (ref 1.5–4.5)
GLUCOSE SERPL-MCNC: 161 MG/DL (ref 70–99)
HBA1C MFR BLD: 9.6 % (ref 4.8–5.6)
HCT VFR BLD AUTO: 53.1 % (ref 37.5–51)
HDLC SERPL-MCNC: 41 MG/DL
HGB BLD-MCNC: 17 G/DL (ref 13–17.7)
IMM GRANULOCYTES # BLD AUTO: 0.1 X10E3/UL (ref 0–0.1)
IMM GRANULOCYTES NFR BLD AUTO: 1 %
LDLC SERPL CALC-MCNC: 132 MG/DL (ref 0–99)
LYMPHOCYTES # BLD AUTO: 3.3 X10E3/UL (ref 0.7–3.1)
LYMPHOCYTES NFR BLD AUTO: 39 %
MCH RBC QN AUTO: 26.4 PG (ref 26.6–33)
MCHC RBC AUTO-ENTMCNC: 32 G/DL (ref 31.5–35.7)
MCV RBC AUTO: 82 FL (ref 79–97)
MICROALBUMIN UR-MCNC: 32.8 UG/ML
MONOCYTES # BLD AUTO: 0.7 X10E3/UL (ref 0.1–0.9)
MONOCYTES NFR BLD AUTO: 8 %
NEUTROPHILS # BLD AUTO: 4.3 X10E3/UL (ref 1.4–7)
NEUTROPHILS NFR BLD AUTO: 50 %
PLATELET # BLD AUTO: 381 X10E3/UL (ref 150–450)
POTASSIUM SERPL-SCNC: 4 MMOL/L (ref 3.5–5.2)
PROT SERPL-MCNC: 7.6 G/DL (ref 6–8.5)
RBC # BLD AUTO: 6.45 X10E6/UL (ref 4.14–5.8)
SODIUM SERPL-SCNC: 139 MMOL/L (ref 134–144)
TRIGL SERPL-MCNC: 98 MG/DL (ref 0–149)
VLDLC SERPL CALC-MCNC: 18 MG/DL (ref 5–40)
WBC # BLD AUTO: 8.4 X10E3/UL (ref 3.4–10.8)

## 2022-12-02 NOTE — PROGRESS NOTES
Can you please contact patient to get him scheduled for 3 month follow up with DR. Salazar or Dr. Ivy Nicholas please.

## 2022-12-13 ENCOUNTER — OFFICE VISIT (OUTPATIENT)
Dept: CARDIOLOGY CLINIC | Age: 47
End: 2022-12-13
Payer: MEDICAID

## 2022-12-13 VITALS
DIASTOLIC BLOOD PRESSURE: 88 MMHG | SYSTOLIC BLOOD PRESSURE: 138 MMHG | HEIGHT: 72 IN | OXYGEN SATURATION: 97 % | HEART RATE: 110 BPM | WEIGHT: 315 LBS | BODY MASS INDEX: 42.66 KG/M2

## 2022-12-13 DIAGNOSIS — E78.5 DYSLIPIDEMIA, GOAL LDL BELOW 100: ICD-10-CM

## 2022-12-13 DIAGNOSIS — E66.01 MORBID OBESITY (HCC): ICD-10-CM

## 2022-12-13 DIAGNOSIS — I10 PRIMARY HYPERTENSION: Primary | ICD-10-CM

## 2022-12-13 DIAGNOSIS — R00.2 PALPITATIONS: ICD-10-CM

## 2022-12-13 PROCEDURE — 3074F SYST BP LT 130 MM HG: CPT | Performed by: SPECIALIST

## 2022-12-13 PROCEDURE — 99204 OFFICE O/P NEW MOD 45 MIN: CPT | Performed by: SPECIALIST

## 2022-12-13 PROCEDURE — 3078F DIAST BP <80 MM HG: CPT | Performed by: SPECIALIST

## 2022-12-13 NOTE — PROGRESS NOTES
Brian Powell MD. [facility]          Patient: Freddie Harrison  : 1975      Today's Date: 2022        HISTORY OF PRESENT ILLNESS:     History of Present Illness:    I last saw him 5 years ago. In April having dizziness and falls - no LOC. Also with atypical CP, MARCELINO and palpitations -- going on for a couple of years   Has random CP - substernal - lasts a second - comes and goes. Pain is not exertional.          PAST MEDICAL HISTORY:     Past Medical History:   Diagnosis Date    Arthritis     Asthma     Chronic back pain 2011    Dr. Le Fraction - pain management    Depression     Diabetes ([facility]     Dyslipidemia     HTN (hypertension)     Morbid obesity ([facility] [address]     JENNIFER (obstructive sleep apnea) 2012    Psychotic disorder ([facility] [address]        Past Surgical History:   Procedure Laterality Date    HX ORTHOPAEDIC Right 2019    hand surgery    IR INJ EPIDURAL LUMBAR SACRAL               CURRENT MEDICATIONS:    .  Current Outpatient Medications   Medication Sig Dispense Refill    dulaglutide (Trulicity) 3 FX/8.7 mL pnij 3 mg by SubCUTAneous route every seven (7) days. 4 Each 4    insulin glargine (LANTUS,BASAGLAR) 100 unit/mL (3 mL) inpn Inject 50 units subcutaneously twice daily 30 mL 0    insulin lispro (HUMALOG) 100 unit/mL kwikpen Inject 50 units subcutaneously before breakfast, lunch and dinner. 45 mL 3    empagliflozin (Jardiance) 25 mg tablet Take 1 Tablet by mouth daily. 90 Tablet 1    Insulin Needles, Disposable, (BD Ultra-Fine Micro Pen Needle) 32 gauge x 1/4\" ndle USE 1 NEW PEN NEEDLE TO INJECT INSULIN SUBCUTANEOUSLY 4 TIMES DAILY 100 Pen Needle 2    DULoxetine (CYMBALTA) 60 mg capsule Take 1 Capsule by mouth daily. 90 Capsule 1    hydroCHLOROthiazide (HYDRODIURIL) 25 mg tablet TAKE 1 TABLET BY MOUTH ONCE DAILY *NEED  TO  MAKE  AN  APPOINTMENT*  Indications: fluid in the lungs due to chronic heart failure 90 Tablet 0    lisinopriL (PRINIVIL, ZESTRIL) 40 mg tablet Take 1 Tablet by mouth daily.  [address] Tablet 1    rosuvastatin (CRESTOR) 5 mg tablet Take 1 Tablet by mouth nightly. 90 Tablet 1    spironolactone (ALDACTONE) 25 mg tablet Take 1 Tablet by mouth daily. 90 Tablet 1    flash glucose sensor (FreeStyle Rasta 2 Sensor) kit Use as directed to monitor glucose every 14 days 6 Kit 3    polyethylene glycol (MIRALAX) 17 gram/dose powder MIX AND DRINK 17 GRAMS WITH 8 OUNCES OF LIQUIDS TWICE DAILY 1020 g 0    diclofenac EC (VOLTAREN) 75 mg EC tablet Take 1 tablet by mouth twice daily 60 Tablet 3    topiramate (TOPAMAX) 50 mg tablet Take 1 tablet by mouth twice daily 60 Tablet 3    Cartia  mg capsule Take 1 capsule by mouth once daily 30 Capsule 3    omeprazole (PRILOSEC) 20 mg capsule Take 1 capsule by mouth once daily 30 Capsule 0    atenoloL (TENORMIN) 50 mg tablet Take 1 tablet by mouth once daily 30 Tablet 0    albuterol (PROVENTIL HFA, VENTOLIN HFA, PROAIR HFA) 90 mcg/actuation inhaler INHALE 1 PUFFS BY MOUTH EVERY 4 HOURS IF NEEDED FOR SHORTNESS OF BREATH 18 g 4    gabapentin (NEURONTIN) 600 mg tablet Take 600 mg by mouth three (3) times daily. fexofenadine (ALLEGRA) 180 mg tablet Take 180 mg by mouth. Insulin Needles, Disposable, (BD Ultra-Fine Mini Pen Needle) 31 gauge x 3/16\" ndle USE FOR INSULIN INJECTION ONCE DAILY. 100 Pen Needle 5    OneTouch Delica Plus Lancet 33 gauge misc USE TO TEST BLOOD SUGAR THREE TIMES DAILY 100 Lancet 1    OneTouch Ultra Blue Test Strip strip USE TO TEST BLOOD GLUCOSE THREE TIMES DAILY 100 Strip 0    lancets (ONETOUCH DELICA LANCETS) 30 gauge misc USE TO TEST BLOOD SUGAR THREE TIMES DAILY 100 Lancet 3    diclofenac (VOLTAREN) 1 % gel APPLY 4 GRAMS TO AFFECTED AREA 5 TIMES A DAY IF NEEDED FOR ARTHRITIS      clobetasol (TEMOVATE) 0.05 % ointment Apply 1 Applicator to affected area daily.   0    Blood-Glucose Meter monitoring kit Pt needs One Touch Ultra glucometer to test blood sugars three times daily E11.9 1 Kit 0       Allergies   Allergen Reactions    Latex Hives    Clindamycin-Benzoyl Peroxide Rash    Guanfacine Other (comments)     Blood came out of nose and very dry mouth     Metformin Diarrhea         SOCIAL HISTORY:     Social History     Tobacco Use    Smoking status: Never    Smokeless tobacco: Never   Substance Use Topics    Alcohol use: No    Drug use: No         FAMILY HISTORY:     Family History   Problem Relation Age of Onset    Cancer Mother     Heart Disease Mother     OSTEOARTHRITIS Mother     Diabetes Mother     Coronary Art Dis Mother     Hypertension Father     Stroke Father          REVIEW OF SYMPTOMS:     Review of Symptoms:  Constitutional: Negative for fever, chills  HEENT: Negative for nosebleeds, tinnitus, and vision changes. Respiratory: Negative for cough, wheezing  Cardiovascular: Negative for orthopnea, claudication, syncope, and PND. Gastrointestinal: Negative for abdominal pain, diarrhea, melena. Genitourinary: Negative for dysuria  Musculoskeletal: + Joint pain   Skin: Negative for rash  Heme: No problems bleeding. Neurological: Negative for speech change and focal weakness. PHYSICAL EXAM:     Physical Exam:  Visit Vitals  /88 (BP 1 Location: Left upper arm, BP Patient Position: Sitting, BP Cuff Size: Large adult)   Pulse (!) 110   Ht 6' (1.829 m)   Wt (!) 390 lb (176.9 kg)   SpO2 97%   BMI 52.89 kg/m²       Patient appears generally well, mood and affect are appropriate and pleasant. HEENT:  Hearing intact, non-icteric, normocephalic, atraumatic. Neck Exam: Supple  Lung Exam: Clear to auscultation, even breath sounds. Cardiac Exam: Regular rate and rhythm with no murmur or rub  Abdomen: Soft, non-tender, obese   Extremities: Moves all ext well. No lower extremity edema.   MSKTL: Overall good ROM ext  Skin: No significant rashes  Psych: Appropriate affect  Neuro - Grossly intact        LABS / OTHER STUDIES:     Lab Results   Component Value Date/Time    Sodium 139 11/29/2022 11:46 AM    Potassium 4.0 11/29/2022 11:46 AM    Chloride 99 11/29/2022 11:46 AM    CO2 23 11/29/2022 11:46 AM    Anion gap 9 05/11/2022 12:02 PM    Glucose 161 (H) 11/29/2022 11:46 AM    BUN 13 11/29/2022 11:46 AM    Creatinine 1.05 11/29/2022 11:46 AM    BUN/Creatinine ratio 12 11/29/2022 11:46 AM    GFR est AA >60 05/11/2022 12:02 PM    GFR est non-AA >60 05/11/2022 12:02 PM    Calcium 9.4 11/29/2022 11:46 AM    Bilirubin, total 0.4 11/29/2022 11:46 AM    Alk. phosphatase 99 11/29/2022 11:46 AM    Protein, total 7.6 11/29/2022 11:46 AM    Albumin 4.9 11/29/2022 11:46 AM    Globulin 3.9 05/11/2022 12:02 PM    A-G Ratio 1.8 11/29/2022 11:46 AM    ALT (SGPT) 26 11/29/2022 11:46 AM    AST (SGOT) 18 11/29/2022 11:46 AM     Lab Results   Component Value Date/Time    WBC 8.4 11/29/2022 11:46 AM    HGB 17.0 11/29/2022 11:46 AM    HCT 53.1 (H) 11/29/2022 11:46 AM    PLATELET 488 98/25/0041 11:46 AM    MCV 82 11/29/2022 11:46 AM       Lab Results   Component Value Date/Time    TSH 1.85 08/03/2021 02:01 PM       Lab Results   Component Value Date/Time    Cholesterol, total 191 11/29/2022 11:46 AM    HDL Cholesterol 41 11/29/2022 11:46 AM    LDL, calculated 132 (H) 11/29/2022 11:46 AM    LDL, calculated 101.2 (H) 05/11/2022 12:02 PM    VLDL, calculated 18 11/29/2022 11:46 AM    VLDL, calculated 23.8 05/11/2022 12:02 PM    Triglyceride 98 11/29/2022 11:46 AM    CHOL/HDL Ratio 3.5 05/11/2022 12:02 PM           CARDIAC DIAGNOSTICS:     Cardiac Evaluation Includes:  I reviewed the test results below. EKG 5/15/17 - NSR, normal   EKG 4/13/22 - NSR, PRWP          Treadmill Stress test 6/14/17 - sinus tach () at rest. Walked 8:10 on modified Cyrus (4.6 METS), stopping for SOB/knee pain. Reached 95% MPHR. No ischemic EKG changes. /86 at start of study. Echo 6/14/17 - LVEF 55-60%, normal study       Holter 6/14/17 - HR , Avg 81. 1 PVC and no PAC's. Normal study. Symptoms correlate with sinus / sinus tach.         ASSESSMENT AND PLAN: Assessment and Plan:      1) Atypical chest pain, MARCELINO and palpitations  - has sharp CP for a second at a time - Symptoms atypical for angina. Has flipped beats when he Is stressed out. - Treadmill Stress test 6/14/17 - sinus tach () at rest. Walked 8:10 on modified Cyrus (4.6 METS), stopping for SOB/knee pain. Reached 95% MPHR. No ischemic EKG changes. /86 at start of study. - Echo 6/14/17 - LVEF 55-60%, normal study   - Holter 6/14/17 was normal   - On 12/13/22 - he returns with chronic symptoms but more intense. Will check an echo, treadmill stress test, and a 3 day Holter. Want to assess 24 hr HR. I suspect anxiety playing role in symptoms. 2) Difficult to control HTN  - problems likely due to morbid obesity. Stan/renin level OK  - On 12/13/22 - BP remains high in the office despite meds ---> discussed adding clonidine but he prefers to avoid adding any meds right now. Will work on med compliance, diet and reassess in a few weeks   - cont cardizem, atenolol, lisinopril, HCTZ, and aldactone  --- can add clonidine later if needed       3) CV risk management   - discussed diet, exercise, weight loss  - cont statin      4) Obesity   - he has previously looked into weight loss surgery       5) Chronic pain  - managed by pain management       6) JENNIFER on CPAP     7) DM    8) See me in 6 weeks       On Disability after injury from [place] years ago. Single. Has a cat. Enjoys singing. Mom passed away. Scott Bhatti MD, [facility] [address] [place] [address], [address]  [address]  Ph: [phone]   Ph [phone]        ADDENDUM   1/9/2023  3 Day Holter 12/23/22 - NSR, HR  bpm, Avg hR 94 bpm.   Tachycardic 22% of the time.       I see patient in a couple of weeks

## 2022-12-13 NOTE — PROGRESS NOTES
Aliza Hebert is a 41 y.o. male    Visit Vitals  /88 (BP 1 Location: Left upper arm, BP Patient Position: Sitting, BP Cuff Size: Large adult)   Pulse (!) 110   Ht 6' (1.829 m)   Wt (!) 390 lb (176.9 kg)   SpO2 97%   BMI 52.89 kg/m²       Chief Complaint   Patient presents with    Hypertension    Cholesterol Problem    Establish Care       Chest pain NO  SOB WHEN WALKING AND TALKING AT THE SAME TIME  Dizziness YES, WITH HIGH BP  Swelling NO  Recent hospital visit NO  Refills NO  COVID VACCINE STATUS YES  HAD COVID?  NO

## 2022-12-19 ENCOUNTER — TELEPHONE (OUTPATIENT)
Dept: CARDIOLOGY CLINIC | Age: 47
End: 2022-12-19

## 2022-12-19 NOTE — TELEPHONE ENCOUNTER
Enrolled with [employer] - Ordered and being shipped to patient's home address on file. ETA within 5-7 business days. RE: 3 day holter  Received:  Today  Willi Hussein Carry         Previous Messages     ----- Message -----   From: Lissette Gonzalez MD   Sent: 12/13/2022   2:49 PM EST   To: Alex Valladares   Subject: 3 day holter                                     3 day holter for palpitations     Thanks   SK

## 2022-12-23 ENCOUNTER — TELEPHONE (OUTPATIENT)
Dept: CARDIOLOGY CLINIC | Age: 47
End: 2022-12-23

## 2022-12-23 NOTE — TELEPHONE ENCOUNTER
Pt wanted to know how long he has to wear his holtor monitor and if he would be able to add a clear bandage over it, due to it starting to peel off. Pt would like a call back.     FG#[phone]

## 2022-12-23 NOTE — TELEPHONE ENCOUNTER
Spoke to pt,  Discussed monitor and that it's a 3 day, able to tape edges down. OK it curls up some.

## 2022-12-30 NOTE — PROGRESS NOTES
Orders for Check an echo and treadmill stress test when possible.        See me in 6 weeks  per Dr. Stover Failing VO.

## 2023-02-06 DIAGNOSIS — Z79.4 TYPE 2 DIABETES MELLITUS WITH HYPERGLYCEMIA, WITH LONG-TERM CURRENT USE OF INSULIN (HCC): ICD-10-CM

## 2023-02-06 DIAGNOSIS — I10 ESSENTIAL HYPERTENSION: ICD-10-CM

## 2023-02-06 DIAGNOSIS — E11.65 TYPE 2 DIABETES MELLITUS WITH HYPERGLYCEMIA, WITH LONG-TERM CURRENT USE OF INSULIN (HCC): ICD-10-CM

## 2023-02-08 RX ORDER — ATENOLOL 50 MG/1
50 TABLET ORAL DAILY
Qty: 30 TABLET | Refills: 1 | Status: SHIPPED | OUTPATIENT
Start: 2023-02-08

## 2023-02-08 RX ORDER — DICLOFENAC SODIUM 75 MG/1
75 TABLET, DELAYED RELEASE ORAL 2 TIMES DAILY
Qty: 60 TABLET | Refills: 1 | Status: SHIPPED | OUTPATIENT
Start: 2023-02-08

## 2023-02-08 RX ORDER — OMEPRAZOLE 20 MG/1
20 CAPSULE, DELAYED RELEASE ORAL DAILY
Qty: 30 CAPSULE | Refills: 1 | Status: SHIPPED | OUTPATIENT
Start: 2023-02-08

## 2023-02-08 RX ORDER — FLASH GLUCOSE SENSOR
KIT MISCELLANEOUS
Qty: 6 KIT | Refills: 3 | Status: SHIPPED | OUTPATIENT
Start: 2023-02-08

## 2023-02-15 ENCOUNTER — TELEPHONE (OUTPATIENT)
Dept: PRIMARY CARE CLINIC | Age: 48
End: 2023-02-15

## 2023-02-17 ENCOUNTER — TELEPHONE (OUTPATIENT)
Dept: PRIMARY CARE CLINIC | Age: 48
End: 2023-02-17

## 2023-02-17 NOTE — TELEPHONE ENCOUNTER
Approvedon February 15  Request Reference Number: [id number]. [address] K[address] SENSOR is approved through 02/15/2024. For further questions, call [employer] at [phone].

## 2023-03-09 ENCOUNTER — TRANSCRIBE ORDER (OUTPATIENT)
Dept: SCHEDULING | Age: 48
End: 2023-03-09

## 2023-03-09 DIAGNOSIS — M54.17 RADICULOPATHY, LUMBOSACRAL REGION: Primary | ICD-10-CM

## 2023-03-09 DIAGNOSIS — I10 ESSENTIAL HYPERTENSION: ICD-10-CM

## 2023-03-09 RX ORDER — HYDROCHLOROTHIAZIDE 25 MG/1
TABLET ORAL
Qty: 90 TABLET | Refills: 0 | Status: SHIPPED | OUTPATIENT
Start: 2023-03-09

## 2023-03-10 ENCOUNTER — ANCILLARY PROCEDURE (OUTPATIENT)
Dept: CARDIOLOGY CLINIC | Age: 48
End: 2023-03-10

## 2023-03-10 VITALS
BODY MASS INDEX: 42.66 KG/M2 | WEIGHT: 315 LBS | SYSTOLIC BLOOD PRESSURE: 140 MMHG | DIASTOLIC BLOOD PRESSURE: 86 MMHG | HEIGHT: 72 IN

## 2023-03-10 VITALS — WEIGHT: 315 LBS | HEIGHT: 72 IN | BODY MASS INDEX: 42.66 KG/M2

## 2023-03-10 DIAGNOSIS — R00.2 PALPITATIONS: ICD-10-CM

## 2023-03-10 DIAGNOSIS — I10 PRIMARY HYPERTENSION: ICD-10-CM

## 2023-03-10 LAB
ECHO LV E' LATERAL VELOCITY: 8 CM/S
ECHO LV EDV A2C: 93 ML
ECHO LV EDV A4C: 109 ML
ECHO LV EDV BP: 101 ML (ref 67–155)
ECHO LV EDV INDEX A4C: 39 ML/M2
ECHO LV EDV INDEX BP: 36 ML/M2
ECHO LV EDV NDEX A2C: 33 ML/M2
ECHO LV EJECTION FRACTION A2C: 61 %
ECHO LV EJECTION FRACTION A4C: 69 %
ECHO LV EJECTION FRACTION BIPLANE: 65 % (ref 55–100)
ECHO LV ESV A2C: 36 ML
ECHO LV ESV A4C: 34 ML
ECHO LV ESV BP: 34 ML (ref 22–58)
ECHO LV ESV INDEX A2C: 13 ML/M2
ECHO LV ESV INDEX A4C: 12 ML/M2
ECHO LV ESV INDEX BP: 12 ML/M2
ECHO LV FRACTIONAL SHORTENING: 33 % (ref 28–44)
ECHO LV INTERNAL DIMENSION DIASTOLE INDEX: 1.06 CM/M2
ECHO LV INTERNAL DIMENSION DIASTOLIC: 3 CM (ref 4.2–5.9)
ECHO LV INTERNAL DIMENSION SYSTOLIC INDEX: 0.71 CM/M2
ECHO LV INTERNAL DIMENSION SYSTOLIC: 2 CM
ECHO LV IVSD: 1.9 CM (ref 0.6–1)
ECHO LV MASS 2D: 217.3 G (ref 88–224)
ECHO LV MASS INDEX 2D: 77.1 G/M2 (ref 49–115)
ECHO LV POSTERIOR WALL DIASTOLIC: 1.7 CM (ref 0.6–1)
ECHO LV RELATIVE WALL THICKNESS RATIO: 1.13
STRESS ANGINA INDEX: 0
STRESS BASELINE DIAS BP: 98 MMHG
STRESS BASELINE HR: 117 BPM
STRESS BASELINE SYS BP: 152 MMHG
STRESS ESTIMATED WORKLOAD: 5.4 METS
STRESS EXERCISE DUR MIN: 6 MIN
STRESS EXERCISE DUR SEC: 37 SEC
STRESS O2 SAT PEAK: 95 %
STRESS O2 SAT REST: 98 %
STRESS PEAK DIAS BP: 96 MMHG
STRESS PEAK SYS BP: 220 MMHG
STRESS PERCENT HR ACHIEVED: 99 %
STRESS POST PEAK HR: 173 BPM
STRESS RATE PRESSURE PRODUCT: NORMAL BPM*MMHG
STRESS TARGET HR: 175 BPM

## 2023-04-03 ENCOUNTER — HOSPITAL ENCOUNTER (OUTPATIENT)
Dept: MRI IMAGING | Age: 48
End: 2023-04-03
Attending: INTERNAL MEDICINE
Payer: MEDICAID

## 2023-04-03 DIAGNOSIS — M54.17 RADICULOPATHY, LUMBOSACRAL REGION: ICD-10-CM

## 2023-04-03 PROCEDURE — 72148 MRI LUMBAR SPINE W/O DYE: CPT

## 2023-04-19 ENCOUNTER — OFFICE VISIT (OUTPATIENT)
Dept: PRIMARY CARE CLINIC | Age: 48
End: 2023-04-19
Payer: MEDICAID

## 2023-04-19 VITALS
DIASTOLIC BLOOD PRESSURE: 80 MMHG | OXYGEN SATURATION: 95 % | BODY MASS INDEX: 42.66 KG/M2 | TEMPERATURE: 99.4 F | HEIGHT: 72 IN | SYSTOLIC BLOOD PRESSURE: 121 MMHG | RESPIRATION RATE: 20 BRPM | WEIGHT: 315 LBS | HEART RATE: 102 BPM

## 2023-04-19 DIAGNOSIS — F33.1 MODERATE EPISODE OF RECURRENT MAJOR DEPRESSIVE DISORDER (HCC): ICD-10-CM

## 2023-04-19 DIAGNOSIS — G44.52 NEW DAILY PERSISTENT HEADACHE: ICD-10-CM

## 2023-04-19 DIAGNOSIS — Z79.4 TYPE 2 DIABETES MELLITUS WITH HYPERGLYCEMIA, WITH LONG-TERM CURRENT USE OF INSULIN (HCC): ICD-10-CM

## 2023-04-19 DIAGNOSIS — G89.29 CHRONIC BILATERAL LOW BACK PAIN WITH BILATERAL SCIATICA: ICD-10-CM

## 2023-04-19 DIAGNOSIS — M54.41 CHRONIC BILATERAL LOW BACK PAIN WITH BILATERAL SCIATICA: ICD-10-CM

## 2023-04-19 DIAGNOSIS — K59.03 DRUG-INDUCED CONSTIPATION: ICD-10-CM

## 2023-04-19 DIAGNOSIS — K21.9 GERD WITHOUT ESOPHAGITIS: ICD-10-CM

## 2023-04-19 DIAGNOSIS — R00.0 TACHYCARDIA: Primary | ICD-10-CM

## 2023-04-19 DIAGNOSIS — M54.42 CHRONIC BILATERAL LOW BACK PAIN WITH BILATERAL SCIATICA: ICD-10-CM

## 2023-04-19 DIAGNOSIS — I10 ESSENTIAL HYPERTENSION: ICD-10-CM

## 2023-04-19 DIAGNOSIS — E11.65 TYPE 2 DIABETES MELLITUS WITH HYPERGLYCEMIA, WITH LONG-TERM CURRENT USE OF INSULIN (HCC): ICD-10-CM

## 2023-04-19 PROCEDURE — 99215 OFFICE O/P EST HI 40 MIN: CPT | Performed by: NURSE PRACTITIONER

## 2023-04-19 PROCEDURE — 3079F DIAST BP 80-89 MM HG: CPT | Performed by: NURSE PRACTITIONER

## 2023-04-19 PROCEDURE — 3074F SYST BP LT 130 MM HG: CPT | Performed by: NURSE PRACTITIONER

## 2023-04-19 RX ORDER — ROSUVASTATIN CALCIUM 5 MG/1
5 TABLET, COATED ORAL
Qty: 90 TABLET | Refills: 1 | Status: SHIPPED | OUTPATIENT
Start: 2023-04-19 | End: 2023-04-20 | Stop reason: SDUPTHER

## 2023-04-19 RX ORDER — DULOXETIN HYDROCHLORIDE 60 MG/1
60 CAPSULE, DELAYED RELEASE ORAL DAILY
Qty: 90 CAPSULE | Refills: 1 | Status: SHIPPED | OUTPATIENT
Start: 2023-04-19

## 2023-04-19 RX ORDER — OMEPRAZOLE 20 MG/1
20 CAPSULE, DELAYED RELEASE ORAL DAILY
Qty: 30 CAPSULE | Refills: 1 | Status: SHIPPED | OUTPATIENT
Start: 2023-04-19

## 2023-04-19 RX ORDER — POLYETHYLENE GLYCOL 3350 17 G/17G
17 POWDER, FOR SOLUTION ORAL
Qty: 1020 G | Refills: 1 | Status: SHIPPED | OUTPATIENT
Start: 2023-04-19

## 2023-04-19 RX ORDER — DULAGLUTIDE 3 MG/.5ML
3 INJECTION, SOLUTION SUBCUTANEOUS
Qty: 4 EACH | Refills: 4 | Status: SHIPPED | OUTPATIENT
Start: 2023-04-19

## 2023-04-19 RX ORDER — TOPIRAMATE 50 MG/1
50 TABLET, FILM COATED ORAL 2 TIMES DAILY
Qty: 180 TABLET | Refills: 3 | Status: SHIPPED | OUTPATIENT
Start: 2023-04-19

## 2023-04-19 RX ORDER — TRAMADOL HYDROCHLORIDE 50 MG/1
100 TABLET ORAL 2 TIMES DAILY
COMMUNITY
Start: 2023-03-28

## 2023-04-19 RX ORDER — GABAPENTIN 300 MG/1
CAPSULE ORAL
COMMUNITY
Start: 2023-03-13

## 2023-04-19 RX ORDER — DILTIAZEM HYDROCHLORIDE 240 MG/1
240 CAPSULE, EXTENDED RELEASE ORAL DAILY
Qty: 90 CAPSULE | Refills: 1 | Status: SHIPPED | OUTPATIENT
Start: 2023-04-19

## 2023-04-19 RX ORDER — SPIRONOLACTONE 25 MG/1
25 TABLET ORAL DAILY
Qty: 90 TABLET | Refills: 1 | Status: SHIPPED | OUTPATIENT
Start: 2023-04-19

## 2023-04-19 RX ORDER — INSULIN GLARGINE 100 [IU]/ML
INJECTION, SOLUTION SUBCUTANEOUS
Qty: 30 ML | Refills: 0 | Status: SHIPPED | OUTPATIENT
Start: 2023-04-19

## 2023-04-19 RX ORDER — CHLORZOXAZONE 500 MG/1
TABLET ORAL
COMMUNITY
Start: 2023-03-15

## 2023-04-19 RX ORDER — ATENOLOL 50 MG/1
50 TABLET ORAL DAILY
Qty: 30 TABLET | Refills: 1 | Status: SHIPPED | OUTPATIENT
Start: 2023-04-19

## 2023-04-19 RX ORDER — LISINOPRIL 40 MG/1
40 TABLET ORAL DAILY
Qty: 90 TABLET | Refills: 1 | Status: SHIPPED | OUTPATIENT
Start: 2023-04-19

## 2023-04-19 RX ORDER — INSULIN LISPRO 100 [IU]/ML
20 INJECTION, SOLUTION INTRAVENOUS; SUBCUTANEOUS
Qty: 30 ML | Refills: 0 | Status: SHIPPED | OUTPATIENT
Start: 2023-04-19

## 2023-04-19 RX ORDER — BLOOD SUGAR DIAGNOSTIC
STRIP MISCELLANEOUS
Qty: 100 PEN NEEDLE | Refills: 2 | Status: SHIPPED | OUTPATIENT
Start: 2023-04-19

## 2023-04-19 RX ORDER — HYDROCHLOROTHIAZIDE 25 MG/1
TABLET ORAL
Qty: 90 TABLET | Refills: 0 | Status: SHIPPED | OUTPATIENT
Start: 2023-04-19

## 2023-04-19 NOTE — PATIENT INSTRUCTIONS
For Hypoglycemia:    If glucose levels under 80, please drink 4 oz of juice and recheck glucose in 15-20 minutes. Once glucose is above 80, then please eat food with protein/lipids and a carbohydrate. Think banana and PB, cheese stick and piece of fruit, toast with PB. Sliding scale:    Check sugar level prior to meals. If <100, then do use humalog  If 100-150, please use 10 units  If 150-200, please use 20 units  If over 200, please use 30 units.

## 2023-04-19 NOTE — PROGRESS NOTES
Room 6     Identified pt with two pt identifiers(name and ). Reviewed record in preparation for visit and have obtained necessary documentation. All patient medications has been reviewed. Chief Complaint   Patient presents with    Hypertension     Follow up    Diabetes     Follow up    Medication Refill       3 most recent PHQ Screens 2023   PHQ Not Done -   Little interest or pleasure in doing things Not at all   Feeling down, depressed, irritable, or hopeless Not at all   Total Score PHQ 2 0     Abuse Screening Questionnaire 2017   Do you ever feel afraid of your partner? N   Are you in a relationship with someone who physically or mentally threatens you? N   Is it safe for you to go home? Y       Health Maintenance Due   Topic    Hepatitis C Screening     Hepatitis B Vaccine (1 of 3 - Risk 3-dose series)    DTaP/Tdap/Td series (1 - Tdap)    Eye Exam Retinal or Dilated     COVID-19 Vaccine (3 - Booster for Pfizer series)    Colorectal Cancer Screening Combo     Foot Exam Q1     A1C test (Diabetic or Prediabetic)      Health Maintenance Review: Patient reminded of \"due or due soon\" health maintenance. I have asked the patient to contact his/her primary care provider (PCP) for follow-up on his/her health maintenance.     Vitals:    23 1455   BP: 121/80   Pulse: (!) 102   Resp: 20   Temp: 99.4 °F (37.4 °C)   TempSrc: Oral   SpO2: 95%   Weight: (!) 378 lb 6.4 oz (171.6 kg)   Height: 6' (1.829 m)   PainSc:   8   PainLoc: Back       Wt Readings from Last 3 Encounters:   23 (!) 378 lb 6.4 oz (171.6 kg)   03/10/23 (!) 388 lb (176 kg)   03/10/23 (!) 390 lb (176.9 kg)     Temp Readings from Last 3 Encounters:   23 99.4 °F (37.4 °C) (Oral)   22 100.1 °F (37.8 °C) (Oral)   22 100 °F (37.8 °C) (Oral)     BP Readings from Last 3 Encounters:   23 121/80   03/10/23 (!) 140/86   22 138/88     Pulse Readings from Last 3 Encounters:   23 (!) 102   22 (!) 110 11/29/22 (!) 123       1. \"Have you been to the ER, urgent care clinic since your last visit? Hospitalized since your last visit? \" No    2. \"Have you seen or consulted any other health care providers outside of the [address] [place] [address] since your last visit? \" Yes When: 2022 Where:  [facility]  Reason for visit: Pain management. 3. For patients aged 39-70: Has the patient had a colonoscopy / FIT/ Cologuard? No          Patient is accompanied by self I have received verbal consent from Sybil Rome to discuss any/all medical information while they are present in the room.

## 2023-04-19 NOTE — PROGRESS NOTES
HISTORY OF PRESENT ILLNESS  Neisha Goodrich is a 41 y.o. male presents for follow up on chronic conditions. Diabetes: Last A1c was   Lab Results   Component Value Date/Time    Hemoglobin A1c 9.6 (H) 11/29/2022 11:46 AM    Hemoglobin A1c (POC) 10.3 08/03/2021 01:14 PM     Patient brings in freestyle demetris meter, glucose levels ranging from  on meter. Patient is having low glucose episodes. Does not eat consistent meals, gives 50 units of humalog unless glucose level is under 100. Otherwise, gives 50 and is having hypoglycemia. Will eat rice when he has hypoglycemic episode and this will improve but then he has another episode later in the day. Experiences sweating, sweating during episodes. Hyperlipidemia: Mixed hyperlipidemia well controlled on crestor. Denies any leg cramps or malaise from this medication. Reported compliance with taking medication daily. Hypertension: Patients hypertension is well controlled on regimen of HCTZ, lisinopril, spironolactone. GERD:  Patient's acid reflux/GERD is well controlled on current regimen of  omeprazole. Chronic Headache disorder:  Well controlled on daily topomax. Chronic Pain: Secondary to lumbar radiculopathy. Is on tramadol and gabapentin with pain management. Starting physical therapy for this. Patient reported he does not want to be dependent on stronger medication. Recently had MRI, see results in mychart. Patient is having constipation from tramadol, using miralax with some improvement.          Vitals:    04/19/23 1455   BP: 121/80   Pulse: (!) 102   Resp: 20   Temp: 99.4 °F (37.4 °C)   TempSrc: Oral   SpO2: 95%   Weight: (!) 378 lb 6.4 oz (171.6 kg)   Height: 6' (1.829 m)     Patient Active Problem List   Diagnosis Code    HTN (hypertension) I10    Morbid obesity (HCC) E66.01    Chronic back pain M54.9, G89.29    Dyslipidemia, goal LDL below 100 E78.5    JENNIFER (obstructive sleep apnea) G47.33    Type 2 diabetes mellitus with hyperglycemia, with long-term current use of insulin ([facility]) E11.65, Z79.4    Recurrent depression ([facility]) F33.9    Carpal tunnel syndrome G56.00    Moderate episode of recurrent major depressive disorder ([facility] F33.1     Patient Active Problem List    Diagnosis Date Noted    Moderate episode of recurrent major depressive disorder ([facility] 11/29/2022    Carpal tunnel syndrome 04/28/2020    Recurrent depression ([facility] 12/21/2017    Type 2 diabetes mellitus with hyperglycemia, with long-term current use of insulin ([facility] 10/12/2015    JENNIFER (obstructive sleep apnea) 04/12/2012    Dyslipidemia, goal LDL below 100 07/01/2011    Chronic back pain 04/11/2011    HTN (hypertension) 03/18/2010    Morbid obesity ([facility] 03/18/2010     Current Outpatient Medications   Medication Sig Dispense Refill    gabapentin (NEURONTIN) 300 mg capsule TAKE 1 CAPSULE BY MOUTH ONCE DAILY FOR 3 DAYS THEN TAKE 1 CAPSULE TWICE DAILY FOR 2 DAYS THEN TAKE 1 CAPSULE THREE TIMES DAILY      chlorzoxazone (PARAFON FORTE) 500 mg tablet TAKE 1 TABLET BY MOUTH THREE TIMES DAILY AS NEEDED      traMADoL (ULTRAM) 50 mg tablet Take 2 Tablets by mouth two (2) times a day. insulin lispro (HUMALOG) 100 unit/mL kwikpen 20 Units by SubCUTAneous route Before breakfast, lunch, and dinner. Inject 50 units subcutaneously before breakfast, lunch and dinner. 30 mL 0    insulin glargine (LANTUS,BASAGLAR) 100 unit/mL (3 mL) inpn Inject 50 units subcutaneously twice daily 30 mL 0    empagliflozin (Jardiance) 25 mg tablet Take 1 Tablet by mouth daily. 90 Tablet 1    hydroCHLOROthiazide (HYDRODIURIL) 25 mg tablet TAKE 1 TABLET BY MOUTH ONCE DAILY . APPOINTMENT REQUIRED FOR FUTURE REFILLS 90 Tablet 0    lisinopriL (PRINIVIL, ZESTRIL) 40 mg tablet Take 1 Tablet by mouth daily. 90 Tablet 1    atenoloL (TENORMIN) 50 mg tablet Take 1 Tablet by mouth daily. 30 Tablet 1    dulaglutide (Trulicity) 3 EG/6.7 mL pnij 3 mg by SubCUTAneous route every seven (7) days.  4 Each 4 rosuvastatin (CRESTOR) 5 mg tablet Take 1 Tablet by mouth nightly. 90 Tablet 1    spironolactone (ALDACTONE) 25 mg tablet Take 1 Tablet by mouth daily. 90 Tablet 1    Cartia  mg capsule Take 1 Capsule by mouth daily. 90 Capsule 1    omeprazole (PRILOSEC) 20 mg capsule Take 1 Capsule by mouth daily. 30 Capsule 1    topiramate (TOPAMAX) 50 mg tablet Take 1 Tablet by mouth two (2) times a day. 180 Tablet 3    DULoxetine (CYMBALTA) 60 mg capsule Take 1 Capsule by mouth daily. 90 Capsule 1    Insulin Needles, Disposable, (BD Ultra-Fine Micro Pen Needle) 32 gauge x 1/4\" ndle USE 1 NEW PEN NEEDLE TO INJECT INSULIN SUBCUTANEOUSLY 4 TIMES DAILY 100 Pen Needle 2    polyethylene glycol (MIRALAX) 17 gram/dose powder Take 17 g by mouth daily as needed for Constipation. 1020 g 1    flash glucose sensor (FreeStyle Rasta 2 Sensor) kit Use as directed to monitor glucose every 14 days 6 Kit 3    diclofenac EC (VOLTAREN) 75 mg EC tablet Take 1 Tablet by mouth two (2) times a day. 60 Tablet 1    albuterol (PROVENTIL HFA, VENTOLIN HFA, PROAIR HFA) 90 mcg/actuation inhaler INHALE 1 PUFFS BY MOUTH EVERY 4 HOURS IF NEEDED FOR SHORTNESS OF BREATH 18 g 4    fexofenadine (ALLEGRA) 180 mg tablet Take 1 Tablet by mouth. Insulin Needles, Disposable, (BD Ultra-Fine Mini Pen Needle) 31 gauge x 3/16\" ndle USE FOR INSULIN INJECTION ONCE DAILY.  100 Pen Needle 5    OneTouch Delica Plus Lancet 33 gauge misc USE TO TEST BLOOD SUGAR THREE TIMES DAILY 100 Lancet 1    OneTouch Ultra Blue Test Strip strip USE TO TEST BLOOD GLUCOSE THREE TIMES DAILY 100 Strip 0    lancets (ONETOUCH DELICA LANCETS) 30 gauge misc USE TO TEST BLOOD SUGAR THREE TIMES DAILY 100 Lancet 3    diclofenac (VOLTAREN) 1 % gel APPLY 4 GRAMS TO AFFECTED AREA 5 TIMES A DAY IF NEEDED FOR ARTHRITIS      Blood-Glucose Meter monitoring kit Pt needs One Touch Ultra glucometer to test blood sugars three times daily E11.9 1 Kit 0    gabapentin (NEURONTIN) 600 mg tablet Take 1 Tablet by mouth three (3) times daily. (Patient not taking: Reported on 4/19/2023)      clobetasol (TEMOVATE) 0.05 % ointment Apply 1 Applicator to affected area daily. (Patient not taking: Reported on 4/19/2023)  0     Allergies   Allergen Reactions    Latex Hives    Clindamycin-Benzoyl Peroxide Rash    Guanfacine Other (comments)     Blood came out of nose and very dry mouth     Metformin Diarrhea     Past Medical History:   Diagnosis Date    Arthritis     Asthma     Chronic back pain 04/11/2011    Dr. Anabella Tristan - pain management    Depression     Diabetes ([facility]     Dyslipidemia     HTN (hypertension)     Morbid obesity ([facility]     JENNIFER (obstructive sleep apnea) 4/12/2012    Psychotic disorder ([facility]      Past Surgical History:   Procedure Laterality Date    HX ORTHOPAEDIC Right 2019    hand surgery    IR INJ EPIDURAL LUMBAR SACRAL       Family History   Problem Relation Age of Onset    Cancer Mother     Heart Disease Mother     OSTEOARTHRITIS Mother     Diabetes Mother     Coronary Art Dis Mother     Hypertension Father     Stroke Father      Social History     Tobacco Use    Smoking status: Never    Smokeless tobacco: Never   Substance Use Topics    Alcohol use: No           Review of Systems   Constitutional:  Negative for malaise/fatigue and weight loss. Eyes:  Negative for blurred vision and double vision. Respiratory:  Negative for cough and shortness of breath. Cardiovascular:  Negative for chest pain, palpitations and leg swelling. Gastrointestinal:  Negative for heartburn and nausea. Musculoskeletal:  Positive for back pain and myalgias. Negative for joint pain. Skin:  Negative for itching and rash. Neurological:  Negative for dizziness, tingling, loss of consciousness, weakness and headaches. Endo/Heme/Allergies:  Does not bruise/bleed easily. Psychiatric/Behavioral:  Negative for depression. The patient is not nervous/anxious. Physical Exam  Constitutional:       Appearance: Normal appearance.  He is obese. HENT:      Head: Normocephalic and atraumatic. Eyes:      Extraocular Movements: Extraocular movements intact. Conjunctiva/sclera: Conjunctivae normal.      Pupils: Pupils are equal, round, and reactive to light. Cardiovascular:      Rate and Rhythm: Normal rate and regular rhythm. Pulses: Normal pulses. Pulmonary:      Effort: Pulmonary effort is normal.      Breath sounds: Normal breath sounds. Musculoskeletal:         General: Normal range of motion. Skin:     General: Skin is warm and dry. Neurological:      General: No focal deficit present. Mental Status: He is alert and oriented to person, place, and time. Psychiatric:         Mood and Affect: Mood normal.         Behavior: Behavior normal.         ASSESSMENT and PLAN  Diagnoses and all orders for this visit:    1. Tachycardia  Comments:  stable on diltiazem. Saw Dr. Анна Bustamante cardiology and had normal work up per patient. Orders:  -     Cartia  mg capsule; Take 1 Capsule by mouth daily. 2. Type 2 diabetes mellitus with hyperglycemia, with long-term current use of insulin ([facility])  Comments:  glucose logs over past month with significant improvement. Education provided on hypoglycemic episodes (4 oz juice, then eat high protein/lipid w/carb)  Orders:  -     insulin lispro (HUMALOG) 100 unit/mL kwikpen; 20 Units by SubCUTAneous route Before breakfast, lunch, and dinner. Inject 50 units subcutaneously before breakfast, lunch and dinner.  -     insulin glargine (LANTUS,BASAGLAR) 100 unit/mL (3 mL) inpn; Inject 50 units subcutaneously twice daily  -     empagliflozin (Jardiance) 25 mg tablet; Take 1 Tablet by mouth daily. -     dulaglutide (Trulicity) 3 WM/0.7 mL pnij; 3 mg by SubCUTAneous route every seven (7) days. -     rosuvastatin (CRESTOR) 5 mg tablet; Take 1 Tablet by mouth nightly.   -     Insulin Needles, Disposable, (BD Ultra-Fine Micro Pen Needle) 32 gauge x 1/4\" ndle; USE 1 NEW PEN NEEDLE TO INJECT INSULIN SUBCUTANEOUSLY 4 TIMES DAILY  -     LIPID PANEL  -     HEMOGLOBIN A1C WITH EAG  -     CBC WITH AUTOMATED DIFF  -     METABOLIC PANEL, COMPREHENSIVE    3. Essential hypertension  -     hydroCHLOROthiazide (HYDRODIURIL) 25 mg tablet; TAKE 1 TABLET BY MOUTH ONCE DAILY . APPOINTMENT REQUIRED FOR FUTURE REFILLS  -     lisinopriL (PRINIVIL, ZESTRIL) 40 mg tablet; Take 1 Tablet by mouth daily. -     atenoloL (TENORMIN) 50 mg tablet; Take 1 Tablet by mouth daily. -     spironolactone (ALDACTONE) 25 mg tablet; Take 1 Tablet by mouth daily. -     Cartia  mg capsule; Take 1 Capsule by mouth daily. 4. New daily persistent headache  -     topiramate (TOPAMAX) 50 mg tablet; Take 1 Tablet by mouth two (2) times a day. 5. Moderate episode of recurrent major depressive disorder ([facility]  Comments:  continue cymbalta. Orders:  -     DULoxetine (CYMBALTA) 60 mg capsule; Take 1 Capsule by mouth daily. 6. Chronic bilateral low back pain with bilateral sciatica  Comments:  referral to pain management given. Orders:  -     DULoxetine (CYMBALTA) 60 mg capsule; Take 1 Capsule by mouth daily. 7. GERD without esophagitis  Comments:  well controlled on omeprazole  Orders:  -     omeprazole (PRILOSEC) 20 mg capsule; Take 1 Capsule by mouth daily. 8. Drug-induced constipation  Comments:  miralax, and increase water/fiber in diet. Orders:  -     polyethylene glycol (MIRALAX) 17 gram/dose powder; Take 17 g by mouth daily as needed for Constipation. On this date 4/19/2023 I have spent 40 minutes reviewing previous notes, test results and face to face with the patient discussing the diagnosis and plan of care as well as documenting on the day of the visit.       Aleksander Walker NP

## 2023-04-20 LAB
ALBUMIN SERPL-MCNC: 4.9 G/DL (ref 4–5)
ALBUMIN/GLOB SERPL: 1.8 {RATIO} (ref 1.2–2.2)
ALP SERPL-CCNC: 95 IU/L (ref 44–121)
ALT SERPL-CCNC: 20 IU/L (ref 0–44)
AST SERPL-CCNC: 14 IU/L (ref 0–40)
BASOPHILS # BLD AUTO: 0 X10E3/UL (ref 0–0.2)
BASOPHILS NFR BLD AUTO: 0 %
BILIRUB SERPL-MCNC: 0.3 MG/DL (ref 0–1.2)
BUN SERPL-MCNC: 14 MG/DL (ref 6–24)
BUN/CREAT SERPL: 13 (ref 9–20)
CALCIUM SERPL-MCNC: 10.1 MG/DL (ref 8.7–10.2)
CHLORIDE SERPL-SCNC: 101 MMOL/L (ref 96–106)
CHOLEST SERPL-MCNC: 196 MG/DL (ref 100–199)
CO2 SERPL-SCNC: 23 MMOL/L (ref 20–29)
CREAT SERPL-MCNC: 1.09 MG/DL (ref 0.76–1.27)
EGFRCR SERPLBLD CKD-EPI 2021: 85 ML/MIN/1.73
EOSINOPHIL # BLD AUTO: 0.1 X10E3/UL (ref 0–0.4)
EOSINOPHIL NFR BLD AUTO: 1 %
ERYTHROCYTE [DISTWIDTH] IN BLOOD BY AUTOMATED COUNT: 15.2 % (ref 11.6–15.4)
EST. AVERAGE GLUCOSE BLD GHB EST-MCNC: 180 MG/DL
GLOBULIN SER CALC-MCNC: 2.7 G/DL (ref 1.5–4.5)
GLUCOSE SERPL-MCNC: 121 MG/DL (ref 70–99)
HBA1C MFR BLD: 7.9 % (ref 4.8–5.6)
HCT VFR BLD AUTO: 49.5 % (ref 37.5–51)
HDLC SERPL-MCNC: 43 MG/DL
HGB BLD-MCNC: 16.6 G/DL (ref 13–17.7)
IMM GRANULOCYTES # BLD AUTO: 0.1 X10E3/UL (ref 0–0.1)
IMM GRANULOCYTES NFR BLD AUTO: 1 %
LDLC SERPL CALC-MCNC: 125 MG/DL (ref 0–99)
LYMPHOCYTES # BLD AUTO: 3.3 X10E3/UL (ref 0.7–3.1)
LYMPHOCYTES NFR BLD AUTO: 33 %
MCH RBC QN AUTO: 27.7 PG (ref 26.6–33)
MCHC RBC AUTO-ENTMCNC: 33.5 G/DL (ref 31.5–35.7)
MCV RBC AUTO: 83 FL (ref 79–97)
MONOCYTES # BLD AUTO: 0.9 X10E3/UL (ref 0.1–0.9)
MONOCYTES NFR BLD AUTO: 9 %
NEUTROPHILS # BLD AUTO: 5.7 X10E3/UL (ref 1.4–7)
NEUTROPHILS NFR BLD AUTO: 56 %
PLATELET # BLD AUTO: 347 X10E3/UL (ref 150–450)
POTASSIUM SERPL-SCNC: 3.9 MMOL/L (ref 3.5–5.2)
PROT SERPL-MCNC: 7.6 G/DL (ref 6–8.5)
RBC # BLD AUTO: 6 X10E6/UL (ref 4.14–5.8)
SODIUM SERPL-SCNC: 141 MMOL/L (ref 134–144)
TRIGL SERPL-MCNC: 158 MG/DL (ref 0–149)
VLDLC SERPL CALC-MCNC: 28 MG/DL (ref 5–40)
WBC # BLD AUTO: 10.1 X10E3/UL (ref 3.4–10.8)

## 2023-04-20 RX ORDER — ROSUVASTATIN CALCIUM 10 MG/1
10 TABLET, COATED ORAL
Qty: 90 TABLET | Refills: 1 | Status: SHIPPED | OUTPATIENT
Start: 2023-04-20

## 2023-04-22 DIAGNOSIS — M54.17 RADICULOPATHY, LUMBOSACRAL REGION: Primary | ICD-10-CM

## 2023-04-26 ENCOUNTER — TELEPHONE (OUTPATIENT)
Dept: PRIMARY CARE CLINIC | Age: 48
End: 2023-04-26

## 2023-04-26 DIAGNOSIS — E11.65 TYPE 2 DIABETES MELLITUS WITH HYPERGLYCEMIA, WITH LONG-TERM CURRENT USE OF INSULIN (HCC): ICD-10-CM

## 2023-04-26 DIAGNOSIS — Z79.4 TYPE 2 DIABETES MELLITUS WITH HYPERGLYCEMIA, WITH LONG-TERM CURRENT USE OF INSULIN (HCC): ICD-10-CM

## 2023-04-26 RX ORDER — INSULIN LISPRO 100 [IU]/ML
20 INJECTION, SOLUTION INTRAVENOUS; SUBCUTANEOUS
Qty: 30 ML | Refills: 0 | Status: SHIPPED | OUTPATIENT
Start: 2023-04-26

## 2023-04-26 NOTE — TELEPHONE ENCOUNTER
[phone] [facility] pharmacy needs clarification on this patients insulin script ( says has conflicting directions)

## 2023-05-30 RX ORDER — DICLOFENAC SODIUM 75 MG/1
TABLET, DELAYED RELEASE ORAL
Qty: 60 TABLET | Refills: 0 | Status: SHIPPED | OUTPATIENT
Start: 2023-05-30

## 2023-06-02 RX ORDER — POLYETHYLENE GLYCOL 3350 17 G/17G
POWDER, FOR SOLUTION ORAL
Refills: 0 | OUTPATIENT
Start: 2023-06-02

## 2023-06-06 RX ORDER — INSULIN LISPRO 100 [IU]/ML
INJECTION, SOLUTION INTRAVENOUS; SUBCUTANEOUS
Qty: 30 ML | Refills: 0 | Status: SHIPPED | OUTPATIENT
Start: 2023-06-06

## 2023-06-06 RX ORDER — INSULIN GLARGINE-YFGN 100 [IU]/ML
INJECTION, SOLUTION SUBCUTANEOUS
Qty: 30 ML | Refills: 0 | OUTPATIENT
Start: 2023-06-06

## 2023-06-08 RX ORDER — INSULIN GLARGINE 100 [IU]/ML
INJECTION, SOLUTION SUBCUTANEOUS
Qty: 30 ML | Refills: 0 | Status: SHIPPED | OUTPATIENT
Start: 2023-06-08

## 2023-07-05 RX ORDER — OMEPRAZOLE 20 MG/1
CAPSULE, DELAYED RELEASE ORAL
Qty: 30 CAPSULE | Refills: 0 | Status: SHIPPED | OUTPATIENT
Start: 2023-07-05

## 2023-07-05 RX ORDER — DICLOFENAC SODIUM 75 MG/1
TABLET, DELAYED RELEASE ORAL
Qty: 60 TABLET | Refills: 0 | Status: SHIPPED | OUTPATIENT
Start: 2023-07-05

## 2023-07-05 RX ORDER — ATENOLOL 50 MG/1
TABLET ORAL
Qty: 30 TABLET | Refills: 0 | Status: SHIPPED | OUTPATIENT
Start: 2023-07-05

## 2023-07-05 NOTE — TELEPHONE ENCOUNTER
Called patient to set up appointment. Patient states his mother  in November and he's been doing pretty rough. Ran out of insulin for 1 week. Will set up a phone visit for next Thursday.     Antonia Zuluaga, PharmD, BCPS, [facility]    CLINICAL PHARMACY CONSULT: MED RECONCILIATION/REVIEW ADDENDUM    For Pharmacy Admin Tracking Only    PHSO: PHSO Patient?: No  Total # of Interventions Recommended: Count: 1  Total Interventions Accepted: 1  Time Spent (min): 5 Quality 431: Preventive Care And Screening: Unhealthy Alcohol Use - Screening: Patient not identified as an unhealthy alcohol user when screened for unhealthy alcohol use using a systematic screening method Detail Level: Detailed Quality 226: Preventive Care And Screening: Tobacco Use: Screening And Cessation Intervention: Patient screened for tobacco use and is an ex/non-smoker Quality 130: Documentation Of Current Medications In The Medical Record: Current Medications Documented

## 2023-08-03 RX ORDER — INSULIN LISPRO 100 [IU]/ML
INJECTION, SOLUTION INTRAVENOUS; SUBCUTANEOUS
Qty: 30 ML | Refills: 3 | Status: SHIPPED | OUTPATIENT
Start: 2023-08-03

## 2023-08-08 RX ORDER — POLYETHYLENE GLYCOL 3350 17 G/17G
POWDER, FOR SOLUTION ORAL
Refills: 0 | OUTPATIENT
Start: 2023-08-08

## 2023-08-08 RX ORDER — INSULIN GLARGINE 100 [IU]/ML
INJECTION, SOLUTION SUBCUTANEOUS
Qty: 30 ML | Refills: 0 | Status: SHIPPED | OUTPATIENT
Start: 2023-08-08

## 2023-09-05 RX ORDER — ATENOLOL 50 MG/1
TABLET ORAL
Qty: 30 TABLET | Refills: 0 | Status: SHIPPED | OUTPATIENT
Start: 2023-09-05

## 2023-09-05 RX ORDER — OMEPRAZOLE 20 MG/1
CAPSULE, DELAYED RELEASE ORAL
Qty: 30 CAPSULE | Refills: 0 | Status: SHIPPED | OUTPATIENT
Start: 2023-09-05

## 2023-09-05 RX ORDER — DICLOFENAC SODIUM 75 MG/1
TABLET, DELAYED RELEASE ORAL
Qty: 60 TABLET | Refills: 0 | Status: SHIPPED | OUTPATIENT
Start: 2023-09-05

## 2023-10-03 RX ORDER — PEN NEEDLE, DIABETIC 32 GX 1/4"
NEEDLE, DISPOSABLE MISCELLANEOUS
Qty: 100 EACH | Refills: 0 | Status: SHIPPED | OUTPATIENT
Start: 2023-10-03

## 2023-10-07 RX ORDER — INSULIN GLARGINE-YFGN 100 [IU]/ML
INJECTION, SOLUTION SUBCUTANEOUS
Qty: 30 ML | Refills: 0 | Status: SHIPPED | OUTPATIENT
Start: 2023-10-07

## 2023-10-09 RX ORDER — SPIRONOLACTONE 25 MG/1
25 TABLET ORAL DAILY
Qty: 90 TABLET | Refills: 0 | Status: SHIPPED | OUTPATIENT
Start: 2023-10-09 | End: 2023-11-20 | Stop reason: SDUPTHER

## 2023-10-09 RX ORDER — DICLOFENAC SODIUM 75 MG/1
TABLET, DELAYED RELEASE ORAL
Qty: 60 TABLET | Refills: 0 | Status: SHIPPED | OUTPATIENT
Start: 2023-10-09 | End: 2023-11-20

## 2023-10-09 RX ORDER — EMPAGLIFLOZIN 25 MG/1
25 TABLET, FILM COATED ORAL DAILY
Qty: 90 TABLET | Refills: 0 | Status: SHIPPED | OUTPATIENT
Start: 2023-10-09 | End: 2023-11-20 | Stop reason: SDUPTHER

## 2023-10-09 RX ORDER — ATENOLOL 50 MG/1
TABLET ORAL
Qty: 90 TABLET | Refills: 0 | Status: SHIPPED | OUTPATIENT
Start: 2023-10-09 | End: 2023-11-20 | Stop reason: SDUPTHER

## 2023-11-18 SDOH — ECONOMIC STABILITY: INCOME INSECURITY: HOW HARD IS IT FOR YOU TO PAY FOR THE VERY BASICS LIKE FOOD, HOUSING, MEDICAL CARE, AND HEATING?: NOT VERY HARD

## 2023-11-18 SDOH — ECONOMIC STABILITY: TRANSPORTATION INSECURITY
IN THE PAST 12 MONTHS, HAS LACK OF TRANSPORTATION KEPT YOU FROM MEETINGS, WORK, OR FROM GETTING THINGS NEEDED FOR DAILY LIVING?: NO

## 2023-11-18 SDOH — ECONOMIC STABILITY: FOOD INSECURITY: WITHIN THE PAST 12 MONTHS, YOU WORRIED THAT YOUR FOOD WOULD RUN OUT BEFORE YOU GOT MONEY TO BUY MORE.: OFTEN TRUE

## 2023-11-18 SDOH — ECONOMIC STABILITY: FOOD INSECURITY: WITHIN THE PAST 12 MONTHS, THE FOOD YOU BOUGHT JUST DIDN'T LAST AND YOU DIDN'T HAVE MONEY TO GET MORE.: OFTEN TRUE

## 2023-11-18 SDOH — ECONOMIC STABILITY: HOUSING INSECURITY
IN THE LAST 12 MONTHS, WAS THERE A TIME WHEN YOU DID NOT HAVE A STEADY PLACE TO SLEEP OR SLEPT IN A SHELTER (INCLUDING NOW)?: NO

## 2023-11-20 ENCOUNTER — OFFICE VISIT (OUTPATIENT)
Dept: PRIMARY CARE CLINIC | Facility: CLINIC | Age: 48
End: 2023-11-20
Payer: MEDICAID

## 2023-11-20 VITALS
HEIGHT: 72 IN | RESPIRATION RATE: 16 BRPM | WEIGHT: 315 LBS | BODY MASS INDEX: 42.66 KG/M2 | TEMPERATURE: 98.2 F | DIASTOLIC BLOOD PRESSURE: 81 MMHG | OXYGEN SATURATION: 98 % | SYSTOLIC BLOOD PRESSURE: 138 MMHG | HEART RATE: 97 BPM

## 2023-11-20 DIAGNOSIS — R25.2 MUSCLE CRAMPS: ICD-10-CM

## 2023-11-20 DIAGNOSIS — R00.0 TACHYCARDIA, UNSPECIFIED: ICD-10-CM

## 2023-11-20 DIAGNOSIS — K21.9 GASTRO-ESOPHAGEAL REFLUX DISEASE WITHOUT ESOPHAGITIS: ICD-10-CM

## 2023-11-20 DIAGNOSIS — I10 ESSENTIAL (PRIMARY) HYPERTENSION: ICD-10-CM

## 2023-11-20 DIAGNOSIS — G44.52 NEW DAILY PERSISTENT HEADACHE (NDPH): ICD-10-CM

## 2023-11-20 DIAGNOSIS — F33.1 MAJOR DEPRESSIVE DISORDER, RECURRENT, MODERATE (HCC): ICD-10-CM

## 2023-11-20 DIAGNOSIS — E78.5 DYSLIPIDEMIA, GOAL LDL BELOW 100: ICD-10-CM

## 2023-11-20 DIAGNOSIS — Z79.4 TYPE 2 DIABETES MELLITUS WITH HYPERGLYCEMIA, WITH LONG-TERM CURRENT USE OF INSULIN (HCC): ICD-10-CM

## 2023-11-20 DIAGNOSIS — B37.9 CANDIDIASIS: Primary | ICD-10-CM

## 2023-11-20 DIAGNOSIS — M75.41 IMPINGEMENT SYNDROME OF RIGHT SHOULDER: ICD-10-CM

## 2023-11-20 DIAGNOSIS — E11.65 TYPE 2 DIABETES MELLITUS WITH HYPERGLYCEMIA, WITH LONG-TERM CURRENT USE OF INSULIN (HCC): ICD-10-CM

## 2023-11-20 PROCEDURE — 99215 OFFICE O/P EST HI 40 MIN: CPT | Performed by: NURSE PRACTITIONER

## 2023-11-20 PROCEDURE — 3079F DIAST BP 80-89 MM HG: CPT | Performed by: NURSE PRACTITIONER

## 2023-11-20 PROCEDURE — 3051F HG A1C>EQUAL 7.0%<8.0%: CPT | Performed by: NURSE PRACTITIONER

## 2023-11-20 PROCEDURE — 3075F SYST BP GE 130 - 139MM HG: CPT | Performed by: NURSE PRACTITIONER

## 2023-11-20 RX ORDER — TOPIRAMATE 50 MG/1
50 TABLET, FILM COATED ORAL 2 TIMES DAILY
Qty: 180 TABLET | Refills: 1 | Status: SHIPPED | OUTPATIENT
Start: 2023-11-20

## 2023-11-20 RX ORDER — DULAGLUTIDE 3 MG/.5ML
3 INJECTION, SOLUTION SUBCUTANEOUS
Qty: 12 ADJUSTABLE DOSE PRE-FILLED PEN SYRINGE | Refills: 1 | Status: SHIPPED | OUTPATIENT
Start: 2023-11-20

## 2023-11-20 RX ORDER — INSULIN GLARGINE 100 [IU]/ML
INJECTION, SOLUTION SUBCUTANEOUS
Qty: 30 ML | Refills: 1 | Status: SHIPPED | OUTPATIENT
Start: 2023-11-20

## 2023-11-20 RX ORDER — INSULIN LISPRO 100 [IU]/ML
INJECTION, SOLUTION INTRAVENOUS; SUBCUTANEOUS
Qty: 30 ML | Refills: 3 | Status: SHIPPED | OUTPATIENT
Start: 2023-11-20

## 2023-11-20 RX ORDER — NYSTATIN 100000 U/G
OINTMENT TOPICAL
Qty: 30 G | Refills: 2 | Status: SHIPPED | OUTPATIENT
Start: 2023-11-20

## 2023-11-20 RX ORDER — CHLORTHALIDONE 25 MG/1
12.5 TABLET ORAL
COMMUNITY
Start: 2017-09-19 | End: 2023-11-20

## 2023-11-20 RX ORDER — ROSUVASTATIN CALCIUM 10 MG/1
TABLET, COATED ORAL
COMMUNITY
Start: 2023-11-19 | End: 2023-11-20 | Stop reason: SDUPTHER

## 2023-11-20 RX ORDER — DILTIAZEM HYDROCHLORIDE 240 MG/1
240 CAPSULE, COATED, EXTENDED RELEASE ORAL DAILY
Qty: 90 CAPSULE | Refills: 1 | Status: SHIPPED | OUTPATIENT
Start: 2023-11-20

## 2023-11-20 RX ORDER — OMEPRAZOLE 20 MG/1
CAPSULE, DELAYED RELEASE ORAL
Qty: 30 CAPSULE | Refills: 0 | OUTPATIENT
Start: 2023-11-20

## 2023-11-20 RX ORDER — GLIPIZIDE 5 MG/1
1 TABLET, FILM COATED, EXTENDED RELEASE ORAL DAILY
COMMUNITY
Start: 2017-06-16 | End: 2023-11-20

## 2023-11-20 RX ORDER — SPIRONOLACTONE 25 MG/1
25 TABLET ORAL DAILY
Qty: 90 TABLET | Refills: 0 | Status: SHIPPED | OUTPATIENT
Start: 2023-11-20

## 2023-11-20 RX ORDER — PEN NEEDLE, DIABETIC 32 GX 1/4"
NEEDLE, DISPOSABLE MISCELLANEOUS
Qty: 100 EACH | Refills: 0 | OUTPATIENT
Start: 2023-11-20

## 2023-11-20 RX ORDER — DULOXETIN HYDROCHLORIDE 60 MG/1
60 CAPSULE, DELAYED RELEASE ORAL DAILY
Qty: 90 CAPSULE | Refills: 1 | Status: SHIPPED | OUTPATIENT
Start: 2023-11-20

## 2023-11-20 RX ORDER — GABAPENTIN 300 MG/1
CAPSULE ORAL
COMMUNITY
Start: 2023-10-06 | End: 2023-11-20

## 2023-11-20 RX ORDER — ROSUVASTATIN CALCIUM 10 MG/1
10 TABLET, COATED ORAL DAILY
Qty: 90 TABLET | Refills: 1 | Status: SHIPPED | OUTPATIENT
Start: 2023-11-20

## 2023-11-20 RX ORDER — PREGABALIN 75 MG/1
CAPSULE ORAL
COMMUNITY
Start: 2023-11-03

## 2023-11-20 RX ORDER — ATENOLOL 50 MG/1
50 TABLET ORAL DAILY
Qty: 90 TABLET | Refills: 1 | Status: SHIPPED | OUTPATIENT
Start: 2023-11-20

## 2023-11-20 RX ORDER — SIMVASTATIN 20 MG
1 TABLET ORAL EVERY EVENING
COMMUNITY
Start: 2017-06-06 | End: 2023-11-20

## 2023-11-20 RX ORDER — HYDROCHLOROTHIAZIDE 25 MG/1
TABLET ORAL
Qty: 90 TABLET | Refills: 1 | Status: SHIPPED | OUTPATIENT
Start: 2023-11-20

## 2023-11-20 RX ORDER — PEN NEEDLE, DIABETIC 32 GX 1/4"
NEEDLE, DISPOSABLE MISCELLANEOUS
Qty: 100 EACH | Refills: 5 | Status: SHIPPED | OUTPATIENT
Start: 2023-11-20

## 2023-11-20 RX ORDER — NYSTATIN 100000 [USP'U]/G
POWDER TOPICAL
Qty: 60 G | Refills: 3 | Status: SHIPPED | OUTPATIENT
Start: 2023-11-20

## 2023-11-20 RX ORDER — HYDROCHLOROTHIAZIDE 25 MG/1
TABLET ORAL
Qty: 90 TABLET | Refills: 0 | OUTPATIENT
Start: 2023-11-20

## 2023-11-20 RX ORDER — OMEPRAZOLE 20 MG/1
20 CAPSULE, DELAYED RELEASE ORAL DAILY
Qty: 90 CAPSULE | Refills: 0 | Status: SHIPPED | OUTPATIENT
Start: 2023-11-20

## 2023-11-20 RX ORDER — DICLOFENAC SODIUM 75 MG/1
75 TABLET, DELAYED RELEASE ORAL 2 TIMES DAILY
Qty: 60 TABLET | Refills: 3 | Status: SHIPPED | OUTPATIENT
Start: 2023-11-20

## 2023-11-20 ASSESSMENT — PATIENT HEALTH QUESTIONNAIRE - PHQ9
1. LITTLE INTEREST OR PLEASURE IN DOING THINGS: 0
SUM OF ALL RESPONSES TO PHQ QUESTIONS 1-9: 0
2. FEELING DOWN, DEPRESSED OR HOPELESS: 0
SUM OF ALL RESPONSES TO PHQ QUESTIONS 1-9: 0
SUM OF ALL RESPONSES TO PHQ9 QUESTIONS 1 & 2: 0

## 2023-11-20 ASSESSMENT — ENCOUNTER SYMPTOMS
BACK PAIN: 1
SHORTNESS OF BREATH: 0

## 2023-11-21 LAB
ALBUMIN SERPL-MCNC: 4.4 G/DL (ref 4.1–5.1)
ALBUMIN/CREAT UR: 6 MG/G CREAT (ref 0–29)
ALBUMIN/GLOB SERPL: 1.7 {RATIO} (ref 1.2–2.2)
ALP SERPL-CCNC: 84 IU/L (ref 44–121)
ALT SERPL-CCNC: 22 IU/L (ref 0–44)
AST SERPL-CCNC: 16 IU/L (ref 0–40)
BASOPHILS # BLD AUTO: 0.1 X10E3/UL (ref 0–0.2)
BASOPHILS NFR BLD AUTO: 1 %
BILIRUB SERPL-MCNC: 0.4 MG/DL (ref 0–1.2)
BUN SERPL-MCNC: 8 MG/DL (ref 6–24)
BUN/CREAT SERPL: 9 (ref 9–20)
CALCIUM SERPL-MCNC: 9.4 MG/DL (ref 8.7–10.2)
CHLORIDE SERPL-SCNC: 101 MMOL/L (ref 96–106)
CHOLEST SERPL-MCNC: 153 MG/DL (ref 100–199)
CO2 SERPL-SCNC: 24 MMOL/L (ref 20–29)
CREAT SERPL-MCNC: 0.86 MG/DL (ref 0.76–1.27)
CREAT UR-MCNC: 156.8 MG/DL
EGFRCR SERPLBLD CKD-EPI 2021: 108 ML/MIN/1.73
EOSINOPHIL # BLD AUTO: 0.1 X10E3/UL (ref 0–0.4)
EOSINOPHIL NFR BLD AUTO: 2 %
ERYTHROCYTE [DISTWIDTH] IN BLOOD BY AUTOMATED COUNT: 14.7 % (ref 11.6–15.4)
GLOBULIN SER CALC-MCNC: 2.6 G/DL (ref 1.5–4.5)
GLUCOSE SERPL-MCNC: 108 MG/DL (ref 70–99)
HBA1C MFR BLD: 7.7 % (ref 4.8–5.6)
HCT VFR BLD AUTO: 49.2 % (ref 37.5–51)
HDLC SERPL-MCNC: 41 MG/DL
HGB BLD-MCNC: 15.8 G/DL (ref 13–17.7)
IMM GRANULOCYTES # BLD AUTO: 0 X10E3/UL (ref 0–0.1)
IMM GRANULOCYTES NFR BLD AUTO: 0 %
LDLC SERPL CALC-MCNC: 94 MG/DL (ref 0–99)
LYMPHOCYTES # BLD AUTO: 3.1 X10E3/UL (ref 0.7–3.1)
LYMPHOCYTES NFR BLD AUTO: 38 %
MAGNESIUM SERPL-MCNC: 2.2 MG/DL (ref 1.6–2.3)
MCH RBC QN AUTO: 27.2 PG (ref 26.6–33)
MCHC RBC AUTO-ENTMCNC: 32.1 G/DL (ref 31.5–35.7)
MCV RBC AUTO: 85 FL (ref 79–97)
MICROALBUMIN UR-MCNC: 9.6 UG/ML
MONOCYTES # BLD AUTO: 0.7 X10E3/UL (ref 0.1–0.9)
MONOCYTES NFR BLD AUTO: 8 %
NEUTROPHILS # BLD AUTO: 4.2 X10E3/UL (ref 1.4–7)
NEUTROPHILS NFR BLD AUTO: 51 %
PLATELET # BLD AUTO: 320 X10E3/UL (ref 150–450)
POTASSIUM SERPL-SCNC: 4.1 MMOL/L (ref 3.5–5.2)
PROT SERPL-MCNC: 7 G/DL (ref 6–8.5)
RBC # BLD AUTO: 5.81 X10E6/UL (ref 4.14–5.8)
SODIUM SERPL-SCNC: 140 MMOL/L (ref 134–144)
TRIGL SERPL-MCNC: 95 MG/DL (ref 0–149)
VIT B12 SERPL-MCNC: 832 PG/ML (ref 232–1245)
VLDLC SERPL CALC-MCNC: 18 MG/DL (ref 5–40)
WBC # BLD AUTO: 8.1 X10E3/UL (ref 3.4–10.8)

## 2023-11-29 ENCOUNTER — TELEPHONE (OUTPATIENT)
Age: 48
End: 2023-11-29

## 2023-11-29 NOTE — TELEPHONE ENCOUNTER
Pt called and stated he is unable to fill out New Patient Forms through email. Asked to get his forms mailed,    Mailed NP Forms to address on file - pt verified address    Stated he will drop them off when he fills them out.

## 2024-01-14 ENCOUNTER — APPOINTMENT (OUTPATIENT)
Facility: HOSPITAL | Age: 49
End: 2024-01-14
Payer: MEDICAID

## 2024-01-14 ENCOUNTER — HOSPITAL ENCOUNTER (EMERGENCY)
Facility: HOSPITAL | Age: 49
Discharge: HOME | End: 2024-01-14
Attending: EMERGENCY MEDICINE
Payer: MEDICAID

## 2024-01-14 VITALS
TEMPERATURE: 98.2 F | OXYGEN SATURATION: 98 % | WEIGHT: 315 LBS | RESPIRATION RATE: 16 BRPM | HEIGHT: 72 IN | DIASTOLIC BLOOD PRESSURE: 100 MMHG | SYSTOLIC BLOOD PRESSURE: 143 MMHG | HEART RATE: 103 BPM | BODY MASS INDEX: 42.66 KG/M2

## 2024-01-14 DIAGNOSIS — J02.9 ACUTE PHARYNGITIS, UNSPECIFIED ETIOLOGY: Primary | ICD-10-CM

## 2024-01-14 DIAGNOSIS — J06.9 UPPER RESPIRATORY TRACT INFECTION, UNSPECIFIED TYPE: ICD-10-CM

## 2024-01-14 LAB
DEPRECATED S PYO AG THROAT QL EIA: NEGATIVE
FLUAV AG NPH QL IA: NEGATIVE
FLUBV AG NOSE QL IA: NEGATIVE
SARS-COV-2 RDRP RESP QL NAA+PROBE: NOT DETECTED
SOURCE: NORMAL

## 2024-01-14 PROCEDURE — 87804 INFLUENZA ASSAY W/OPTIC: CPT

## 2024-01-14 PROCEDURE — 87635 SARS-COV-2 COVID-19 AMP PRB: CPT

## 2024-01-14 PROCEDURE — 71046 X-RAY EXAM CHEST 2 VIEWS: CPT

## 2024-01-14 PROCEDURE — 87070 CULTURE OTHR SPECIMN AEROBIC: CPT

## 2024-01-14 PROCEDURE — 99284 EMERGENCY DEPT VISIT MOD MDM: CPT

## 2024-01-14 PROCEDURE — 87880 STREP A ASSAY W/OPTIC: CPT

## 2024-01-14 ASSESSMENT — PAIN DESCRIPTION - DESCRIPTORS: DESCRIPTORS: SORE

## 2024-01-14 ASSESSMENT — PAIN - FUNCTIONAL ASSESSMENT: PAIN_FUNCTIONAL_ASSESSMENT: 0-10

## 2024-01-14 ASSESSMENT — PAIN DESCRIPTION - LOCATION: LOCATION: THROAT

## 2024-01-14 ASSESSMENT — PAIN SCALES - GENERAL: PAINLEVEL_OUTOF10: 4

## 2024-01-14 NOTE — ED NOTES
Patient given discharge instructions per provider and verbalized understanding. Patient discharged prior to RN assessment

## 2024-01-14 NOTE — DISCHARGE INSTRUCTIONS
Throw out your toothbrush and start fresh with a new toothbrush.  Increase your fluids to 8 ounces every hour that you are awake; include salty liquids such as soups, broths, seltzer water and Gatorade alternating with water.  May gargle with Listerine or warm salt water solution.  Follow-up with ENT if symptoms persist.  Throat culture pending.

## 2024-01-14 NOTE — ED PROVIDER NOTES
Never   Substance and Sexual Activity    Alcohol use: Never    Drug use: Never    Sexual activity: Never           PHYSICAL EXAM    (up to 7 for level 4, 8 or more for level 5)     ED Triage Vitals [01/14/24 1059]   BP Temp Temp Source Pulse Respirations SpO2 Height Weight - Scale   (!) 155/98 98.3 °F (36.8 °C) Oral (!) 120 18 96 % 1.829 m (6') (!) 174.6 kg (385 lb)       Body mass index is 52.22 kg/m².    Physical Exam  Vitals and nursing note reviewed.   Constitutional:       Appearance: He is well-developed. He is obese.      Comments: Morbidly obese male, non-smoker   HENT:      Head: Normocephalic.      Right Ear: Tympanic membrane normal.      Left Ear: Tympanic membrane normal.      Mouth/Throat:      Mouth: Mucous membranes are moist.      Pharynx: Uvula midline. Pharyngeal swelling, oropharyngeal exudate and posterior oropharyngeal erythema present.      Tonsils: Tonsillar exudate present. No tonsillar abscesses. 1+ on the right. 1+ on the left.      Comments: Slight ridging of the tongue noted; patchy exudate on left tonsil  Cardiovascular:      Rate and Rhythm: Regular rhythm. Tachycardia present.   Pulmonary:      Effort: Pulmonary effort is normal.      Breath sounds: Normal breath sounds.   Chest:      Chest wall: No tenderness.   Musculoskeletal:      Cervical back: Normal range of motion and neck supple.   Lymphadenopathy:      Cervical: Cervical adenopathy present.   Skin:     General: Skin is warm and dry.      Findings: No erythema or rash.   Neurological:      Mental Status: He is alert.         DIAGNOSTIC RESULTS     EKG: All EKG's are interpreted by the Emergency Department Physician who either signs or Co-signs this chart in the absence of a cardiologist.        RADIOLOGY:   Non-plain film images such as CT, Ultrasound and MRI are read by the radiologist. Plain radiographic images are visualized and preliminarily interpreted by the emergency physician with the below

## 2024-01-16 ENCOUNTER — OFFICE VISIT (OUTPATIENT)
Dept: PRIMARY CARE CLINIC | Facility: CLINIC | Age: 49
End: 2024-01-16
Payer: MEDICAID

## 2024-01-16 VITALS
SYSTOLIC BLOOD PRESSURE: 138 MMHG | DIASTOLIC BLOOD PRESSURE: 84 MMHG | RESPIRATION RATE: 16 BRPM | WEIGHT: 315 LBS | OXYGEN SATURATION: 98 % | TEMPERATURE: 98.8 F | HEART RATE: 90 BPM | HEIGHT: 72 IN | BODY MASS INDEX: 42.66 KG/M2

## 2024-01-16 DIAGNOSIS — J02.9 PHARYNGITIS, UNSPECIFIED ETIOLOGY: ICD-10-CM

## 2024-01-16 DIAGNOSIS — R50.9 FEVER, UNSPECIFIED FEVER CAUSE: Primary | ICD-10-CM

## 2024-01-16 DIAGNOSIS — K13.29 WHITE PATCHES ON ORAL MUCOSA: ICD-10-CM

## 2024-01-16 LAB
BACTERIA SPEC CULT: NORMAL
SERVICE CMNT-IMP: NORMAL

## 2024-01-16 PROCEDURE — 99213 OFFICE O/P EST LOW 20 MIN: CPT | Performed by: NURSE PRACTITIONER

## 2024-01-16 PROCEDURE — 3075F SYST BP GE 130 - 139MM HG: CPT | Performed by: NURSE PRACTITIONER

## 2024-01-16 PROCEDURE — 3079F DIAST BP 80-89 MM HG: CPT | Performed by: NURSE PRACTITIONER

## 2024-01-16 ASSESSMENT — PATIENT HEALTH QUESTIONNAIRE - PHQ9
2. FEELING DOWN, DEPRESSED OR HOPELESS: 0
SUM OF ALL RESPONSES TO PHQ QUESTIONS 1-9: 0
1. LITTLE INTEREST OR PLEASURE IN DOING THINGS: 0
SUM OF ALL RESPONSES TO PHQ QUESTIONS 1-9: 0
SUM OF ALL RESPONSES TO PHQ9 QUESTIONS 1 & 2: 0

## 2024-01-16 ASSESSMENT — ENCOUNTER SYMPTOMS
GASTROINTESTINAL NEGATIVE: 1
SORE THROAT: 1
COUGH: 0
WHEEZING: 0

## 2024-01-16 NOTE — PROGRESS NOTES
Queenie Hurley is a 48 y.o. male who was seen in clinic today (1/16/2024).    Assessment & Plan:   Below is the assessment and plan developed based on review of pertinent history, physical exam, labs, studies, and medications.    1. Fever, unspecified fever cause  Comments:  concern for mono.  Will check labs.  if mono present, discussed treatment.  if negative, will consider abx treatment  Orders:  -     MONONUCLEOSIS SCREEN  2. White patches on oral mucosa  -     MONONUCLEOSIS SCREEN      No follow-ups on file.    Subjective:   Queenie was seen today for Pharyngitis and Dizziness     Patient c/o sore throat, fatigued,dizziness and chills x3 week.   Intermittent fevers x2 weeks.  Having low appetite.  Patient notes he had white patches on his throat which is improving but he has white on left tonsil.     Was seen in at the ER on 1/14.  Had negative strep, covid, flu testing.  Dx with viral pharyngitis and advised supportive treatment.  Patient notes that symptoms have persisted and continues to be dizzy.     Review of Systems   Constitutional:  Positive for chills, fatigue and fever.   HENT:  Positive for sore throat.    Respiratory:  Negative for cough and wheezing.    Gastrointestinal: Negative.    Musculoskeletal:  Positive for myalgias.   Neurological:  Positive for dizziness. Negative for headaches.          Objective:     Vitals:    01/16/24 1249   BP: 138/84   Site: Right Upper Arm   Position: Sitting   Pulse: 90   Resp: 16   Temp: 98.8 °F (37.1 °C)   TempSrc: Oral   SpO2: 98%   Weight: (!) 175.1 kg (386 lb)   Height: 1.829 m (6')      Body mass index is 52.35 kg/m².     Physical Exam  Constitutional:       Appearance: Normal appearance. He is obese.   HENT:      Head: Normocephalic.      Right Ear: Tympanic membrane, ear canal and external ear normal.      Left Ear: Tympanic membrane, ear canal and external ear normal.      Nose: No congestion.      Mouth/Throat:      Pharynx: Oropharyngeal exudate and

## 2024-01-16 NOTE — PROGRESS NOTES
Chief Complaint   Patient presents with    Pharyngitis    Dizziness     /84 (Site: Right Upper Arm, Position: Sitting)   Pulse 90   Temp 98.8 °F (37.1 °C) (Oral)   Resp 16   Ht 1.829 m (6')   Wt (!) 175.1 kg (386 lb)   SpO2 98%   BMI 52.35 kg/m²     \"Have you been to the ER, urgent care clinic since your last visit?  Hospitalized since your last visit?\"    NO    “Have you seen or consulted any other health care providers outside of [facility] since your last visit?”    NO    “Have you had a colorectal cancer screening such as a colonoscopy/FIT/Cologuard?    NO

## 2024-01-17 LAB — HETEROPH AB SER QL LA: NEGATIVE

## 2024-01-17 RX ORDER — AMOXICILLIN AND CLAVULANATE POTASSIUM 875; 125 MG/1; MG/1
1 TABLET, FILM COATED ORAL 2 TIMES DAILY
Qty: 20 TABLET | Refills: 0 | Status: SHIPPED | OUTPATIENT
Start: 2024-01-17 | End: 2024-01-27

## 2024-01-25 DIAGNOSIS — K21.9 GASTRO-ESOPHAGEAL REFLUX DISEASE WITHOUT ESOPHAGITIS: ICD-10-CM

## 2024-01-25 RX ORDER — OMEPRAZOLE 20 MG/1
20 CAPSULE, DELAYED RELEASE ORAL DAILY
Qty: 90 CAPSULE | Refills: 0 | Status: SHIPPED | OUTPATIENT
Start: 2024-01-25

## 2024-02-07 ENCOUNTER — OFFICE VISIT (OUTPATIENT)
Age: 49
End: 2024-02-07
Payer: MEDICAID

## 2024-02-07 VITALS
DIASTOLIC BLOOD PRESSURE: 94 MMHG | HEIGHT: 72 IN | OXYGEN SATURATION: 98 % | RESPIRATION RATE: 20 BRPM | SYSTOLIC BLOOD PRESSURE: 148 MMHG | TEMPERATURE: 99 F | HEART RATE: 96 BPM | WEIGHT: 315 LBS | BODY MASS INDEX: 42.66 KG/M2

## 2024-02-07 DIAGNOSIS — E66.01 MORBID OBESITY (HCC): Primary | ICD-10-CM

## 2024-02-07 DIAGNOSIS — I10 PRIMARY HYPERTENSION: ICD-10-CM

## 2024-02-07 DIAGNOSIS — E11.65 TYPE 2 DIABETES MELLITUS WITH HYPERGLYCEMIA, WITH LONG-TERM CURRENT USE OF INSULIN (HCC): ICD-10-CM

## 2024-02-07 DIAGNOSIS — G89.29 CHRONIC MIDLINE LOW BACK PAIN WITH RIGHT-SIDED SCIATICA: ICD-10-CM

## 2024-02-07 DIAGNOSIS — K21.9 GASTROESOPHAGEAL REFLUX DISEASE WITHOUT ESOPHAGITIS: ICD-10-CM

## 2024-02-07 DIAGNOSIS — G47.33 OSA (OBSTRUCTIVE SLEEP APNEA): ICD-10-CM

## 2024-02-07 DIAGNOSIS — M54.41 CHRONIC MIDLINE LOW BACK PAIN WITH RIGHT-SIDED SCIATICA: ICD-10-CM

## 2024-02-07 DIAGNOSIS — Z79.4 TYPE 2 DIABETES MELLITUS WITH HYPERGLYCEMIA, WITH LONG-TERM CURRENT USE OF INSULIN (HCC): ICD-10-CM

## 2024-02-07 PROCEDURE — 3079F DIAST BP 80-89 MM HG: CPT | Performed by: SURGERY

## 2024-02-07 PROCEDURE — 3077F SYST BP >= 140 MM HG: CPT | Performed by: SURGERY

## 2024-02-07 PROCEDURE — 99204 OFFICE O/P NEW MOD 45 MIN: CPT | Performed by: SURGERY

## 2024-02-07 ASSESSMENT — PATIENT HEALTH QUESTIONNAIRE - PHQ9
2. FEELING DOWN, DEPRESSED OR HOPELESS: 1
SUM OF ALL RESPONSES TO PHQ QUESTIONS 1-9: 1
1. LITTLE INTEREST OR PLEASURE IN DOING THINGS: 0
SUM OF ALL RESPONSES TO PHQ QUESTIONS 1-9: 1
SUM OF ALL RESPONSES TO PHQ QUESTIONS 1-9: 1
SUM OF ALL RESPONSES TO PHQ9 QUESTIONS 1 & 2: 1
SUM OF ALL RESPONSES TO PHQ QUESTIONS 1-9: 1

## 2024-02-07 NOTE — PROGRESS NOTES
Identified patient with two patient identifiers (name and ). Reviewed chart in preparation for visit and have obtained necessary documentation.    Queenie Hurley is a 48 y.o. male  Chief Complaint   Patient presents with    Bariatric, Initial Visit     New bariatric patient interested in lap sleeve gastrectomy     BP (!) 148/94 (Site: Left Lower Arm, Position: Sitting, Cuff Size: Large Adult)   Pulse 96   Temp 99 °F (37.2 °C)   Resp 20   Ht 1.829 m (6')   Wt (!) 175.1 kg (386 lb)   SpO2 98%   BMI 52.35 kg/m²     1. Have you been to the ER, urgent care clinic since your last visit?  Hospitalized since your last visit?new patient    2. Have you seen or consulted any other health care providers outside of the [facility] since your last visit?  Include any pap smears or colon screening. New patient    Patient and provider made aware of elevated BP x2. Patient asymptomatic. Patient reminded to monitor BP, continue to take BP medications if prescribed, and follow up with PCP/Cardiologist.  Patient expressed understanding and agreement.

## 2024-02-08 ENCOUNTER — TELEPHONE (OUTPATIENT)
Age: 49
End: 2024-02-08

## 2024-02-08 LAB
25(OH)D3+25(OH)D2 SERPL-MCNC: 8.8 NG/ML (ref 30–100)
ALBUMIN SERPL-MCNC: 4.3 G/DL (ref 4.1–5.1)
ALBUMIN/GLOB SERPL: 1.5 {RATIO} (ref 1.2–2.2)
ALP SERPL-CCNC: 86 IU/L (ref 44–121)
ALT SERPL-CCNC: 25 IU/L (ref 0–44)
AST SERPL-CCNC: 17 IU/L (ref 0–40)
BASOPHILS # BLD AUTO: 0 X10E3/UL (ref 0–0.2)
BASOPHILS NFR BLD AUTO: 1 %
BILIRUB SERPL-MCNC: 0.3 MG/DL (ref 0–1.2)
BUN SERPL-MCNC: 9 MG/DL (ref 6–24)
BUN/CREAT SERPL: 10 (ref 9–20)
CALCIUM SERPL-MCNC: 10.1 MG/DL (ref 8.7–10.2)
CHLORIDE SERPL-SCNC: 101 MMOL/L (ref 96–106)
CO2 SERPL-SCNC: 24 MMOL/L (ref 20–29)
CREAT SERPL-MCNC: 0.91 MG/DL (ref 0.76–1.27)
EGFRCR SERPLBLD CKD-EPI 2021: 105 ML/MIN/1.73
EOSINOPHIL # BLD AUTO: 0.2 X10E3/UL (ref 0–0.4)
EOSINOPHIL NFR BLD AUTO: 2 %
ERYTHROCYTE [DISTWIDTH] IN BLOOD BY AUTOMATED COUNT: 15 % (ref 11.6–15.4)
FOLATE SERPL-MCNC: 13 NG/ML
GLOBULIN SER CALC-MCNC: 2.9 G/DL (ref 1.5–4.5)
GLUCOSE SERPL-MCNC: 105 MG/DL (ref 70–99)
HBA1C MFR BLD: 8.2 % (ref 4.8–5.6)
HCT VFR BLD AUTO: 46.6 % (ref 37.5–51)
HGB BLD-MCNC: 15.1 G/DL (ref 13–17.7)
IMM GRANULOCYTES # BLD AUTO: 0 X10E3/UL (ref 0–0.1)
IMM GRANULOCYTES NFR BLD AUTO: 0 %
IRON SATN MFR SERPL: 23 % (ref 15–55)
IRON SERPL-MCNC: 70 UG/DL (ref 38–169)
LYMPHOCYTES # BLD AUTO: 4.1 X10E3/UL (ref 0.7–3.1)
LYMPHOCYTES NFR BLD AUTO: 46 %
MCH RBC QN AUTO: 26.8 PG (ref 26.6–33)
MCHC RBC AUTO-ENTMCNC: 32.4 G/DL (ref 31.5–35.7)
MCV RBC AUTO: 83 FL (ref 79–97)
MONOCYTES # BLD AUTO: 0.6 X10E3/UL (ref 0.1–0.9)
MONOCYTES NFR BLD AUTO: 7 %
NEUTROPHILS # BLD AUTO: 3.9 X10E3/UL (ref 1.4–7)
NEUTROPHILS NFR BLD AUTO: 44 %
PLATELET # BLD AUTO: 264 X10E3/UL (ref 150–450)
POTASSIUM SERPL-SCNC: 4.1 MMOL/L (ref 3.5–5.2)
PROT SERPL-MCNC: 7.2 G/DL (ref 6–8.5)
RBC # BLD AUTO: 5.63 X10E6/UL (ref 4.14–5.8)
SODIUM SERPL-SCNC: 141 MMOL/L (ref 134–144)
TIBC SERPL-MCNC: 310 UG/DL (ref 250–450)
TSH SERPL DL<=0.005 MIU/L-ACNC: 2.03 UIU/ML (ref 0.45–4.5)
UIBC SERPL-MCNC: 240 UG/DL (ref 111–343)
VIT B12 SERPL-MCNC: 803 PG/ML (ref 232–1245)
WBC # BLD AUTO: 8.9 X10E3/UL (ref 3.4–10.8)

## 2024-02-08 NOTE — PROGRESS NOTES
Bariatric Surgery Consultation    Subjective:     The patient is a 48 y.o. obese  male with a Body mass index is 52.35 kg/m²..  The patient has been at his heaviest weight for the past year;  he has been overweight since childhood;  he has been considering surgery since 2023;  he desires surgery at this time because medical efforts at weight loss have been unsuccessful.  Queenie Hurley has tried multiple diets in his lifetime most recently tried unsupervised diets.    Bariatric comorbidities present are   Patient Active Problem List   Diagnosis    HTN (hypertension)    THEA (obstructive sleep apnea)    Morbid obesity (HCC)    Carpal tunnel syndrome    Chronic back pain    Dyslipidemia, goal LDL below 100    Recurrent depression (HCC)    Type 2 diabetes mellitus with hyperglycemia, with long-term current use of insulin (HCC)    Moderate episode of recurrent major depressive disorder (HCC)     The patient desires laparoscopic gastric bypass surgery for surgical weight loss.  The patients goal weight is 220 lbs.  The highest acceptable weight is 300 lbs. These goals are consistent with expected outcomes of their desired operation.  his Medical goals are diabetes, THEA resolution; his qualty of life goals are decreased fatigue.    Patient Active Problem List    Diagnosis Date Noted    Moderate episode of recurrent major depressive disorder (HCC) 11/29/2022    Carpal tunnel syndrome 04/28/2020    Recurrent depression (HCC) 12/21/2017    Type 2 diabetes mellitus with hyperglycemia, with long-term current use of insulin (HCC) 10/12/2015    THEA (obstructive sleep apnea) 04/12/2012    Dyslipidemia, goal LDL below 100 07/01/2011    Chronic back pain 04/11/2011    HTN (hypertension) 03/18/2010    Morbid obesity (HCC) 03/18/2010      Past Surgical History:   Procedure Laterality Date    IR INJ EPIDURAL LUMBAR SACRAL WO IMG      ORTHOPEDIC SURGERY Right 2019    hand surgery      Social History     Tobacco Use

## 2024-02-08 NOTE — TELEPHONE ENCOUNTER
Returned patients call in regards to psych. Eval.  Voicemail full,unable to leave message.  Email sent to patient with paperwork for psych eval.

## 2024-02-08 NOTE — TELEPHONE ENCOUNTER
I called the patient back and he said he went to get his H-Pylori test done but he had taken his last antibiotic last week and he needs to wait 2 weeks prior to getting it done, I tld him the lab order is still good and go get it done after being off antibiotics for 2 weeks. Pt in agreement.

## 2024-02-08 NOTE — TELEPHONE ENCOUNTER
Patient called about a test he was suppose to get at [facility], (HPYLORI BREATH TEST) [place] has [place] stated to him that if he has taken within 2 weeks of antibiotics/ omeprazole; that the test wont be accurate. Patient needs to know what is his next steps.       Thanks.

## 2024-02-19 RX ORDER — LISINOPRIL 40 MG/1
40 TABLET ORAL DAILY
Qty: 90 TABLET | Refills: 0 | Status: SHIPPED | OUTPATIENT
Start: 2024-02-19

## 2024-02-19 RX ORDER — POLYETHYLENE GLYCOL 3350 17 G/17G
POWDER, FOR SOLUTION ORAL
Qty: 289 G | Refills: 1 | Status: SHIPPED | OUTPATIENT
Start: 2024-02-19

## 2024-02-22 ENCOUNTER — HOSPITAL ENCOUNTER (OUTPATIENT)
Facility: HOSPITAL | Age: 49
Discharge: HOME | End: 2024-02-22
Attending: SURGERY
Payer: MEDICAID

## 2024-02-22 DIAGNOSIS — K21.9 GASTROESOPHAGEAL REFLUX DISEASE WITHOUT ESOPHAGITIS: ICD-10-CM

## 2024-02-22 PROCEDURE — 3430000000 HC RX DIAGNOSTIC RADIOPHARMACEUTICAL: Performed by: SURGERY

## 2024-02-22 PROCEDURE — 78264 GASTRIC EMPTYING IMG STUDY: CPT

## 2024-02-22 PROCEDURE — A9541 TC99M SULFUR COLLOID: HCPCS | Performed by: SURGERY

## 2024-02-22 RX ORDER — TECHNETIUM TC 99M SULFUR COLLOID 2 MG
0.3 KIT MISCELLANEOUS ONCE
Status: COMPLETED | OUTPATIENT
Start: 2024-02-22 | End: 2024-02-22

## 2024-02-22 RX ADMIN — TECHNETIUM TC 99M SULFUR COLLOID 0.3 MILLICURIE: KIT at 11:14

## 2024-03-29 DIAGNOSIS — E11.65 TYPE 2 DIABETES MELLITUS WITH HYPERGLYCEMIA, WITH LONG-TERM CURRENT USE OF INSULIN (HCC): ICD-10-CM

## 2024-03-29 DIAGNOSIS — Z79.4 TYPE 2 DIABETES MELLITUS WITH HYPERGLYCEMIA, WITH LONG-TERM CURRENT USE OF INSULIN (HCC): ICD-10-CM

## 2024-03-29 RX ORDER — INSULIN GLARGINE 100 [IU]/ML
INJECTION, SOLUTION SUBCUTANEOUS
Qty: 30 ML | Refills: 0 | Status: SHIPPED | OUTPATIENT
Start: 2024-03-29

## 2024-03-29 RX ORDER — POLYETHYLENE GLYCOL 3350 17 G/17G
POWDER, FOR SOLUTION ORAL
Qty: 238 G | Refills: 0 | Status: SHIPPED | OUTPATIENT
Start: 2024-03-29

## 2024-04-01 ENCOUNTER — HOSPITAL ENCOUNTER (EMERGENCY)
Facility: HOSPITAL | Age: 49
Discharge: HOME | End: 2024-04-01
Attending: EMERGENCY MEDICINE
Payer: MEDICAID

## 2024-04-01 VITALS
HEIGHT: 72 IN | DIASTOLIC BLOOD PRESSURE: 91 MMHG | HEART RATE: 98 BPM | SYSTOLIC BLOOD PRESSURE: 156 MMHG | TEMPERATURE: 98.4 F | BODY MASS INDEX: 42.66 KG/M2 | RESPIRATION RATE: 18 BRPM | OXYGEN SATURATION: 94 % | WEIGHT: 315 LBS

## 2024-04-01 DIAGNOSIS — L02.212 ABSCESS OF LOWER BACK: Primary | ICD-10-CM

## 2024-04-01 PROCEDURE — 99283 EMERGENCY DEPT VISIT LOW MDM: CPT

## 2024-04-01 PROCEDURE — 10061 I&D ABSCESS COMP/MULTIPLE: CPT

## 2024-04-01 PROCEDURE — 2500000003 HC RX 250 WO HCPCS: Performed by: EMERGENCY MEDICINE

## 2024-04-01 RX ORDER — LIDOCAINE HYDROCHLORIDE AND EPINEPHRINE 15; 5 MG/ML; UG/ML
10 INJECTION, SOLUTION EPIDURAL ONCE
Status: COMPLETED | OUTPATIENT
Start: 2024-04-01 | End: 2024-04-01

## 2024-04-01 RX ORDER — DOXYCYCLINE HYCLATE 100 MG
100 TABLET ORAL 2 TIMES DAILY
Qty: 14 TABLET | Refills: 0 | Status: SHIPPED | OUTPATIENT
Start: 2024-04-01 | End: 2024-04-08

## 2024-04-01 RX ADMIN — LIDOCAINE HYDROCHLORIDE,EPINEPHRINE BITARTRATE 10 ML: 15; .005 INJECTION, SOLUTION EPIDURAL; INFILTRATION; INTRACAUDAL; PERINEURAL at 16:45

## 2024-04-01 ASSESSMENT — LIFESTYLE VARIABLES
HOW OFTEN DO YOU HAVE A DRINK CONTAINING ALCOHOL: NEVER
HOW MANY STANDARD DRINKS CONTAINING ALCOHOL DO YOU HAVE ON A TYPICAL DAY: PATIENT DOES NOT DRINK

## 2024-04-01 ASSESSMENT — PAIN DESCRIPTION - DESCRIPTORS: DESCRIPTORS: SHARP

## 2024-04-01 ASSESSMENT — PAIN DESCRIPTION - LOCATION: LOCATION: BACK

## 2024-04-01 ASSESSMENT — PAIN - FUNCTIONAL ASSESSMENT: PAIN_FUNCTIONAL_ASSESSMENT: 0-10

## 2024-04-01 ASSESSMENT — PAIN SCALES - GENERAL
PAINLEVEL_OUTOF10: 10
PAINLEVEL_OUTOF10: 3

## 2024-04-01 ASSESSMENT — PAIN DESCRIPTION - ORIENTATION: ORIENTATION: RIGHT

## 2024-04-01 NOTE — ED TRIAGE NOTES
Pt ambulated to ED.  Pt states seeing a velazquez spider on Friday in bedroom.  Pt states possibly getting bit by spider on Saturday morning to R lower back.  Pt states the pain is preventing pt from moving around to exercise.  Pt states having clear drainage.
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
## 2024-04-17 RX ORDER — POLYETHYLENE GLYCOL 3350 17 G/17G
POWDER, FOR SOLUTION ORAL
Qty: 1020 G | Refills: 0 | Status: SHIPPED | OUTPATIENT
Start: 2024-04-17

## 2024-05-01 ENCOUNTER — TELEPHONE (OUTPATIENT)
Age: 49
End: 2024-05-01

## 2024-05-01 NOTE — TELEPHONE ENCOUNTER
Spoke with patient and confirmed receipt of MyChart and made patient aware that [facility] will be in touch to schedule evaluation. Appointment with Blanca has been cancelled.

## 2024-05-07 ENCOUNTER — OFFICE VISIT (OUTPATIENT)
Age: 49
End: 2024-05-07
Payer: MEDICAID

## 2024-05-07 ENCOUNTER — TELEPHONE (OUTPATIENT)
Age: 49
End: 2024-05-07

## 2024-05-07 VITALS
SYSTOLIC BLOOD PRESSURE: 132 MMHG | RESPIRATION RATE: 15 BRPM | BODY MASS INDEX: 42.66 KG/M2 | WEIGHT: 315 LBS | HEIGHT: 72 IN | HEART RATE: 97 BPM | DIASTOLIC BLOOD PRESSURE: 80 MMHG | OXYGEN SATURATION: 95 % | TEMPERATURE: 97.9 F

## 2024-05-07 DIAGNOSIS — E55.9 VITAMIN D DEFICIENCY: ICD-10-CM

## 2024-05-07 DIAGNOSIS — I10 PRIMARY HYPERTENSION: ICD-10-CM

## 2024-05-07 DIAGNOSIS — Z79.4 TYPE 2 DIABETES MELLITUS WITH HYPERGLYCEMIA, WITH LONG-TERM CURRENT USE OF INSULIN (HCC): ICD-10-CM

## 2024-05-07 DIAGNOSIS — G47.33 OSA (OBSTRUCTIVE SLEEP APNEA): ICD-10-CM

## 2024-05-07 DIAGNOSIS — E11.65 TYPE 2 DIABETES MELLITUS WITH HYPERGLYCEMIA, WITH LONG-TERM CURRENT USE OF INSULIN (HCC): ICD-10-CM

## 2024-05-07 DIAGNOSIS — K21.9 GASTROESOPHAGEAL REFLUX DISEASE WITHOUT ESOPHAGITIS: ICD-10-CM

## 2024-05-07 DIAGNOSIS — E66.01 MORBID OBESITY (HCC): Primary | ICD-10-CM

## 2024-05-07 PROCEDURE — 99214 OFFICE O/P EST MOD 30 MIN: CPT | Performed by: NURSE PRACTITIONER

## 2024-05-07 PROCEDURE — 3075F SYST BP GE 130 - 139MM HG: CPT | Performed by: NURSE PRACTITIONER

## 2024-05-07 PROCEDURE — 3079F DIAST BP 80-89 MM HG: CPT | Performed by: NURSE PRACTITIONER

## 2024-05-07 PROCEDURE — 3052F HG A1C>EQUAL 8.0%<EQUAL 9.0%: CPT | Performed by: NURSE PRACTITIONER

## 2024-05-07 RX ORDER — ERGOCALCIFEROL 1.25 MG/1
50000 CAPSULE ORAL WEEKLY
Qty: 12 CAPSULE | Refills: 1 | Status: SHIPPED | OUTPATIENT
Start: 2024-05-07

## 2024-05-07 ASSESSMENT — PATIENT HEALTH QUESTIONNAIRE - PHQ9
1. LITTLE INTEREST OR PLEASURE IN DOING THINGS: NOT AT ALL
SUM OF ALL RESPONSES TO PHQ9 QUESTIONS 1 & 2: 1
SUM OF ALL RESPONSES TO PHQ QUESTIONS 1-9: 1
SUM OF ALL RESPONSES TO PHQ QUESTIONS 1-9: 1
2. FEELING DOWN, DEPRESSED OR HOPELESS: SEVERAL DAYS
SUM OF ALL RESPONSES TO PHQ QUESTIONS 1-9: 1
SUM OF ALL RESPONSES TO PHQ QUESTIONS 1-9: 1

## 2024-05-07 ASSESSMENT — ENCOUNTER SYMPTOMS
VOMITING: 0
SHORTNESS OF BREATH: 0
BACK PAIN: 1
NAUSEA: 0
ABDOMINAL PAIN: 0

## 2024-05-07 NOTE — TELEPHONE ENCOUNTER
Attempted to call patient regarding [facility] insurance requirements, VM not set up.     Patient had midway with Zara today, still needs all requirements.     -UGI  -LABS  -PSYCH  -NUTRITION  -GASTRIC EMPTY STUDY  -PCP SUPPORT FORM

## 2024-05-07 NOTE — TELEPHONE ENCOUNTER
Identified patient with two patient identifiers (name and ). Reviewed chart in preparation for encounter and have obtained necessary documentation.     Called and spoke with patient regarding [employer] insurance requirements. Patient stated he never received his Bariatric insurance letter due to mail issues, I advised him to  the letter from the office vs us mailing another letter due to mail issues. Patient agreed and stated he would come by this week to . Patient aware requirements are listen in bariatric letter as well as PCP support form. Patient understood what was discussed and was thankful .

## 2024-05-07 NOTE — PROGRESS NOTES
Identified patient with two patient identifiers (name and ). Reviewed chart in preparation for visit and have obtained necessary documentation.    Queenie Hurley is a 48 y.o. male  Chief Complaint   Patient presents with    Follow-up     [facility]     Obesity     /80 (Site: Right Upper Arm, Position: Sitting, Cuff Size: Large Adult)   Pulse 97   Temp 97.9 °F (36.6 °C) (Oral)   Resp 15   Ht 1.829 m (6')   Wt (!) 168.8 kg (372 lb 3.2 oz)   SpO2 95%   BMI 50.48 kg/m²     1. Have you been to the ER, urgent care clinic since your last visit?  Hospitalized since your last visit?no    2. Have you seen or consulted any other health care providers outside of the [facility] since your last visit?  Include any pap smears or colon screening. Yes pain management

## 2024-05-07 NOTE — TELEPHONE ENCOUNTER
Patient returned call stating he has psych eval on the May 14th. Patient asked to receive a call in regards to the rest of his requirements

## 2024-05-07 NOTE — PROGRESS NOTES
Chief Complaint   Patient presents with    Follow-up     [facility]     Obesity       Queenie Hurley 1975 presents today for presurgical bariatric evaluation in preparation for:     Procedure Gastric bypass  Surgeon Dr. Wallace Kathleen Weight Metrics:      5/7/2024    10:58 AM 4/1/2024     3:48 PM 2/7/2024     1:42 PM 1/16/2024    12:49 PM 1/14/2024    10:59 AM 11/20/2023     7:12 AM 10/12/2023    11:20 AM   Weight Loss Metrics   Height 6' 0\" 6' 0\" 6' 0\" 6' 0\" 6' 0\" 6' 0\" 6' 0\"   Weight - Scale 372 lbs 3 oz 377 lbs 7 oz 386 lbs 386 lbs 385 lbs 385 lbs 381 lbs   BMI (Calculated) 50.6 kg/m2 51.3 kg/m2 52.5 kg/m2 52.5 kg/m2 52.3 kg/m2 52.3 kg/m2 51.8 kg/m2       [x] Bariatric comorbidities present: hypertension, insulin dependent diabetes, and obstructive sleep apnea    [x] Ambulatory status: independent    [x] The patient's reported level of exercise: very active at the gym.  Exercise: encouraged to perform at least 30 mins of at least moderate-intensity physical activity on 5 or more days a week. The activity can be undertaken in one session or several lasting 10 mins or more. Use of a wearable fitness device may help meet activity goals and was recommended.     [x] Nutrient screening with iron studies, B12, folic acid, and 25-vitamin D   Iron  70  B12/Folate   803    D   8.8  [] Sleep apnea screening  [] Sleep Medicine referral     Uses CPAP    [] GI evaluation     Upper GI results = has not done.     Gastric emptying study-   IMPRESSION:  Normal gastric emptying study.        H. Pylori results   -has not done due to being on antibiotic and acid reducer.     [] Endocrine evaluation (HgA1C <8% prior to surgery)    His PCP has been adjusting his diabetes medication.      HgA1C  8.2    Date   2/7/2024    TSH results    TSH (uIU/mL)   Date Value   02/07/2024 2.030           [] Psychological Evaluation- not done    [] Nutrition Evaluation/Visits - has not started    [] Cardiac Evaluation     [] Pulmonary

## 2024-05-20 ENCOUNTER — OFFICE VISIT (OUTPATIENT)
Dept: PRIMARY CARE CLINIC | Facility: CLINIC | Age: 49
End: 2024-05-20
Payer: MEDICAID

## 2024-05-20 ENCOUNTER — TELEPHONE (OUTPATIENT)
Age: 49
End: 2024-05-20

## 2024-05-20 ENCOUNTER — TELEPHONE (OUTPATIENT)
Dept: PRIMARY CARE CLINIC | Facility: CLINIC | Age: 49
End: 2024-05-20

## 2024-05-20 VITALS
BODY MASS INDEX: 42.66 KG/M2 | DIASTOLIC BLOOD PRESSURE: 84 MMHG | TEMPERATURE: 98.3 F | HEIGHT: 72 IN | OXYGEN SATURATION: 97 % | HEART RATE: 80 BPM | SYSTOLIC BLOOD PRESSURE: 132 MMHG | WEIGHT: 315 LBS

## 2024-05-20 DIAGNOSIS — E55.9 VITAMIN D DEFICIENCY: ICD-10-CM

## 2024-05-20 DIAGNOSIS — E78.5 DYSLIPIDEMIA, GOAL LDL BELOW 100: ICD-10-CM

## 2024-05-20 DIAGNOSIS — M75.41 IMPINGEMENT SYNDROME OF RIGHT SHOULDER: ICD-10-CM

## 2024-05-20 DIAGNOSIS — Z79.4 TYPE 2 DIABETES MELLITUS WITH HYPERGLYCEMIA, WITH LONG-TERM CURRENT USE OF INSULIN (HCC): Primary | ICD-10-CM

## 2024-05-20 DIAGNOSIS — F33.1 MAJOR DEPRESSIVE DISORDER, RECURRENT, MODERATE (HCC): ICD-10-CM

## 2024-05-20 DIAGNOSIS — E11.65 TYPE 2 DIABETES MELLITUS WITH HYPERGLYCEMIA, WITH LONG-TERM CURRENT USE OF INSULIN (HCC): Primary | ICD-10-CM

## 2024-05-20 DIAGNOSIS — G44.52 NEW DAILY PERSISTENT HEADACHE (NDPH): ICD-10-CM

## 2024-05-20 DIAGNOSIS — K59.03 DRUG-INDUCED CONSTIPATION: ICD-10-CM

## 2024-05-20 DIAGNOSIS — R00.0 TACHYCARDIA, UNSPECIFIED: ICD-10-CM

## 2024-05-20 DIAGNOSIS — I10 ESSENTIAL (PRIMARY) HYPERTENSION: ICD-10-CM

## 2024-05-20 DIAGNOSIS — K21.9 GASTRO-ESOPHAGEAL REFLUX DISEASE WITHOUT ESOPHAGITIS: ICD-10-CM

## 2024-05-20 PROCEDURE — 3052F HG A1C>EQUAL 8.0%<EQUAL 9.0%: CPT | Performed by: NURSE PRACTITIONER

## 2024-05-20 PROCEDURE — 99214 OFFICE O/P EST MOD 30 MIN: CPT | Performed by: NURSE PRACTITIONER

## 2024-05-20 PROCEDURE — 3079F DIAST BP 80-89 MM HG: CPT | Performed by: NURSE PRACTITIONER

## 2024-05-20 PROCEDURE — 3075F SYST BP GE 130 - 139MM HG: CPT | Performed by: NURSE PRACTITIONER

## 2024-05-20 RX ORDER — ATENOLOL 50 MG/1
50 TABLET ORAL DAILY
Qty: 90 TABLET | Refills: 1 | Status: SHIPPED | OUTPATIENT
Start: 2024-05-20

## 2024-05-20 RX ORDER — LISINOPRIL 40 MG/1
40 TABLET ORAL DAILY
Qty: 90 TABLET | Refills: 0 | Status: SHIPPED | OUTPATIENT
Start: 2024-05-20

## 2024-05-20 RX ORDER — ROSUVASTATIN CALCIUM 10 MG/1
10 TABLET, COATED ORAL DAILY
Qty: 90 TABLET | Refills: 1 | Status: SHIPPED | OUTPATIENT
Start: 2024-05-20

## 2024-05-20 RX ORDER — DICLOFENAC SODIUM 75 MG/1
75 TABLET, DELAYED RELEASE ORAL 2 TIMES DAILY
Qty: 60 TABLET | Refills: 3 | Status: SHIPPED | OUTPATIENT
Start: 2024-05-20

## 2024-05-20 RX ORDER — HYDROCHLOROTHIAZIDE 25 MG/1
TABLET ORAL
Qty: 90 TABLET | Refills: 1 | Status: SHIPPED | OUTPATIENT
Start: 2024-05-20

## 2024-05-20 RX ORDER — INSULIN LISPRO 100 [IU]/ML
INJECTION, SOLUTION INTRAVENOUS; SUBCUTANEOUS
Qty: 30 ML | Refills: 3 | Status: SHIPPED | OUTPATIENT
Start: 2024-05-20

## 2024-05-20 RX ORDER — DILTIAZEM HYDROCHLORIDE 240 MG/1
240 CAPSULE, COATED, EXTENDED RELEASE ORAL DAILY
Qty: 90 CAPSULE | Refills: 1 | Status: SHIPPED | OUTPATIENT
Start: 2024-05-20

## 2024-05-20 RX ORDER — INSULIN GLARGINE 100 [IU]/ML
50 INJECTION, SOLUTION SUBCUTANEOUS 2 TIMES DAILY
Qty: 90 ML | Refills: 1 | Status: SHIPPED | OUTPATIENT
Start: 2024-05-20 | End: 2024-11-16

## 2024-05-20 RX ORDER — BLOOD-GLUCOSE SENSOR
EACH MISCELLANEOUS
Qty: 6 EACH | Refills: 3 | Status: SHIPPED | OUTPATIENT
Start: 2024-05-20

## 2024-05-20 RX ORDER — TIRZEPATIDE 7.5 MG/.5ML
7.5 INJECTION, SOLUTION SUBCUTANEOUS WEEKLY
Qty: 2 ML | Refills: 0 | Status: SHIPPED | OUTPATIENT
Start: 2024-05-20

## 2024-05-20 RX ORDER — TIRZEPATIDE 2.5 MG/.5ML
2.5 INJECTION, SOLUTION SUBCUTANEOUS WEEKLY
Qty: 2 ML | Refills: 0 | Status: SHIPPED | OUTPATIENT
Start: 2024-05-20

## 2024-05-20 RX ORDER — SPIRONOLACTONE 25 MG/1
25 TABLET ORAL DAILY
Qty: 90 TABLET | Refills: 0 | Status: SHIPPED | OUTPATIENT
Start: 2024-05-20

## 2024-05-20 RX ORDER — OMEPRAZOLE 20 MG/1
20 CAPSULE, DELAYED RELEASE ORAL DAILY
Qty: 90 CAPSULE | Refills: 0 | Status: SHIPPED | OUTPATIENT
Start: 2024-05-20

## 2024-05-20 RX ORDER — DULOXETIN HYDROCHLORIDE 60 MG/1
60 CAPSULE, DELAYED RELEASE ORAL DAILY
Qty: 90 CAPSULE | Refills: 1 | Status: SHIPPED | OUTPATIENT
Start: 2024-05-20

## 2024-05-20 ASSESSMENT — ENCOUNTER SYMPTOMS: SHORTNESS OF BREATH: 0

## 2024-05-20 NOTE — TELEPHONE ENCOUNTER
The payer has not yet made a decision on the prior authorization request. Case ID: [id number]      Payer: [employer] [place]    [phone]   Prior auth initiated by: [address] [phone] - F [phone]   Tirzepatide (MOUNJARO) 2.5 MG/0.5ML SOPN SC injection

## 2024-05-20 NOTE — PROGRESS NOTES
I have reviewed all needed documentation in preparation for visit. Verified patient by name and date of birth. Rooming procedures conducted per protocol. Reviewed allergies and medications with patient.   Queenie Hurley is a 49 y.o. male for Follow-up (Hypertension)       Vitals:    05/20/24 1108   BP: 132/84   Site: Left Upper Arm   Position: Sitting   Cuff Size: Large Adult   Pulse: 80   Temp: 98.3 °F (36.8 °C)   TempSrc: Oral   SpO2: 97%   Weight: (!) 172.4 kg (380 lb)   Height: 1.829 m (6' 0.01\")      No data recorded  No LMP for male patient.      Health Maintenance Due   Topic Date Due    Hepatitis B vaccine (1 of 3 - 3-dose series) Never done    HIV screen  Never done    Hepatitis C screen  Never done    DTaP/Tdap/Td vaccine (1 - Tdap) 01/10/2012    Diabetic retinal exam  08/14/2018    Pneumococcal 0-64 years Vaccine (2 of 2 - PCV) 08/28/2019    Colorectal Cancer Screen  Never done    Diabetic foot exam  08/03/2022    COVID-19 Vaccine (4 - 2023-24 season) 09/01/2023        \"Have you been to the ER, urgent care clinic since your last visit?  Hospitalized since your last visit?\"    Yes on 4/1/24 for Abscess of lower back    “Have you seen or consulted any other health care providers outside of [facility] since your last visit?”    NO    “Have you had a colorectal cancer screening such as a colonoscopy/FIT/Cologuard?    NO    No colonoscopy on file  No cologuard on file  No FIT/FOBT on file   No flexible sigmoidoscopy on file              
tablet, R-0Normal  -     spironolactone (ALDACTONE) 25 MG tablet; Take 1 tablet by mouth daily, Disp-90 tablet, R-0Normal  3. New daily persistent headache (ndph)  Comments:  stable on topomax  Orders:  -     atenolol (TENORMIN) 50 MG tablet; Take 1 tablet by mouth daily, Disp-90 tablet, R-1Normal  4. Impingement syndrome of right shoulder  Comments:  Following with pain management.  Orders:  -     diclofenac (VOLTAREN) 75 MG EC tablet; Take 1 tablet by mouth 2 times daily, Disp-60 tablet, R-3Normal  5. Tachycardia, unspecified  Comments:  stable on diltiazem.  Work up completed by cardiology, Dr. Chun.   Orders:  -     dilTIAZem (CARTIA XT) 240 MG extended release capsule; Take 1 capsule by mouth daily, Disp-90 capsule, R-1Normal  6. Major depressive disorder, recurrent, moderate (HCC)  Comments:  stable on cymbalta.   Orders:  -     DULoxetine (CYMBALTA) 60 MG extended release capsule; Take 1 capsule by mouth daily, Disp-90 capsule, R-1Normal  7. Gastro-esophageal reflux disease without esophagitis  Comments:  stable on omeprazole  Orders:  -     omeprazole (PRILOSEC) 20 MG delayed release capsule; Take 1 capsule by mouth daily, Disp-90 capsule, R-0Normal  8. Dyslipidemia, goal LDL below 100  Comments:  due for fasting labs to assess.   contiue crestor  Orders:  -     rosuvastatin (CRESTOR) 10 MG tablet; Take 1 tablet by mouth daily, Disp-90 tablet, R-1Normal  9. Vitamin D deficiency  Comments:  will monitor labs, last vitamin D was 8 x3 months ago in labs with bariatric surgeon.  Orders:  -     Vitamin D 25 Hydroxy  10. Drug-induced constipation  Comments:  tried and failed OTC regimen.  Will start on linzess in addition to higher fiber/water intake.  Orders:  -     linaclotide (LINZESS) 145 MCG capsule; Take 1 capsule by mouth every morning (before breakfast), Disp-90 capsule, R-1Normal      Return in about 6 months (around 11/20/2024) for Chronic OV.    Subjective:   Queenie was seen today for Follow-up

## 2024-05-21 ENCOUNTER — HOSPITAL ENCOUNTER (OUTPATIENT)
Facility: HOSPITAL | Age: 49
Discharge: HOME | End: 2024-05-24
Attending: SURGERY
Payer: MEDICAID

## 2024-05-21 ENCOUNTER — TELEPHONE (OUTPATIENT)
Dept: PRIMARY CARE CLINIC | Facility: CLINIC | Age: 49
End: 2024-05-21

## 2024-05-21 DIAGNOSIS — K21.9 GASTROESOPHAGEAL REFLUX DISEASE WITHOUT ESOPHAGITIS: ICD-10-CM

## 2024-05-21 LAB
25(OH)D3+25(OH)D2 SERPL-MCNC: 22.4 NG/ML (ref 30–100)
ALBUMIN SERPL-MCNC: 4.3 G/DL (ref 4.1–5.1)
ALBUMIN/GLOB SERPL: 1.7 {RATIO} (ref 1.2–2.2)
ALP SERPL-CCNC: 88 IU/L (ref 44–121)
ALT SERPL-CCNC: 20 IU/L (ref 0–44)
AST SERPL-CCNC: 17 IU/L (ref 0–40)
BILIRUB SERPL-MCNC: 0.3 MG/DL (ref 0–1.2)
BUN SERPL-MCNC: 8 MG/DL (ref 6–24)
BUN/CREAT SERPL: 9 (ref 9–20)
CALCIUM SERPL-MCNC: 9 MG/DL (ref 8.7–10.2)
CHLORIDE SERPL-SCNC: 105 MMOL/L (ref 96–106)
CHOLEST SERPL-MCNC: 156 MG/DL (ref 100–199)
CO2 SERPL-SCNC: 25 MMOL/L (ref 20–29)
CREAT SERPL-MCNC: 0.92 MG/DL (ref 0.76–1.27)
EGFRCR SERPLBLD CKD-EPI 2021: 103 ML/MIN/1.73
ERYTHROCYTE [DISTWIDTH] IN BLOOD BY AUTOMATED COUNT: 14.6 % (ref 11.6–15.4)
GLOBULIN SER CALC-MCNC: 2.5 G/DL (ref 1.5–4.5)
GLUCOSE SERPL-MCNC: 110 MG/DL (ref 70–99)
HBA1C MFR BLD: 7.4 % (ref 4.8–5.6)
HCT VFR BLD AUTO: 46.8 % (ref 37.5–51)
HDLC SERPL-MCNC: 42 MG/DL
HGB BLD-MCNC: 15.1 G/DL (ref 13–17.7)
LDLC SERPL CALC-MCNC: 98 MG/DL (ref 0–99)
MCH RBC QN AUTO: 27 PG (ref 26.6–33)
MCHC RBC AUTO-ENTMCNC: 32.3 G/DL (ref 31.5–35.7)
MCV RBC AUTO: 84 FL (ref 79–97)
PLATELET # BLD AUTO: 289 X10E3/UL (ref 150–450)
POTASSIUM SERPL-SCNC: 4.3 MMOL/L (ref 3.5–5.2)
PROT SERPL-MCNC: 6.8 G/DL (ref 6–8.5)
RBC # BLD AUTO: 5.6 X10E6/UL (ref 4.14–5.8)
SODIUM SERPL-SCNC: 140 MMOL/L (ref 134–144)
TRIGL SERPL-MCNC: 84 MG/DL (ref 0–149)
VLDLC SERPL CALC-MCNC: 16 MG/DL (ref 5–40)
WBC # BLD AUTO: 9.4 X10E3/UL (ref 3.4–10.8)

## 2024-05-21 PROCEDURE — 74240 X-RAY XM UPR GI TRC 1CNTRST: CPT

## 2024-05-21 NOTE — TELEPHONE ENCOUNTER
Tirzepatide (MOUNJARO) 5 MG/0.5ML SOPN SC injection   Request Reference Number: [id number]. MOUNJARO INJ 5MG/0.5 is denied for not meeting the prior authorization requirement(s). For further questions, call [facility] at [phone] for more information. Appeals are not supported through ePA. Please refer to the fax case notice for appeals information and instructions. Case ID: [id number]      Payer: [employer] [place]   Electronic appeal: Not supported   Prior auth initiated by: [facility] [address] [place] [address] -  [phone] -  [phone]

## 2024-05-31 ENCOUNTER — OFFICE VISIT (OUTPATIENT)
Age: 49
End: 2024-05-31

## 2024-05-31 DIAGNOSIS — E66.01 MORBID OBESITY (HCC): Primary | ICD-10-CM

## 2024-05-31 NOTE — PROGRESS NOTES
Pre-operative Bariatric Nutrition Evaluation ([facility]  6)     Date: 2024   Physician/Surgeon:Zara Donis N.P.   Name: Queenie Hulrey  :  1975  Age:  49  Gender: Male   Type of Surgery: [x]           Gastric Bypass   []           Sleeve Gastrectomy    ASSESSMENT:      Medications/Supplements:   Prior to Admission medications    Medication Sig Start Date End Date Taking? Authorizing Provider   Tirzepatide (MOUNJARO) 7.5 MG/0.5ML SOPN SC injection Inject 0.5 mLs into the skin once a week 24   Shemar Lackey APRN - NP   Tirzepatide (MOUNJARO) 5 MG/0.5ML SOPN SC injection Inject 0.5 mLs into the skin once a week 24   Shemar Lackey APRN - NP   Tirzepatide (MOUNJARO) 2.5 MG/0.5ML SOPN SC injection Inject 0.5 mLs into the skin once a week 24   Shemar Lackey APRN - NP   atenolol (TENORMIN) 50 MG tablet Take 1 tablet by mouth daily 24   Shemar Lackey APRN - NP   diclofenac (VOLTAREN) 75 MG EC tablet Take 1 tablet by mouth 2 times daily 24   Shemar Lackey APRN - NP   dilTIAZem (CARTIA XT) 240 MG extended release capsule Take 1 capsule by mouth daily 24   Shemar Lackey APRN - NP   DULoxetine (CYMBALTA) 60 MG extended release capsule Take 1 capsule by mouth daily 24   Shemar Lackey APRN - NP   empagliflozin (JARDIANCE) 25 MG tablet Take 1 tablet by mouth daily 24   Shemar Lackey APRN - NP   hydroCHLOROthiazide (HYDRODIURIL) 25 MG tablet TAKE 1 TABLET BY MOUTH ONCE DAILY 24   Shemar Lackey APRN - NP   insulin lispro, 1 Unit Dial, (HUMALOG/ADMELOG) 100 UNIT/ML SOPN INJECT 20 UNITS SUBCUTANEOUSLY BEFORE BREAKFAST, LUNCH, AND DINNER 24   Shemar Lackey APRN - NP   LANTUS SOLOSTAR 100 UNIT/ML injection pen Inject 50 Units into the skin 2 times daily 24  Shemar Lackey APRN - NP   lisinopril (PRINIVIL;ZESTRIL) 40 MG tablet Take 1 tablet by mouth daily 24   Shemar Lackey APRN - NP   omeprazole (PRILOSEC) 20 MG delayed

## 2024-06-06 DIAGNOSIS — B37.9 CANDIDIASIS: ICD-10-CM

## 2024-06-07 RX ORDER — NYSTATIN 100000 U/G
OINTMENT TOPICAL
Qty: 30 G | Refills: 0 | Status: SHIPPED | OUTPATIENT
Start: 2024-06-07

## 2024-06-07 RX ORDER — POLYETHYLENE GLYCOL 3350 17 G/17G
POWDER, FOR SOLUTION ORAL
Qty: 1020 G | Refills: 2 | Status: SHIPPED | OUTPATIENT
Start: 2024-06-07

## 2024-06-13 ENCOUNTER — TELEPHONE (OUTPATIENT)
Dept: PRIMARY CARE CLINIC | Facility: CLINIC | Age: 49
End: 2024-06-13

## 2024-06-13 NOTE — TELEPHONE ENCOUNTER
Patient called about his Tirzepatide (MOUNJARO) 7.5 MG/0.5ML SOPN SC injection  [facility] says it still needs a prior auth but on our end it shows that he doesn't. Please follow up with patient and let him know what he needs to do.

## 2024-06-14 ENCOUNTER — TELEPHONE (OUTPATIENT)
Dept: PRIMARY CARE CLINIC | Facility: CLINIC | Age: 49
End: 2024-06-14

## 2024-06-14 NOTE — TELEPHONE ENCOUNTER
The payer has not yet made a decision on the prior authorization request. Case ID: [id number]      Payer: [employer] [place]    [phone]   [facility] [address] [phone] - F [phone]

## 2024-06-17 ENCOUNTER — TELEPHONE (OUTPATIENT)
Dept: PRIMARY CARE CLINIC | Facility: CLINIC | Age: 49
End: 2024-06-17

## 2024-06-17 NOTE — TELEPHONE ENCOUNTER
Sent to Plan today  Drug  Linzess 145MCG capsules  Form  [employer] Electronic Prior Authorization Form (2017 NCPDP)

## 2024-06-17 NOTE — TELEPHONE ENCOUNTER
Payer: [employer] [place]   We received a prior authorization request for the member and product listed above. The Community and State Medicaid Prior Authorization Team is not able to review this request because the brand name for the requested product is preferred on the formulary and is covered without prior authorization requirements. Please have the dispensing pharmacy fill the prescription using the brand name version. If this request is for the generic version, please resubmit this request indicating such, along with supporting documentations for coverage exception review. The pharmacy may obtain assistance by contacting the [employer] Help Desk anytime at [phone].   Prior auth initiated by: [facility] [address] [phone] -  [phone]   insulin lispro, 1 Unit Dial, (HUMALOG/ADMELOG) 100 UNIT/ML SOPN

## 2024-06-20 ENCOUNTER — PATIENT MESSAGE (OUTPATIENT)
Dept: PRIMARY CARE CLINIC | Facility: CLINIC | Age: 49
End: 2024-06-20

## 2024-06-20 DIAGNOSIS — E11.65 TYPE 2 DIABETES MELLITUS WITH HYPERGLYCEMIA, WITH LONG-TERM CURRENT USE OF INSULIN (HCC): Primary | ICD-10-CM

## 2024-06-20 DIAGNOSIS — Z79.4 TYPE 2 DIABETES MELLITUS WITH HYPERGLYCEMIA, WITH LONG-TERM CURRENT USE OF INSULIN (HCC): Primary | ICD-10-CM

## 2024-06-20 RX ORDER — DULAGLUTIDE 1.5 MG/.5ML
1.5 INJECTION, SOLUTION SUBCUTANEOUS WEEKLY
Qty: 6 ML | Refills: 1 | Status: SHIPPED | OUTPATIENT
Start: 2024-06-20

## 2024-06-20 NOTE — TELEPHONE ENCOUNTER
From: Queenie Hurley  To: Shemar Lackey  Sent: 6/20/2024 10:14 AM EDT  Subject: Not received insulin     Morning, my pharmacy says they won't have moujaro anytime soon. So I suggested that they put me back on trulicity because I know I have two refills. And they said that they don't have the three units that's prescribed they told me to call you and see if you will prescribe the lower dosage 1.3 or the one that's above that 1.3 of trulicity but the 3units is a no go... Pharmacy said it's easier to get lowee doses in of trulicity. So I'm going back trulicity. Cant get moujaro at all. I'm sorry if I spelled Moujaro incorrectly.   birthday is May 13th 1975 Shadi Hurley.   Thank you

## 2024-06-28 ENCOUNTER — OFFICE VISIT (OUTPATIENT)
Age: 49
End: 2024-06-28

## 2024-06-28 DIAGNOSIS — E66.01 MORBID OBESITY (HCC): Primary | ICD-10-CM

## 2024-06-28 NOTE — PROGRESS NOTES
[facility]  Supervised Weight Loss     Date:   2024    Patient's Name: Queenie Hurley  : 1975    Insurance:  [facility]            Session:   Surgery: Gastric Bypass  Surgeon:  Bowen Gonsalez M.D.     Height: 6'   Previous Reported Weight:    372      Lbs.   BMI: 50   Pounds Lost since last month: 0               Pounds Gained since last month: 0    Starting Weight: 386#   Previous Month’s Weight: 372#  Overall Pounds Lost: 14#  Overall Pounds Gained: 0    Other Pertinent Information: Today's appointment was completed over the phone. Pt recommended to lose 18# prior to surgery approval.      Smoking Status:  none  Alcohol Intake: none    I have reviewed with pt the guidelines of the supervised wt loss program.  Pt understands the expectations of some wt loss during the program and that wt gain could delay the process. I have also explained that classes need to be consecutive.  Missing a class may result in starting over. Pt has received this information in writing.          Changes that patient has made since last month include:  eating more protein, using protein shakes/powder.      Eating Habits and Behaviors  General healthy eating guidelines were discussed. A nutrition lesson specific to the importance of protein intake after surgery was provided. We discussed food sources of protein, protein supplements and multiple reasons as to why protein is important after bariatric surgery.  Pts were instructed to focus on including protein at every meal and practice eating protein first at the meal. Pts were encouraged to sample a protein shake for tolerance. Patients were also instructed to use the balanced plate method for help with portion control and general healthy eating prior to surgery. We discussed measuring meals to 1/2 cup total per meal after surgery. Drinking only calorie-free, sugar-free and non-carbonated beverages. We discussed the importance of

## 2024-07-01 DIAGNOSIS — B37.9 CANDIDIASIS: ICD-10-CM

## 2024-07-01 RX ORDER — NYSTATIN 100000 U/G
OINTMENT TOPICAL
Qty: 30 G | Refills: 0 | Status: SHIPPED | OUTPATIENT
Start: 2024-07-01

## 2024-07-01 RX ORDER — NYSTATIN 100000 [USP'U]/G
POWDER TOPICAL
Qty: 60 G | Refills: 0 | Status: SHIPPED | OUTPATIENT
Start: 2024-07-01

## 2024-07-26 ENCOUNTER — OFFICE VISIT (OUTPATIENT)
Age: 49
End: 2024-07-26

## 2024-07-26 DIAGNOSIS — E66.01 MORBID OBESITY (HCC): Primary | ICD-10-CM

## 2024-07-26 NOTE — PROGRESS NOTES
[facility]  Supervised Weight Loss     Date:   2024    Patient's Name: Queenie Hurley  : 1975    Insurance:  [facility]                                Session: 3 of  6  Surgery: Gastric Bypass                  Surgeon:  Bowen Gonsalez M.D.      Height: 6'                    Reported Weight:    374      Lbs.                          BMI: 50              Pounds Lost since last month: 0               Pounds Gained since last month: 0     Starting Weight: 386#                       Previous Month’s Weight: 372#  Overall Pounds Lost: 12#                Overall Pounds Gained: 0     Other Pertinent Information: Today's appointment was completed over the phone. Pt recommended to lose 18# prior to surgery approval. Reports ankle and back pain has limited exercise.     Smoking Status:  none  Alcohol Intake: none    I have reviewed with pt the guidelines of the supervised wt loss program.  Pt understands the expectations of some wt loss during the program and that wt gain could delay the process. I have also explained that appointments need to be consecutive and missing an appointment may result in starting over. Pt has received this information in writing.          Changes that patient has made since last month include:  maintaining previously made changes.      Eating Habits and Behaviors  A nutrition lesson specific to vitamins was provided. We discussed the various reasons for needing vitamins and different types and doses. General healthy eating guidelines were also discussed. Pts were instructed that their plate should be made up 1/2 plate coming from non-starchy vegetables, 1/4 coming from lean meat, and 1/4 of their plate coming from carbohydrates, including fruits, starches, or milk.  We discussed measuring meals to 1/2 cup total per meal after surgery. Drinking only calorie-free, sugar-free and non-carbonated beverages. We discussed the importance of drinking 64

## 2024-07-30 ENCOUNTER — COMMUNITY OUTREACH (OUTPATIENT)
Dept: PRIMARY CARE CLINIC | Facility: CLINIC | Age: 49
End: 2024-07-30

## 2024-08-21 ENCOUNTER — TELEPHONE (OUTPATIENT)
Age: 49
End: 2024-08-21

## 2024-08-21 ENCOUNTER — OFFICE VISIT (OUTPATIENT)
Age: 49
End: 2024-08-21

## 2024-08-21 DIAGNOSIS — E66.01 MORBID OBESITY (HCC): Primary | ICD-10-CM

## 2024-08-21 NOTE — TELEPHONE ENCOUNTER
Identified patient with two patient identifiers (name and ). Reviewed chart in preparation for encounter and have obtained necessary documentation.    Called and spoke with patient to inform him he still needs his H. Pylori labs done and to finish nutrition. Patient stated he has to wait two weeks til he's been off of antacids to have labs done but he will have them done by the time nutrition visits are finished. Patient understood what was discussed and thankful for the follow up call.

## 2024-08-21 NOTE — PROGRESS NOTES
[facility]  Supervised Weight Loss     Date:   2024    Patient's Name: Queenie Hurley  : 1975    Insurance:  [facility]                                Session:   Surgery: Gastric Bypass                  Surgeon:  Bowen Gonsalez M.D.      Height: 6'                    Reported Weight:    371      Lbs.                          BMI: 50              Pounds Lost since last month: 3#               Pounds Gained since last month: 0     Starting Weight: 386#                       Previous Month’s Weight: 374#  Overall Pounds Lost: 15#                Overall Pounds Gained: 0     Other Pertinent Information: Today's appointment was completed over the phone. Pt recommended to lose 18# prior to surgery approval. Reports continued ankle and back pain which has limited exercise.    Smoking Status:  none  Alcohol Intake: none    I have reviewed with pt the guidelines of the supervised wt loss program.  Pt understands the expectations of some wt loss during the program and that wt gain could delay the process. I have also explained that appointments need to be consecutive and missing an appointment may result in starting over. Pt has received this information in writing.          Changes that patient has made since last month include:  maintaining previously made changes to eating patterns, still focused on protein.      Eating Habits and Behaviors  General healthy eating guidelines were also discussed. Pts were instructed that their plate should be made up 1/2 plate coming from non-starchy vegetables, 1/4 coming from lean meat, and 1/4 of their plate coming from carbohydrates, including fruits, starches, or milk.  We discussed measuring meals to 1/2 cup total per meal after surgery. Drinking only calorie-free, sugar-free and non-carbonated beverages. We discussed the importance of drinking 64 ounces of fluid per day to prevent dehydration post-operatively.

## 2024-09-18 ENCOUNTER — OFFICE VISIT (OUTPATIENT)
Age: 49
End: 2024-09-18

## 2024-09-18 DIAGNOSIS — E66.01 MORBID OBESITY (HCC): Primary | ICD-10-CM

## 2024-09-26 ENCOUNTER — TELEPHONE (OUTPATIENT)
Age: 49
End: 2024-09-26

## 2024-09-27 LAB — UREA BREATH TEST QL: POSITIVE

## 2024-09-30 DIAGNOSIS — A04.8 H. PYLORI INFECTION: Primary | ICD-10-CM

## 2024-09-30 RX ORDER — VONOPRAZAN FUMARATE, AMOXICILLIN AND CLARITHROMYCIN 20-500-500
1 KIT ORAL 2 TIMES DAILY
Qty: 1 EACH | Refills: 0 | Status: SHIPPED | OUTPATIENT
Start: 2024-09-30 | End: 2024-10-14

## 2024-10-02 ENCOUNTER — TELEPHONE (OUTPATIENT)
Age: 49
End: 2024-10-02

## 2024-10-02 DIAGNOSIS — A04.8 H. PYLORI INFECTION: Primary | ICD-10-CM

## 2024-10-02 RX ORDER — PANTOPRAZOLE SODIUM 40 MG/1
40 TABLET, DELAYED RELEASE ORAL
Qty: 60 TABLET | Refills: 0 | Status: SHIPPED | OUTPATIENT
Start: 2024-10-02 | End: 2024-11-01

## 2024-10-02 RX ORDER — BISMUTH SUBCITRATE POTASSIUM, METRONIDAZOLE, TETRACYCLINE HYDROCHLORIDE 140; 125; 125 MG/1; MG/1; MG/1
3 CAPSULE ORAL
Qty: 120 CAPSULE | Refills: 0 | Status: SHIPPED | OUTPATIENT
Start: 2024-10-02 | End: 2024-10-12

## 2024-10-02 NOTE — TELEPHONE ENCOUNTER
Prior auth submitted in epic denied. Denial as listed: We have denied coverage for the medical services/items listed above for the following reasons: The request did not meet the established medical necessity criteria or guidelines at this time. The request does not meet your health plan’s rules for an exception to the preferred drug list (also known as the formulary). Other medications are available on the preferred drug list. Per your health plan's criteria, this drug is covered if you meet the following: One of the following: (1) You have failed the preferred drug (as confirmed by claims history or submission of medical records). The preferred drugs: Pylera. (2) You cannot use the preferred drug (please specify contraindication or intolerance). The information provided does not show that you meet the criteria listed above. Please speak with your doctor about your choices. This decision was made per the [facility] Non-Preferred Drug Guideline.

## 2024-10-03 ENCOUNTER — TELEPHONE (OUTPATIENT)
Age: 49
End: 2024-10-03

## 2024-10-03 NOTE — TELEPHONE ENCOUNTER
Patient identified with two patient identifiers. Patient questions reason for prescription.  Patient informed h-pylori breath test completed was positive. Provider sent in treatment for bacteria.  Patient states his pharmacy informed him they should have it in 1-2 days.  Patient advised to have them check other location or if he finds an alternate pharmacy that has in stock her can return call to update.  Patient expressed understanding.

## 2024-10-03 NOTE — TELEPHONE ENCOUNTER
Patient's prescription is unavailable at the pharmacy, they state it should be ready in 1-2 days. He also has a question about why the medication was prescribed

## 2024-10-15 DIAGNOSIS — Z79.4 TYPE 2 DIABETES MELLITUS WITH HYPERGLYCEMIA, WITH LONG-TERM CURRENT USE OF INSULIN (HCC): ICD-10-CM

## 2024-10-15 DIAGNOSIS — E11.65 TYPE 2 DIABETES MELLITUS WITH HYPERGLYCEMIA, WITH LONG-TERM CURRENT USE OF INSULIN (HCC): ICD-10-CM

## 2024-10-15 RX ORDER — PEN NEEDLE, DIABETIC 32 GX 1/4"
NEEDLE, DISPOSABLE MISCELLANEOUS
Qty: 100 EACH | Refills: 0 | Status: SHIPPED | OUTPATIENT
Start: 2024-10-15

## 2024-10-16 ENCOUNTER — TELEPHONE (OUTPATIENT)
Age: 49
End: 2024-10-16

## 2024-10-16 ENCOUNTER — OFFICE VISIT (OUTPATIENT)
Age: 49
End: 2024-10-16

## 2024-10-16 DIAGNOSIS — E66.01 MORBID OBESITY: Primary | ICD-10-CM

## 2024-10-16 NOTE — PROGRESS NOTES
[facility] at [facility]  Supervised Weight Loss     Date:   10/16/2024    Patient's Name: Queenie Hurley  : 1975    Insurance:  [facility]                                Session:   Surgery: Gastric Bypass                  Surgeon:  Bowen Gonsalez M.D.      Height: 6'                    Reported Weight:    371      Lbs.                          BMI: 50              Pounds Lost since last month: 2#               Pounds Gained since last month: 0#     Starting Weight: 386#                       Previous Month’s Weight: 373#  Overall Pounds Lost: 15#                Overall Pounds Gained: 0     Other Pertinent Information: Today's appointment was completed over the phone. Pt recommended to lose 18# prior to surgery approval. Reports back pain and spasms has been very limiting.     Smoking Status:  none  Alcohol Intake: none    I have reviewed with pt the guidelines of the supervised wt loss program.  Pt understands the expectations of some wt loss during the program and that wt gain could delay the process. I have also explained that appointments need to be consecutive and missing an appointment may result in starting over. Pt has received this information in writing.          Changes that patient has made since last month include:  maintaining previously made lifestyle changes.      Eating Habits and Behaviors  General healthy eating guidelines were also discussed. Pts were instructed that their plate should be made up 1/2 plate coming from non-starchy vegetables, 1/4 coming from lean meat, and 1/4 of their plate coming from carbohydrates, including fruits, starches, or milk. We discussed measuring meals to 1/2 cup total per meal after surgery. Drinking only calorie-free, sugar-free and non-carbonated beverages. We discussed the importance of drinking 64 ounces of fluid per day to prevent dehydration post-operatively.                       Patient's current diet habits include:

## 2024-10-16 NOTE — TELEPHONE ENCOUNTER
Identified patient with two patient identifiers (name and ). Reviewed chart in preparation for encounter and have obtained necessary documentation.    Called and spoke with patient, he is scheduled for his final review on  10/29/24 at 11:00 AM. Patient understood what was discussed and thankful for the call.

## 2024-10-17 ENCOUNTER — APPOINTMENT (OUTPATIENT)
Facility: HOSPITAL | Age: 49
End: 2024-10-17
Payer: MEDICAID

## 2024-10-17 ENCOUNTER — HOSPITAL ENCOUNTER (EMERGENCY)
Facility: HOSPITAL | Age: 49
Discharge: HOME | End: 2024-10-17
Attending: EMERGENCY MEDICINE
Payer: MEDICAID

## 2024-10-17 VITALS
RESPIRATION RATE: 18 BRPM | TEMPERATURE: 98 F | WEIGHT: 315 LBS | BODY MASS INDEX: 42.66 KG/M2 | SYSTOLIC BLOOD PRESSURE: 163 MMHG | DIASTOLIC BLOOD PRESSURE: 97 MMHG | OXYGEN SATURATION: 99 % | HEART RATE: 78 BPM | HEIGHT: 72 IN

## 2024-10-17 DIAGNOSIS — M54.6 ACUTE BILATERAL THORACIC BACK PAIN: Primary | ICD-10-CM

## 2024-10-17 PROCEDURE — 6360000002 HC RX W HCPCS: Performed by: EMERGENCY MEDICINE

## 2024-10-17 PROCEDURE — 71046 X-RAY EXAM CHEST 2 VIEWS: CPT

## 2024-10-17 PROCEDURE — 96372 THER/PROPH/DIAG INJ SC/IM: CPT

## 2024-10-17 PROCEDURE — 99284 EMERGENCY DEPT VISIT MOD MDM: CPT

## 2024-10-17 PROCEDURE — 6370000000 HC RX 637 (ALT 250 FOR IP): Performed by: EMERGENCY MEDICINE

## 2024-10-17 RX ORDER — KETOROLAC TROMETHAMINE 30 MG/ML
30 INJECTION, SOLUTION INTRAMUSCULAR; INTRAVENOUS ONCE
Status: COMPLETED | OUTPATIENT
Start: 2024-10-17 | End: 2024-10-17

## 2024-10-17 RX ORDER — METHOCARBAMOL 500 MG/1
1000 TABLET, FILM COATED ORAL ONCE
Status: COMPLETED | OUTPATIENT
Start: 2024-10-17 | End: 2024-10-17

## 2024-10-17 RX ORDER — ACETAMINOPHEN 500 MG
1000 TABLET ORAL
Status: COMPLETED | OUTPATIENT
Start: 2024-10-17 | End: 2024-10-17

## 2024-10-17 RX ADMIN — KETOROLAC TROMETHAMINE 30 MG: 30 INJECTION, SOLUTION INTRAMUSCULAR at 12:16

## 2024-10-17 RX ADMIN — METHOCARBAMOL TABLETS 1000 MG: 500 TABLET, COATED ORAL at 12:17

## 2024-10-17 RX ADMIN — ACETAMINOPHEN 1000 MG: 500 TABLET ORAL at 12:17

## 2024-10-17 ASSESSMENT — PAIN - FUNCTIONAL ASSESSMENT: PAIN_FUNCTIONAL_ASSESSMENT: 0-10

## 2024-10-17 ASSESSMENT — PAIN SCALES - GENERAL: PAINLEVEL_OUTOF10: 7

## 2024-10-17 NOTE — ED TRIAGE NOTES
Patient ambulatory to ED c/o acute on chronic back pain. Follows with spine specialist. C/o upper back pain that causes muscle spasms.   Has taken tramadol, lyrica and robaxin without relief.

## 2024-10-17 NOTE — DISCHARGE INSTRUCTIONS
You have been evaluated in the Emergency Department today for upper back pain. Your evaluation did not find evidence of medical conditions requiring emergent intervention at this time.    You may take acetaminophen (Tylenol), either 3 regular strength (325mg) tablets or 2 extra-strength (500mg) tablets every 6 hours as well as an anti-inflammatory, either prescribed (Voltaren, Mobic) or over-the-counter (2 naproxen aka Aleve every 12 hours OR 3 ibuprofen aka Motrin every 6 hours) as needed for pain. Taking both the Tylenol as well as anti-inflammatory medications in combination can be especially effective.    Attempt to move and stretch as able.  Apply ice or heat every 6-8 hours for 20 minutes as needed for pain.     Return to the Emergency Department if you experience worsening pain, numbness, tingling, change of color in your limbs, or any other concerning symptoms.

## 2024-10-17 NOTE — ED PROVIDER NOTES
insulin (HCC)      bismuth-metroNIDAZOLE-tetracycline (PLYERA) 140-125-125 MG per capsule Take 3 capsules by mouth 4 times daily (before meals and nightly) for 10 days  Qty: 120 capsule, Refills: 0    Associated Diagnoses: H. pylori infection      pantoprazole (PROTONIX) 40 MG tablet Take 1 tablet by mouth 2 times daily (before meals)  Qty: 60 tablet, Refills: 0    Associated Diagnoses: H. pylori infection      nystatin (MYCOSTATIN) 393605 UNIT/GM ointment APPLY  OINTMENT TOPICALLY TO AFFECTED AREA TWICE DAILY  Qty: 30 g, Refills: 0    Associated Diagnoses: Candidiasis      nystatin (MYCOSTATIN) 130246 UNIT/GM powder APPLY  POWDER TOPICALLY TO AFFECTED AREA THREE TIMES DAILY  Qty: 60 g, Refills: 0    Associated Diagnoses: Candidiasis      dulaglutide (TRULICITY) 1.5 MG/0.5ML SC injection Inject 0.5 mLs into the skin once a week  Qty: 6 mL, Refills: 1    Associated Diagnoses: Type 2 diabetes mellitus with hyperglycemia, with long-term current use of insulin ([facility])      polyethylene glycol (GLYCOLAX) 17 GM/SCOOP powder TAKE 17 GRAMS BY MOUTH DAILY AS NEEDED FOR CONSTIPATION  Qty: 1020 g, Refills: 2      Tirzepatide (MOUNJARO) 7.5 MG/0.5ML SOPN SC injection Inject 0.5 mLs into the skin once a week  Qty: 2 mL, Refills: 0    Associated Diagnoses: Type 2 diabetes mellitus with hyperglycemia, with long-term current use of insulin (HCC)      Tirzepatide (MOUNJARO) 5 MG/0.5ML SOPN SC injection Inject 0.5 mLs into the skin once a week  Qty: 2 mL, Refills: 0    Associated Diagnoses: Type 2 diabetes mellitus with hyperglycemia, with long-term current use of insulin ([facility])      Tirzepatide (MOUNJARO) 2.5 MG/0.5ML SOPN SC injection Inject 0.5 mLs into the skin once a week  Qty: 2 mL, Refills: 0    Associated Diagnoses: Type 2 diabetes mellitus with hyperglycemia, with long-term current use of insulin ([facility])      atenolol (TENORMIN) 50 MG tablet Take 1 tablet by mouth daily  Qty: 90 tablet, Refills: 1    Associated Diagnoses:

## 2024-10-18 ENCOUNTER — TELEPHONE (OUTPATIENT)
Age: 49
End: 2024-10-18

## 2024-10-18 ENCOUNTER — HOSPITAL ENCOUNTER (EMERGENCY)
Facility: HOSPITAL | Age: 49
Discharge: HOME | End: 2024-10-18
Attending: STUDENT IN AN ORGANIZED HEALTH CARE EDUCATION/TRAINING PROGRAM
Payer: MEDICAID

## 2024-10-18 ENCOUNTER — APPOINTMENT (OUTPATIENT)
Facility: HOSPITAL | Age: 49
End: 2024-10-18
Payer: MEDICAID

## 2024-10-18 VITALS
WEIGHT: 315 LBS | SYSTOLIC BLOOD PRESSURE: 157 MMHG | RESPIRATION RATE: 16 BRPM | DIASTOLIC BLOOD PRESSURE: 121 MMHG | OXYGEN SATURATION: 94 % | BODY MASS INDEX: 42.66 KG/M2 | HEIGHT: 72 IN | HEART RATE: 100 BPM | TEMPERATURE: 98.8 F

## 2024-10-18 DIAGNOSIS — M54.6 ACUTE BILATERAL THORACIC BACK PAIN: Primary | ICD-10-CM

## 2024-10-18 LAB
ALBUMIN SERPL-MCNC: 3.9 G/DL (ref 3.5–5.2)
ALBUMIN/GLOB SERPL: 1.3 (ref 1.1–2.2)
ALP SERPL-CCNC: 96 U/L (ref 40–129)
ALT SERPL-CCNC: 22 U/L (ref 10–50)
ANION GAP SERPL CALC-SCNC: 8 MMOL/L (ref 2–12)
AST SERPL-CCNC: 22 U/L (ref 10–50)
BASOPHILS # BLD: 0.1 K/UL (ref 0–0.1)
BASOPHILS NFR BLD: 1 % (ref 0–1)
BILIRUB SERPL-MCNC: 0.2 MG/DL (ref 0.2–1)
BUN SERPL-MCNC: 13 MG/DL (ref 6–20)
BUN/CREAT SERPL: 14 (ref 12–20)
CALCIUM SERPL-MCNC: 8.2 MG/DL (ref 8.6–10)
CHLORIDE SERPL-SCNC: 103 MMOL/L (ref 98–107)
CO2 SERPL-SCNC: 30 MMOL/L (ref 22–29)
CREAT SERPL-MCNC: 0.92 MG/DL (ref 0.7–1.2)
D DIMER PPP FEU-MCNC: 0.44 UG/ML(FEU)
DIFFERENTIAL METHOD BLD: ABNORMAL
EOSINOPHIL # BLD: 0.2 K/UL (ref 0–0.4)
EOSINOPHIL NFR BLD: 2 % (ref 0–7)
ERYTHROCYTE [DISTWIDTH] IN BLOOD BY AUTOMATED COUNT: 15.2 % (ref 11.5–14.5)
GLOBULIN SER CALC-MCNC: 3 G/DL (ref 2–4)
GLUCOSE SERPL-MCNC: 192 MG/DL (ref 65–100)
HCT VFR BLD AUTO: 45.2 % (ref 36.6–50.3)
HGB BLD-MCNC: 14.3 G/DL (ref 12.1–17)
IMM GRANULOCYTES # BLD AUTO: 0 K/UL
IMM GRANULOCYTES NFR BLD AUTO: 0 %
LYMPHOCYTES # BLD: 6.6 K/UL (ref 0.8–3.5)
LYMPHOCYTES NFR BLD: 54 % (ref 12–49)
MCH RBC QN AUTO: 26.8 PG (ref 26–34)
MCHC RBC AUTO-ENTMCNC: 31.6 G/DL (ref 30–36.5)
MCV RBC AUTO: 84.6 FL (ref 80–99)
MONOCYTES # BLD: 1 K/UL (ref 0–1)
MONOCYTES NFR BLD: 8 % (ref 5–13)
NEUTS SEG # BLD: 4.3 K/UL (ref 1.8–8)
NEUTS SEG NFR BLD: 35 % (ref 32–75)
NRBC # BLD: 0 K/UL (ref 0–0.01)
NRBC BLD-RTO: 0 PER 100 WBC
PLATELET # BLD AUTO: 274 K/UL (ref 150–400)
PLATELET COMMENT: ABNORMAL
PMV BLD AUTO: 10.1 FL (ref 8.9–12.9)
POTASSIUM SERPL-SCNC: 3.8 MMOL/L (ref 3.5–5.1)
PROT SERPL-MCNC: 6.9 G/DL (ref 6.4–8.3)
RBC # BLD AUTO: 5.34 M/UL (ref 4.1–5.7)
RBC MORPH BLD: ABNORMAL
SODIUM SERPL-SCNC: 141 MMOL/L (ref 136–145)
TROPONIN T SERPL HS-MCNC: 9.7 NG/L (ref 0–22)
WBC # BLD AUTO: 12.2 K/UL (ref 4.1–11.1)
WBC MORPH BLD: ABNORMAL

## 2024-10-18 PROCEDURE — 93005 ELECTROCARDIOGRAM TRACING: CPT

## 2024-10-18 PROCEDURE — 96375 TX/PRO/DX INJ NEW DRUG ADDON: CPT

## 2024-10-18 PROCEDURE — 6370000000 HC RX 637 (ALT 250 FOR IP)

## 2024-10-18 PROCEDURE — 80053 COMPREHEN METABOLIC PANEL: CPT

## 2024-10-18 PROCEDURE — 71046 X-RAY EXAM CHEST 2 VIEWS: CPT

## 2024-10-18 PROCEDURE — 96374 THER/PROPH/DIAG INJ IV PUSH: CPT

## 2024-10-18 PROCEDURE — 85025 COMPLETE CBC W/AUTO DIFF WBC: CPT

## 2024-10-18 PROCEDURE — 36415 COLL VENOUS BLD VENIPUNCTURE: CPT

## 2024-10-18 PROCEDURE — 84484 ASSAY OF TROPONIN QUANT: CPT

## 2024-10-18 PROCEDURE — 85379 FIBRIN DEGRADATION QUANT: CPT

## 2024-10-18 PROCEDURE — 6360000002 HC RX W HCPCS

## 2024-10-18 PROCEDURE — 99285 EMERGENCY DEPT VISIT HI MDM: CPT

## 2024-10-18 RX ORDER — ACETAMINOPHEN 500 MG
1000 TABLET ORAL
Status: COMPLETED | OUTPATIENT
Start: 2024-10-18 | End: 2024-10-18

## 2024-10-18 RX ORDER — CYCLOBENZAPRINE HCL 5 MG
5 TABLET ORAL 2 TIMES DAILY PRN
Qty: 20 TABLET | Refills: 0 | Status: SHIPPED | OUTPATIENT
Start: 2024-10-18 | End: 2024-10-28

## 2024-10-18 RX ORDER — KETOROLAC TROMETHAMINE 30 MG/ML
30 INJECTION, SOLUTION INTRAMUSCULAR; INTRAVENOUS
Status: COMPLETED | OUTPATIENT
Start: 2024-10-18 | End: 2024-10-18

## 2024-10-18 RX ORDER — DIAZEPAM 5 MG/1
5 TABLET ORAL ONCE
Status: COMPLETED | OUTPATIENT
Start: 2024-10-18 | End: 2024-10-18

## 2024-10-18 RX ORDER — LIDOCAINE 4 G/G
1 PATCH TOPICAL ONCE
Status: DISCONTINUED | OUTPATIENT
Start: 2024-10-18 | End: 2024-10-18 | Stop reason: HOSPADM

## 2024-10-18 RX ORDER — METHOCARBAMOL 500 MG/1
1000 TABLET, FILM COATED ORAL
Status: COMPLETED | OUTPATIENT
Start: 2024-10-18 | End: 2024-10-18

## 2024-10-18 RX ADMIN — KETOROLAC TROMETHAMINE 30 MG: 30 INJECTION, SOLUTION INTRAMUSCULAR at 14:11

## 2024-10-18 RX ADMIN — HYDROMORPHONE HYDROCHLORIDE 1 MG: 1 INJECTION, SOLUTION INTRAMUSCULAR; INTRAVENOUS; SUBCUTANEOUS at 16:50

## 2024-10-18 RX ADMIN — ACETAMINOPHEN 1000 MG: 500 TABLET ORAL at 14:10

## 2024-10-18 RX ADMIN — DIAZEPAM 5 MG: 5 TABLET ORAL at 15:36

## 2024-10-18 RX ADMIN — METHOCARBAMOL TABLETS 1000 MG: 500 TABLET, COATED ORAL at 14:10

## 2024-10-18 ASSESSMENT — PAIN SCALES - GENERAL
PAINLEVEL_OUTOF10: 8
PAINLEVEL_OUTOF10: 10

## 2024-10-18 ASSESSMENT — PAIN - FUNCTIONAL ASSESSMENT: PAIN_FUNCTIONAL_ASSESSMENT: 0-10

## 2024-10-18 ASSESSMENT — PAIN DESCRIPTION - LOCATION: LOCATION: BACK
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
## 2024-10-18 NOTE — TELEPHONE ENCOUNTER
Identified patient with two patient identifiers (name and ). Reviewed chart in preparation for encounter and have obtained necessary documentation.    Called and spoke with patient regarding his psych eval. Joanna is unsure of who he spoke with for his eval. Looks like he had a cancelled appointment on 24 with gudelia. I have reached out to [employer] to see if they have the patients psych eval. Informed patient to try to reach out to [employer] and see if he saw anyone there.

## 2024-10-18 NOTE — DISCHARGE INSTRUCTIONS
Discussed visit today. Please try the Flexeril instead of the Robaxin for pain relief. Continue taking everything at home.     Return to the ER with any worsening of symptoms.

## 2024-10-18 NOTE — ED TRIAGE NOTES
Pt ambulatory in ED with c/o left sided back pain. Pt was seen for same at this facility yesterday and said that it was better yesterday and then it started back last night and was worse this morning,

## 2024-10-18 NOTE — TELEPHONE ENCOUNTER
Attempted to call patient in regards to his psych eval that we have received from [employer]. [facility] to return call.

## 2024-10-19 LAB
EKG ATRIAL RATE: 85 BPM
EKG DIAGNOSIS: NORMAL
EKG P AXIS: 62 DEGREES
EKG P-R INTERVAL: 190 MS
EKG Q-T INTERVAL: 364 MS
EKG QRS DURATION: 70 MS
EKG QTC CALCULATION (BAZETT): 433 MS
EKG R AXIS: 42 DEGREES
EKG T AXIS: 52 DEGREES
EKG VENTRICULAR RATE: 85 BPM

## 2024-10-19 PROCEDURE — 93010 ELECTROCARDIOGRAM REPORT: CPT | Performed by: SPECIALIST

## 2024-10-29 ENCOUNTER — OFFICE VISIT (OUTPATIENT)
Age: 49
End: 2024-10-29
Payer: MEDICAID

## 2024-10-29 VITALS
BODY MASS INDEX: 42.66 KG/M2 | HEIGHT: 72 IN | WEIGHT: 315 LBS | SYSTOLIC BLOOD PRESSURE: 155 MMHG | RESPIRATION RATE: 18 BRPM | TEMPERATURE: 98.8 F | HEART RATE: 104 BPM | DIASTOLIC BLOOD PRESSURE: 92 MMHG | OXYGEN SATURATION: 98 %

## 2024-10-29 DIAGNOSIS — Z79.4 TYPE 2 DIABETES MELLITUS WITH HYPERGLYCEMIA, WITH LONG-TERM CURRENT USE OF INSULIN (HCC): ICD-10-CM

## 2024-10-29 DIAGNOSIS — G89.29 CHRONIC MIDLINE LOW BACK PAIN WITH RIGHT-SIDED SCIATICA: ICD-10-CM

## 2024-10-29 DIAGNOSIS — I10 PRIMARY HYPERTENSION: ICD-10-CM

## 2024-10-29 DIAGNOSIS — E11.65 TYPE 2 DIABETES MELLITUS WITH HYPERGLYCEMIA, WITH LONG-TERM CURRENT USE OF INSULIN (HCC): ICD-10-CM

## 2024-10-29 DIAGNOSIS — M54.41 CHRONIC MIDLINE LOW BACK PAIN WITH RIGHT-SIDED SCIATICA: ICD-10-CM

## 2024-10-29 DIAGNOSIS — G47.33 OSA (OBSTRUCTIVE SLEEP APNEA): ICD-10-CM

## 2024-10-29 DIAGNOSIS — E66.01 MORBID OBESITY: Primary | ICD-10-CM

## 2024-10-29 DIAGNOSIS — E78.5 DYSLIPIDEMIA, GOAL LDL BELOW 100: ICD-10-CM

## 2024-10-29 PROCEDURE — 3077F SYST BP >= 140 MM HG: CPT | Performed by: SURGERY

## 2024-10-29 PROCEDURE — 3051F HG A1C>EQUAL 7.0%<8.0%: CPT | Performed by: SURGERY

## 2024-10-29 PROCEDURE — 3080F DIAST BP >= 90 MM HG: CPT | Performed by: SURGERY

## 2024-10-29 PROCEDURE — 99213 OFFICE O/P EST LOW 20 MIN: CPT | Performed by: SURGERY

## 2024-10-29 RX ORDER — CYCLOBENZAPRINE HCL 10 MG
10 TABLET ORAL 3 TIMES DAILY PRN
COMMUNITY

## 2024-10-29 ASSESSMENT — PATIENT HEALTH QUESTIONNAIRE - PHQ9
1. LITTLE INTEREST OR PLEASURE IN DOING THINGS: NOT AT ALL
SUM OF ALL RESPONSES TO PHQ QUESTIONS 1-9: 0
SUM OF ALL RESPONSES TO PHQ QUESTIONS 1-9: 0
SUM OF ALL RESPONSES TO PHQ9 QUESTIONS 1 & 2: 0
SUM OF ALL RESPONSES TO PHQ QUESTIONS 1-9: 0
2. FEELING DOWN, DEPRESSED OR HOPELESS: NOT AT ALL
SUM OF ALL RESPONSES TO PHQ QUESTIONS 1-9: 0
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
## 2024-10-29 NOTE — PROGRESS NOTES
Identified patient with two patient identifiers (name and ). Reviewed chart in preparation for visit and have obtained necessary documentation.    Queenie Hurley is a 49 y.o. male  Chief Complaint   Patient presents with    Weight Management     Final review     BP (!) 155/92 (Site: Left Upper Arm, Position: Sitting, Cuff Size: Large Adult)   Pulse (!) 104   Temp 98.8 °F (37.1 °C)   Resp 18   Ht 1.829 m (6')   Wt (!) 167.4 kg (369 lb)   SpO2 98%   BMI 50.05 kg/m²     1. Have you been to the ER, urgent care clinic since your last visit?  Hospitalized since your last visit?yes ED for Blood infection and back pain x 2 last week    2. Have you seen or consulted any other health care providers outside of the [facility] since your last visit?  Include any pap smears or colon screening. Spine specialist

## 2024-10-30 ENCOUNTER — CLINICAL DOCUMENTATION (OUTPATIENT)
Age: 49
End: 2024-10-30

## 2024-10-30 NOTE — PROGRESS NOTES
Submitted Authorization to [facility] via online for  ROBOTIC ASSISTED LAPAROSCOPIC GASTRIC BYPASS with Dr. Gonsalez    Pending auth #  [id number]

## 2024-11-04 DIAGNOSIS — B37.9 CANDIDIASIS: ICD-10-CM

## 2024-11-05 ENCOUNTER — PREP FOR PROCEDURE (OUTPATIENT)
Age: 49
End: 2024-11-05

## 2024-11-05 ENCOUNTER — TELEPHONE (OUTPATIENT)
Age: 49
End: 2024-11-05

## 2024-11-05 DIAGNOSIS — K62.89 CHRONIC IDIOPATHIC ANAL PAIN: ICD-10-CM

## 2024-11-05 DIAGNOSIS — G89.29 CHRONIC IDIOPATHIC ANAL PAIN: ICD-10-CM

## 2024-11-05 DIAGNOSIS — M54.41 ACUTE BACK PAIN WITH SCIATICA, RIGHT: ICD-10-CM

## 2024-11-05 DIAGNOSIS — Z79.4 ENCOUNTER FOR LONG-TERM (CURRENT) USE OF INSULIN (HCC): ICD-10-CM

## 2024-11-05 DIAGNOSIS — E11.65 INADEQUATELY CONTROLLED DIABETES MELLITUS (HCC): ICD-10-CM

## 2024-11-05 PROBLEM — E78.5 HYPERLIPEMIA: Status: ACTIVE | Noted: 2024-11-05

## 2024-11-05 RX ORDER — NYSTATIN 100000 U/G
OINTMENT TOPICAL
Qty: 30 G | Refills: 0 | Status: SHIPPED | OUTPATIENT
Start: 2024-11-05

## 2024-11-05 NOTE — TELEPHONE ENCOUNTER
Spoke to patient, I offered 12/12 for surgery and he accepted.  I let him know that I will be scheduling his pre-op appointments which are: virtual diet and info class, PAT, H&P and to meet with the doctor to sign consent.  That is any of these appointments are no showed or rescheduled it can push out his surgery date.  He understood.  I also let them know that I will be scheduling their follow up appointments after surgery.   That once everything is scheduled I will put all the information in a letter with dates, times, who they are going to see and if it is virtual or in person.  This letter will post to your my chart and I will send it out in the mail.  I will also call you once it is completed.  You will hear from me by end of day today.  He acknowledged ok and thank you

## 2024-11-05 NOTE — TELEPHONE ENCOUNTER
LM for patient to let him know his surgery letter has posted to his my chart and will go out in the mail. I reviewed the diet and info class that that won't show up in his my chart.  I informed his to be on the look out for an e-mail from [employer] in your Blurbk/spam folder.  I explained what will be in the e-mail and to please reply to it so we know you received it.  To call me if there is any scheduling questions or conflicts

## 2024-11-13 ENCOUNTER — HOSPITAL ENCOUNTER (OUTPATIENT)
Facility: HOSPITAL | Age: 49
Discharge: HOME | End: 2024-11-16
Payer: MEDICAID

## 2024-11-13 VITALS
BODY MASS INDEX: 42.66 KG/M2 | HEART RATE: 84 BPM | TEMPERATURE: 98.6 F | WEIGHT: 315 LBS | SYSTOLIC BLOOD PRESSURE: 122 MMHG | HEIGHT: 72 IN | DIASTOLIC BLOOD PRESSURE: 83 MMHG

## 2024-11-13 LAB
ALBUMIN SERPL-MCNC: 3.7 G/DL (ref 3.5–5)
ALBUMIN/GLOB SERPL: 1.1 (ref 1.1–2.2)
ALP SERPL-CCNC: 78 U/L (ref 45–117)
ALT SERPL-CCNC: 51 U/L (ref 12–78)
ANION GAP SERPL CALC-SCNC: 5 MMOL/L (ref 2–12)
APPEARANCE UR: CLEAR
AST SERPL-CCNC: 17 U/L (ref 15–37)
BACTERIA URNS QL MICRO: NEGATIVE /HPF
BASOPHILS # BLD: 0 K/UL (ref 0–0.1)
BASOPHILS NFR BLD: 0 % (ref 0–1)
BILIRUB SERPL-MCNC: 0.4 MG/DL (ref 0.2–1)
BILIRUB UR QL: NEGATIVE
BUN SERPL-MCNC: 25 MG/DL (ref 6–20)
BUN/CREAT SERPL: 17 (ref 12–20)
CALCIUM SERPL-MCNC: 9 MG/DL (ref 8.5–10.1)
CHLORIDE SERPL-SCNC: 105 MMOL/L (ref 97–108)
CO2 SERPL-SCNC: 28 MMOL/L (ref 21–32)
COLOR UR: ABNORMAL
CREAT SERPL-MCNC: 1.43 MG/DL (ref 0.7–1.3)
DIFFERENTIAL METHOD BLD: ABNORMAL
EOSINOPHIL # BLD: 0 K/UL (ref 0–0.4)
EOSINOPHIL NFR BLD: 0 % (ref 0–7)
EPITH CASTS URNS QL MICRO: ABNORMAL /LPF
ERYTHROCYTE [DISTWIDTH] IN BLOOD BY AUTOMATED COUNT: 15.7 % (ref 11.5–14.5)
EST. AVERAGE GLUCOSE BLD GHB EST-MCNC: 160 MG/DL
GLOBULIN SER CALC-MCNC: 3.3 G/DL (ref 2–4)
GLUCOSE SERPL-MCNC: 154 MG/DL (ref 65–100)
GLUCOSE UR STRIP.AUTO-MCNC: >1000 MG/DL
HBA1C MFR BLD: 7.2 % (ref 4–5.6)
HCT VFR BLD AUTO: 46.8 % (ref 36.6–50.3)
HGB BLD-MCNC: 14.8 G/DL (ref 12.1–17)
HGB UR QL STRIP: NEGATIVE
IMM GRANULOCYTES # BLD AUTO: 0 K/UL
IMM GRANULOCYTES NFR BLD AUTO: 0 %
KETONES UR QL STRIP.AUTO: ABNORMAL MG/DL
LEUKOCYTE ESTERASE UR QL STRIP.AUTO: NEGATIVE
LYMPHOCYTES # BLD: 6.5 K/UL (ref 0.8–3.5)
LYMPHOCYTES NFR BLD: 57 % (ref 12–49)
MAGNESIUM SERPL-MCNC: 2.1 MG/DL (ref 1.6–2.4)
MCH RBC QN AUTO: 26.5 PG (ref 26–34)
MCHC RBC AUTO-ENTMCNC: 31.6 G/DL (ref 30–36.5)
MCV RBC AUTO: 83.9 FL (ref 80–99)
MONOCYTES # BLD: 0.7 K/UL (ref 0–1)
MONOCYTES NFR BLD: 6 % (ref 5–13)
NEUTS SEG # BLD: 4.3 K/UL (ref 1.8–8)
NEUTS SEG NFR BLD: 37 % (ref 32–75)
NITRITE UR QL STRIP.AUTO: NEGATIVE
NRBC # BLD: 0 K/UL (ref 0–0.01)
NRBC BLD-RTO: 0 PER 100 WBC
PH UR STRIP: 5 (ref 5–8)
PLATELET # BLD AUTO: 261 K/UL (ref 150–400)
PMV BLD AUTO: 10.7 FL (ref 8.9–12.9)
POTASSIUM SERPL-SCNC: 3.9 MMOL/L (ref 3.5–5.1)
PROT SERPL-MCNC: 7 G/DL (ref 6.4–8.2)
PROT UR STRIP-MCNC: NEGATIVE MG/DL
RBC # BLD AUTO: 5.58 M/UL (ref 4.1–5.7)
RBC #/AREA URNS HPF: ABNORMAL /HPF (ref 0–5)
RBC MORPH BLD: ABNORMAL
SODIUM SERPL-SCNC: 138 MMOL/L (ref 136–145)
SP GR UR REFRACTOMETRY: >1.03
T4 FREE SERPL-MCNC: 0.7 NG/DL (ref 0.8–1.5)
TSH SERPL DL<=0.05 MIU/L-ACNC: 2.09 UIU/ML (ref 0.36–3.74)
URINE CULTURE IF INDICATED: ABNORMAL
UROBILINOGEN UR QL STRIP.AUTO: 0.2 EU/DL (ref 0.2–1)
WBC # BLD AUTO: 11.5 K/UL (ref 4.1–11.1)
WBC URNS QL MICRO: ABNORMAL /HPF (ref 0–4)

## 2024-11-13 PROCEDURE — 84439 ASSAY OF FREE THYROXINE: CPT

## 2024-11-13 PROCEDURE — 36415 COLL VENOUS BLD VENIPUNCTURE: CPT

## 2024-11-13 PROCEDURE — 81001 URINALYSIS AUTO W/SCOPE: CPT

## 2024-11-13 PROCEDURE — 80053 COMPREHEN METABOLIC PANEL: CPT

## 2024-11-13 PROCEDURE — 84443 ASSAY THYROID STIM HORMONE: CPT

## 2024-11-13 PROCEDURE — 83735 ASSAY OF MAGNESIUM: CPT

## 2024-11-13 PROCEDURE — 85025 COMPLETE CBC W/AUTO DIFF WBC: CPT

## 2024-11-13 PROCEDURE — 83036 HEMOGLOBIN GLYCOSYLATED A1C: CPT

## 2024-11-13 RX ORDER — ERGOCALCIFEROL 1.25 MG/1
50000 CAPSULE, LIQUID FILLED ORAL WEEKLY
Qty: 12 CAPSULE | Refills: 0 | Status: SHIPPED | OUTPATIENT
Start: 2024-11-13

## 2024-11-13 NOTE — PERIOP NOTE
PT WT .6 LBS. DAWOOD IN  MAIN OR NOTIFIED.    PT REPORTS SEEING CARDIOLOGIST, DR. MADHURI SUGGS \"A FEW YRS AGO\".  CARDIOLOGY NOTE IN CC ON 12/13/22.

## 2024-11-13 NOTE — PERIOP NOTE
[address] [address]   MAIN OR                                  [phone]    MAIN PRE OP             [phone]                                                                                AMBULATORY PRE OP          [phone]  PRE-ADMISSION TESTING    [phone]     Surgery Date:  12/12/24       Is surgery arrival time given by surgeon?    NO    If “NO”, [facility] staff will call you between 4 and 7pm the day before your surgery with your arrival time. (If your surgery is on a Monday, we will call you the Friday before.)    Call [phone] after 7pm Monday-Friday if you did not receive this call.    INSTRUCTIONS BEFORE YOUR SURGERY   When You  Arrive Arrive at [facility] Patient Access on 1st floor the day of your surgery.  Have your insurance card, photo ID,living will/advanced directive/POA (if applicable),  and any copayment (if needed)   Food   and   Drink SEE DIET INSTRUCTIONS BELOW     Absolutely NO alcohol of any kind 2 weeks before surgery and continue to avoid after surgery. Alcohol is not safe before or after surgery. Please discuss with your bariatric provider before resuming alcohol use. You must be 100% smoke free (tobacco and marijuana - smoking or edibles) for at least 3 months prior to surgery. Surgery will be cancelled if you are smoking. Stop smoking before surgery (helps w/healing and breathing).   Medications to   TAKE   Morning of Surgery MEDICATIONS TO TAKE THE MORNING OF SURGERY WITH A SIP OF WATER:     LYRICA, DULOXETINE, ALLEGRA, ATENOLOL, DILTIAZEM, OMEPRAZOLE    You may take these medications, IF NEEDED, the morning of surgery: TRAMADOL (4HRS PRIOR TO ARRIVAL TIME)  ALBUTEROL INHALER   Medications to STOP  before surgery Non-Steroidal anti-inflammatory Drugs (NSAID's): for example, Ibuprofen (Advil, Motrin), Naproxen (Aleve) 7 days    STOP all herbal supplements and vitamins(unless prescribed by your

## 2024-11-14 DIAGNOSIS — Z79.4 TYPE 2 DIABETES MELLITUS WITH HYPERGLYCEMIA, WITH LONG-TERM CURRENT USE OF INSULIN (HCC): ICD-10-CM

## 2024-11-14 DIAGNOSIS — E11.65 TYPE 2 DIABETES MELLITUS WITH HYPERGLYCEMIA, WITH LONG-TERM CURRENT USE OF INSULIN (HCC): ICD-10-CM

## 2024-11-15 RX ORDER — DULAGLUTIDE 3 MG/.5ML
INJECTION, SOLUTION SUBCUTANEOUS
Qty: 12 ML | Refills: 0 | Status: SHIPPED | OUTPATIENT
Start: 2024-11-15

## 2024-11-20 ENCOUNTER — TELEPHONE (OUTPATIENT)
Age: 49
End: 2024-11-20

## 2024-11-21 ENCOUNTER — TELEPHONE (OUTPATIENT)
Dept: PRIMARY CARE CLINIC | Facility: CLINIC | Age: 49
End: 2024-11-21

## 2024-11-21 ENCOUNTER — TELEMEDICINE (OUTPATIENT)
Age: 49
End: 2024-11-21

## 2024-11-21 VITALS
HEART RATE: 81 BPM | WEIGHT: 315 LBS | DIASTOLIC BLOOD PRESSURE: 74 MMHG | BODY MASS INDEX: 42.66 KG/M2 | HEIGHT: 72 IN | SYSTOLIC BLOOD PRESSURE: 158 MMHG

## 2024-11-21 DIAGNOSIS — E16.2 HYPOGLYCEMIA: ICD-10-CM

## 2024-11-21 DIAGNOSIS — E78.5 DYSLIPIDEMIA, GOAL LDL BELOW 100: ICD-10-CM

## 2024-11-21 DIAGNOSIS — K21.9 GASTROESOPHAGEAL REFLUX DISEASE WITHOUT ESOPHAGITIS: ICD-10-CM

## 2024-11-21 DIAGNOSIS — E66.01 MORBID OBESITY: Primary | ICD-10-CM

## 2024-11-21 DIAGNOSIS — G47.33 OSA (OBSTRUCTIVE SLEEP APNEA): ICD-10-CM

## 2024-11-21 DIAGNOSIS — E11.65 TYPE 2 DIABETES MELLITUS WITH HYPERGLYCEMIA, WITH LONG-TERM CURRENT USE OF INSULIN (HCC): ICD-10-CM

## 2024-11-21 DIAGNOSIS — Z79.4 TYPE 2 DIABETES MELLITUS WITH HYPERGLYCEMIA, WITH LONG-TERM CURRENT USE OF INSULIN (HCC): ICD-10-CM

## 2024-11-21 DIAGNOSIS — I10 PRIMARY HYPERTENSION: ICD-10-CM

## 2024-11-21 PROCEDURE — 99024 POSTOP FOLLOW-UP VISIT: CPT | Performed by: NURSE PRACTITIONER

## 2024-11-21 ASSESSMENT — PATIENT HEALTH QUESTIONNAIRE - PHQ9
SUM OF ALL RESPONSES TO PHQ QUESTIONS 1-9: 0
2. FEELING DOWN, DEPRESSED OR HOPELESS: NOT AT ALL
SUM OF ALL RESPONSES TO PHQ QUESTIONS 1-9: 0
SUM OF ALL RESPONSES TO PHQ QUESTIONS 1-9: 0
SUM OF ALL RESPONSES TO PHQ9 QUESTIONS 1 & 2: 0
1. LITTLE INTEREST OR PLEASURE IN DOING THINGS: NOT AT ALL
SUM OF ALL RESPONSES TO PHQ QUESTIONS 1-9: 0

## 2024-11-21 ASSESSMENT — PAIN SCALES - GENERAL: PAINLEVEL_OUTOF10: 8

## 2024-11-21 ASSESSMENT — PAIN DESCRIPTION - LOCATION: LOCATION: BACK

## 2024-11-21 NOTE — PROGRESS NOTES
Identified patient with two patient identifiers (name and ). Reviewed chart in preparation for visit and have obtained necessary documentation.    Queenie Hurley is a 49 y.o. male  Chief Complaint   Patient presents with    Follow-up     BP (!) 158/74   Pulse 81   Ht 1.829 m (6')   Wt (!) 167.4 kg (369 lb)   BMI 50.05 kg/m²     1. Have you been to the ER, urgent care clinic since your last visit?  Hospitalized since your last visit?no    2. Have you seen or consulted any other health care providers outside of the [facility] since your last visit?  Include any pap smears or colon screening. No    Patient reported hypoglycemic blood sugars. I recommended he reach out to his PCP or Endocrinologist. BP elevated and I recommended he monitor his BP and report to his PCP.   
the hospital.   8. GERD-   UGI  IMPRESSION:  Essentially normal.  Advised patient to stop Diclofenac 7 days prior to surgery and that he will not be able to take it after surgery.   Queenie Hurley, was evaluated through a synchronous (real-time) audio-video encounter. The patient (or guardian if applicable) is aware that this is a billable service, which includes applicable co-pays. This Virtual Visit was conducted with patient's (and/or legal guardian's) consent. Patient identification was verified, and a caregiver was present when appropriate.   The patient was located at Home: [address] [place] [address] [address]  [facility] [address]  Provider was located at Facility (Appt Dept): [address] [facility] [address]  [place] [address]  Confirm you are appropriately licensed, registered, or certified to deliver care in the state where the patient is located as indicated above. If you are not or unsure, please re-schedule the visit: Yes, I confirm.          --JAZMIN Ly NP on 11/21/2024 at 1:48 PM    An electronic signature was used to authenticate this note.      Pt verbalized understanding and questions were answered to the best of my knowledge and ability.          Signed By: JAZMIN Ly NP     November 21, 2024

## 2024-11-25 SDOH — ECONOMIC STABILITY: FOOD INSECURITY: WITHIN THE PAST 12 MONTHS, YOU WORRIED THAT YOUR FOOD WOULD RUN OUT BEFORE YOU GOT MONEY TO BUY MORE.: OFTEN TRUE

## 2024-11-25 SDOH — ECONOMIC STABILITY: INCOME INSECURITY: HOW HARD IS IT FOR YOU TO PAY FOR THE VERY BASICS LIKE FOOD, HOUSING, MEDICAL CARE, AND HEATING?: PATIENT DECLINED

## 2024-11-25 SDOH — ECONOMIC STABILITY: FOOD INSECURITY: WITHIN THE PAST 12 MONTHS, THE FOOD YOU BOUGHT JUST DIDN'T LAST AND YOU DIDN'T HAVE MONEY TO GET MORE.: OFTEN TRUE

## 2024-11-27 ENCOUNTER — OFFICE VISIT (OUTPATIENT)
Dept: PRIMARY CARE CLINIC | Facility: CLINIC | Age: 49
End: 2024-11-27
Payer: MEDICAID

## 2024-11-27 VITALS
TEMPERATURE: 97.5 F | WEIGHT: 315 LBS | HEIGHT: 72 IN | SYSTOLIC BLOOD PRESSURE: 132 MMHG | RESPIRATION RATE: 18 BRPM | BODY MASS INDEX: 42.66 KG/M2 | OXYGEN SATURATION: 98 % | HEART RATE: 76 BPM | DIASTOLIC BLOOD PRESSURE: 74 MMHG

## 2024-11-27 DIAGNOSIS — E78.5 DYSLIPIDEMIA, GOAL LDL BELOW 100: ICD-10-CM

## 2024-11-27 DIAGNOSIS — I10 ESSENTIAL (PRIMARY) HYPERTENSION: Primary | ICD-10-CM

## 2024-11-27 DIAGNOSIS — R00.0 TACHYCARDIA, UNSPECIFIED: ICD-10-CM

## 2024-11-27 DIAGNOSIS — Z79.4 TYPE 2 DIABETES MELLITUS WITH HYPERGLYCEMIA, WITH LONG-TERM CURRENT USE OF INSULIN (HCC): ICD-10-CM

## 2024-11-27 DIAGNOSIS — K21.9 GASTRO-ESOPHAGEAL REFLUX DISEASE WITHOUT ESOPHAGITIS: ICD-10-CM

## 2024-11-27 DIAGNOSIS — B37.9 CANDIDIASIS: ICD-10-CM

## 2024-11-27 DIAGNOSIS — E11.65 TYPE 2 DIABETES MELLITUS WITH HYPERGLYCEMIA, WITH LONG-TERM CURRENT USE OF INSULIN (HCC): ICD-10-CM

## 2024-11-27 DIAGNOSIS — G44.52 NEW DAILY PERSISTENT HEADACHE (NDPH): ICD-10-CM

## 2024-11-27 DIAGNOSIS — F33.1 MAJOR DEPRESSIVE DISORDER, RECURRENT, MODERATE (HCC): ICD-10-CM

## 2024-11-27 PROCEDURE — 99214 OFFICE O/P EST MOD 30 MIN: CPT | Performed by: NURSE PRACTITIONER

## 2024-11-27 PROCEDURE — 3075F SYST BP GE 130 - 139MM HG: CPT | Performed by: NURSE PRACTITIONER

## 2024-11-27 PROCEDURE — 3051F HG A1C>EQUAL 7.0%<8.0%: CPT | Performed by: NURSE PRACTITIONER

## 2024-11-27 PROCEDURE — 3078F DIAST BP <80 MM HG: CPT | Performed by: NURSE PRACTITIONER

## 2024-11-27 RX ORDER — NYSTATIN 100000 [USP'U]/G
POWDER TOPICAL
Qty: 60 G | Refills: 0 | Status: SHIPPED | OUTPATIENT
Start: 2024-11-27

## 2024-11-27 RX ORDER — INSULIN LISPRO 100 [IU]/ML
INJECTION, SOLUTION INTRAVENOUS; SUBCUTANEOUS
Qty: 30 ML | Refills: 3 | Status: SHIPPED | OUTPATIENT
Start: 2024-11-27

## 2024-11-27 RX ORDER — ROSUVASTATIN CALCIUM 10 MG/1
10 TABLET, COATED ORAL DAILY
Qty: 90 TABLET | Refills: 1 | Status: SHIPPED | OUTPATIENT
Start: 2024-11-27

## 2024-11-27 RX ORDER — LISINOPRIL 40 MG/1
40 TABLET ORAL DAILY
Qty: 90 TABLET | Refills: 0 | Status: SHIPPED | OUTPATIENT
Start: 2024-11-27

## 2024-11-27 RX ORDER — TOPIRAMATE 50 MG/1
50 TABLET, FILM COATED ORAL 2 TIMES DAILY
Qty: 180 TABLET | Refills: 1 | Status: SHIPPED | OUTPATIENT
Start: 2024-11-27

## 2024-11-27 RX ORDER — DULOXETIN HYDROCHLORIDE 60 MG/1
60 CAPSULE, DELAYED RELEASE ORAL DAILY
Qty: 90 CAPSULE | Refills: 1 | Status: SHIPPED | OUTPATIENT
Start: 2024-11-27

## 2024-11-27 RX ORDER — ATENOLOL 50 MG/1
50 TABLET ORAL DAILY
Qty: 90 TABLET | Refills: 1 | Status: SHIPPED | OUTPATIENT
Start: 2024-11-27

## 2024-11-27 RX ORDER — NYSTATIN 100000 U/G
OINTMENT TOPICAL
Qty: 30 G | Refills: 0 | Status: SHIPPED | OUTPATIENT
Start: 2024-11-27

## 2024-11-27 RX ORDER — HYDROCHLOROTHIAZIDE 25 MG/1
TABLET ORAL
Qty: 90 TABLET | Refills: 1 | Status: SHIPPED | OUTPATIENT
Start: 2024-11-27

## 2024-11-27 RX ORDER — DILTIAZEM HYDROCHLORIDE 240 MG/1
240 CAPSULE, COATED, EXTENDED RELEASE ORAL DAILY
Qty: 90 CAPSULE | Refills: 1 | Status: SHIPPED | OUTPATIENT
Start: 2024-11-27

## 2024-11-27 RX ORDER — SPIRONOLACTONE 25 MG/1
25 TABLET ORAL DAILY
Qty: 90 TABLET | Refills: 0 | Status: SHIPPED | OUTPATIENT
Start: 2024-11-27

## 2024-11-27 ASSESSMENT — PATIENT HEALTH QUESTIONNAIRE - PHQ9
7. TROUBLE CONCENTRATING ON THINGS, SUCH AS READING THE NEWSPAPER OR WATCHING TELEVISION: NOT AT ALL
SUM OF ALL RESPONSES TO PHQ QUESTIONS 1-9: 3
SUM OF ALL RESPONSES TO PHQ QUESTIONS 1-9: 3
1. LITTLE INTEREST OR PLEASURE IN DOING THINGS: NOT AT ALL
2. FEELING DOWN, DEPRESSED OR HOPELESS: SEVERAL DAYS
6. FEELING BAD ABOUT YOURSELF - OR THAT YOU ARE A FAILURE OR HAVE LET YOURSELF OR YOUR FAMILY DOWN: NOT AT ALL
9. THOUGHTS THAT YOU WOULD BE BETTER OFF DEAD, OR OF HURTING YOURSELF: NOT AT ALL
8. MOVING OR SPEAKING SO SLOWLY THAT OTHER PEOPLE COULD HAVE NOTICED. OR THE OPPOSITE, BEING SO FIGETY OR RESTLESS THAT YOU HAVE BEEN MOVING AROUND A LOT MORE THAN USUAL: NOT AT ALL
10. IF YOU CHECKED OFF ANY PROBLEMS, HOW DIFFICULT HAVE THESE PROBLEMS MADE IT FOR YOU TO DO YOUR WORK, TAKE CARE OF THINGS AT HOME, OR GET ALONG WITH OTHER PEOPLE: NOT DIFFICULT AT ALL
3. TROUBLE FALLING OR STAYING ASLEEP: SEVERAL DAYS
SUM OF ALL RESPONSES TO PHQ QUESTIONS 1-9: 3
5. POOR APPETITE OR OVEREATING: NOT AT ALL
SUM OF ALL RESPONSES TO PHQ QUESTIONS 1-9: 3
4. FEELING TIRED OR HAVING LITTLE ENERGY: SEVERAL DAYS
SUM OF ALL RESPONSES TO PHQ9 QUESTIONS 1 & 2: 1

## 2024-11-27 ASSESSMENT — ENCOUNTER SYMPTOMS: SHORTNESS OF BREATH: 0

## 2024-11-27 NOTE — PROGRESS NOTES
Queenie Hurley is a 49 y.o. male who was seen in clinic today (11/27/2024).    Assessment & Plan:   Below is the assessment and plan developed based on review of pertinent history, physical exam, labs, studies, and medications.    1. Essential (primary) hypertension  Comments:  stable on medication.  will continue to monitor post op  Orders:  -     lisinopril (PRINIVIL;ZESTRIL) 40 MG tablet; Take 1 tablet by mouth daily, Disp-90 tablet, R-0Normal  -     spironolactone (ALDACTONE) 25 MG tablet; Take 1 tablet by mouth daily, Disp-90 tablet, R-0Normal  -     atenolol (TENORMIN) 50 MG tablet; Take 1 tablet by mouth daily, Disp-90 tablet, R-1Normal  -     hydroCHLOROthiazide (HYDRODIURIL) 25 MG tablet; TAKE 1 TABLET BY MOUTH ONCE DAILY, Disp-90 tablet, R-1Normal  2. New daily persistent headache (ndph)  Comments:  stable on topomax  Orders:  -     topiramate (TOPAMAX) 50 MG tablet; Take 1 tablet by mouth 2 times daily, Disp-180 tablet, R-1Normal  -     atenolol (TENORMIN) 50 MG tablet; Take 1 tablet by mouth daily, Disp-90 tablet, R-1Normal  3. Tachycardia, unspecified  Comments:  stable on diltiazem.  Work up completed by cardiology, Dr. Chun.   Orders:  -     dilTIAZem (CARTIA XT) 240 MG extended release capsule; Take 1 capsule by mouth daily, Disp-90 capsule, R-1Normal  4. Dyslipidemia, goal LDL below 100  Comments:  due for fasting labs to assess.   contiue crestor  Orders:  -     Lipid Panel  -     rosuvastatin (CRESTOR) 10 MG tablet; Take 1 tablet by mouth daily, Disp-90 tablet, R-1Normal  5. Type 2 diabetes mellitus with hyperglycemia, with long-term current use of insulin (HCC)  Comments:  will start using a sliding scale due to going on liquid diet prior to sugery.  will hopefully be able to come off insulin.  continue jardiance.  hold GLP1  Orders:  -     Microalbumin / Creatinine Urine Ratio  -     insulin lispro, 1 Unit Dial, (HUMALOG/ADMELOG) 100 UNIT/ML SOPN; INJECT 20 UNITS SUBCUTANEOUSLY BEFORE BREAKFAST,

## 2024-11-27 NOTE — PROGRESS NOTES
\"Have you been to the ER, urgent care clinic since your last visit?  Hospitalized since your last visit?\"    YES - When: approximately 10/18/24 ago.  Where and Why: back pain.    “Have you seen or consulted any other health care providers outside our system since your last visit?”    NO      “Have you had a colorectal cancer screening such as a colonoscopy/FIT/Cologuard?    NO    No colonoscopy on file  No cologuard on file  No FIT/FOBT on file   No flexible sigmoidoscopy on file     “Have you had a diabetic eye exam?”    NO     Date of last diabetic eye exam: 8/14/2017

## 2024-11-27 NOTE — PATIENT INSTRUCTIONS
Insulin sliding scale.    Please check glucose prior to eating and adjust dose of insulin before each meal.        Sliding Scale:   Administer 0 units if <150   Administer 2 units 150-199   Administer 4 units 200-249   Administer 6 units 250-299   Administer 8 units 300-349   Administer 10 units >350

## 2024-11-28 LAB
CHOLEST SERPL-MCNC: 172 MG/DL (ref 100–199)
HDLC SERPL-MCNC: 39 MG/DL
LDLC SERPL CALC-MCNC: 119 MG/DL (ref 0–99)
TRIGL SERPL-MCNC: 76 MG/DL (ref 0–149)
VLDLC SERPL CALC-MCNC: 14 MG/DL (ref 5–40)

## 2024-11-29 LAB
ALBUMIN/CREAT UR: <3 MG/G CREAT (ref 0–29)
CREAT UR-MCNC: 92.7 MG/DL
MICROALBUMIN UR-MCNC: <3 UG/ML

## 2024-12-02 ENCOUNTER — OFFICE VISIT (OUTPATIENT)
Age: 49
End: 2024-12-02

## 2024-12-02 VITALS
RESPIRATION RATE: 16 BRPM | BODY MASS INDEX: 42.66 KG/M2 | DIASTOLIC BLOOD PRESSURE: 85 MMHG | HEIGHT: 72 IN | HEART RATE: 94 BPM | SYSTOLIC BLOOD PRESSURE: 133 MMHG | WEIGHT: 315 LBS | TEMPERATURE: 98 F | OXYGEN SATURATION: 94 %

## 2024-12-02 DIAGNOSIS — Z79.4 TYPE 2 DIABETES MELLITUS WITH HYPERGLYCEMIA, WITH LONG-TERM CURRENT USE OF INSULIN (HCC): ICD-10-CM

## 2024-12-02 DIAGNOSIS — E11.65 TYPE 2 DIABETES MELLITUS WITH HYPERGLYCEMIA, WITH LONG-TERM CURRENT USE OF INSULIN (HCC): ICD-10-CM

## 2024-12-02 DIAGNOSIS — G47.33 OSA (OBSTRUCTIVE SLEEP APNEA): ICD-10-CM

## 2024-12-02 DIAGNOSIS — E78.2 MIXED HYPERLIPIDEMIA: ICD-10-CM

## 2024-12-02 DIAGNOSIS — I10 PRIMARY HYPERTENSION: ICD-10-CM

## 2024-12-02 DIAGNOSIS — E66.01 MORBID OBESITY: Primary | ICD-10-CM

## 2024-12-02 ASSESSMENT — PATIENT HEALTH QUESTIONNAIRE - PHQ9
2. FEELING DOWN, DEPRESSED OR HOPELESS: NOT AT ALL
SUM OF ALL RESPONSES TO PHQ QUESTIONS 1-9: 0
1. LITTLE INTEREST OR PLEASURE IN DOING THINGS: NOT AT ALL
SUM OF ALL RESPONSES TO PHQ QUESTIONS 1-9: 0
SUM OF ALL RESPONSES TO PHQ QUESTIONS 1-9: 0
SUM OF ALL RESPONSES TO PHQ9 QUESTIONS 1 & 2: 0
SUM OF ALL RESPONSES TO PHQ QUESTIONS 1-9: 0

## 2024-12-02 NOTE — PROGRESS NOTES
Identified patient with two patient identifiers (name and ). Reviewed chart in preparation for visit and have obtained necessary documentation.    Queenie Hurley is a 49 y.o. male  Chief Complaint   Patient presents with    Weight Management     Scheduled for lap gastric bypass on 24 with Dr Bowen Gonsalez     /85 (Site: Right Lower Arm, Position: Sitting, Cuff Size: Large Adult)   Pulse 94   Temp 98 °F (36.7 °C)   Resp 16   Ht 1.829 m (6')   Wt (!) 164.2 kg (362 lb)   SpO2 94%   BMI 49.10 kg/m²     1. Have you been to the ER, urgent care clinic since your last visit?  Hospitalized since your last visit?no    2. Have you seen or consulted any other health care providers outside of the [facility] since your last visit?  Include any pap smears or colon screening. Spine specialist    Patient and provider made aware of elevated BP x2. Patient asymptomatic. Patient reminded to monitor BP, continue to take BP medications if prescribed, and follow up with PCP/Cardiologist.  Patient expressed understanding and agreement.     
glucose 5/20/24  Yes Shemar Lackey APRN - NP   pregabalin (LYRICA) 75 MG capsule Take 2 capsules by mouth daily. 11/3/23  Yes Provider, MD [place]   traMADol (ULTRAM) 50 MG tablet Take 2 tablets by mouth 3 times daily. 10/7/17  Yes Provider, [place], MD   diclofenac sodium (VOLTAREN) 1 % GEL Apply 2 g topically 4 times daily as needed 1/20/20  Yes Automatic Reconciliation, Ar   fexofenadine (ALLEGRA) 180 MG tablet Take 1 tablet by mouth daily   Yes Automatic Reconciliation, Ar   albuterol sulfate HFA (PROVENTIL;VENTOLIN;PROAIR) 108 (90 Base) MCG/ACT inhaler Inhale 2 puffs into the lungs every 4 hours as needed for Shortness of Breath or Wheezing  Patient not taking: Reported on 12/2/2024 5/11/22   Automatic Reconciliation, Ar     Allergies   Allergen Reactions    Latex Hives    Guanfacine Other (See Comments)     Blood came out of nose and very dry mouth     Metformin Diarrhea    Pantoprazole Swelling     FEET    Trazodone Other (See Comments)     PAINFUL ERECTION    Clindamycin Phos-Benzoyl Perox Rash        Objective:     /85 (Site: Right Lower Arm, Position: Sitting, Cuff Size: Large Adult)   Pulse 94   Temp 98 °F (36.7 °C)   Resp 16   Ht 1.829 m (6')   Wt (!) 164.2 kg (362 lb)   SpO2 94%   BMI 49.10 kg/m²     Physical Exam:  GENERAL: morbidly obese.    Assessment:     Morbid obesity with multiple comorbidities; no improvement with medical management.    Plan:     Robotic assisted laparoscopic gastric bypass with upper endoscopy.  Technical aspects of procedure reviewed along with risks (to include but not limited to bleeding, wound infection, VTE, leak, open procedure, ulcer, stricture, nutritional deficiency, poor weight loss/weight regain).  Also reviewed anticipated hospital course, postoperative diet, and activity restrictions.  He understands and desires to proceed.  All questions answered.      Signed By: Bowen Gonsalez MD     December 2, 2024

## 2024-12-12 ENCOUNTER — ANESTHESIA (OUTPATIENT)
Facility: HOSPITAL | Age: 49
DRG: 403 | End: 2024-12-12
Payer: MEDICAID

## 2024-12-12 ENCOUNTER — ANESTHESIA EVENT (OUTPATIENT)
Facility: HOSPITAL | Age: 49
DRG: 403 | End: 2024-12-12
Payer: MEDICAID

## 2024-12-12 ENCOUNTER — HOSPITAL ENCOUNTER (INPATIENT)
Facility: HOSPITAL | Age: 49
LOS: 1 days | Discharge: HOME | DRG: 403 | End: 2024-12-13
Attending: SURGERY | Admitting: SURGERY
Payer: MEDICAID

## 2024-12-12 DIAGNOSIS — E66.01 MORBID OBESITY: Primary | ICD-10-CM

## 2024-12-12 DIAGNOSIS — G89.18 POSTOPERATIVE PAIN: ICD-10-CM

## 2024-12-12 LAB
GLUCOSE BLD STRIP.AUTO-MCNC: 159 MG/DL (ref 65–117)
GLUCOSE BLD STRIP.AUTO-MCNC: 179 MG/DL (ref 65–117)
GLUCOSE BLD STRIP.AUTO-MCNC: 216 MG/DL (ref 65–117)
GLUCOSE BLD STRIP.AUTO-MCNC: 242 MG/DL (ref 65–117)
SERVICE CMNT-IMP: ABNORMAL

## 2024-12-12 PROCEDURE — 1100000000 HC RM PRIVATE

## 2024-12-12 PROCEDURE — 6360000002 HC RX W HCPCS

## 2024-12-12 PROCEDURE — 6370000000 HC RX 637 (ALT 250 FOR IP): Performed by: SURGERY

## 2024-12-12 PROCEDURE — 2500000003 HC RX 250 WO HCPCS

## 2024-12-12 PROCEDURE — 0DJ08ZZ INSPECTION OF UPPER INTESTINAL TRACT, VIA NATURAL OR ARTIFICIAL OPENING ENDOSCOPIC: ICD-10-PCS | Performed by: SURGERY

## 2024-12-12 PROCEDURE — 6360000002 HC RX W HCPCS: Performed by: SURGERY

## 2024-12-12 PROCEDURE — 2709999900 HC NON-CHARGEABLE SUPPLY: Performed by: SURGERY

## 2024-12-12 PROCEDURE — 6360000002 HC RX W HCPCS: Performed by: ANESTHESIOLOGY

## 2024-12-12 PROCEDURE — 7100000001 HC PACU RECOVERY - ADDTL 15 MIN: Performed by: SURGERY

## 2024-12-12 PROCEDURE — 2580000003 HC RX 258: Performed by: ANESTHESIOLOGY

## 2024-12-12 PROCEDURE — 3700000000 HC ANESTHESIA ATTENDED CARE: Performed by: SURGERY

## 2024-12-12 PROCEDURE — 8E0W4CZ ROBOTIC ASSISTED PROCEDURE OF TRUNK REGION, PERCUTANEOUS ENDOSCOPIC APPROACH: ICD-10-PCS | Performed by: SURGERY

## 2024-12-12 PROCEDURE — 2580000003 HC RX 258: Performed by: SURGERY

## 2024-12-12 PROCEDURE — 0D164ZA BYPASS STOMACH TO JEJUNUM, PERCUTANEOUS ENDOSCOPIC APPROACH: ICD-10-PCS | Performed by: SURGERY

## 2024-12-12 PROCEDURE — S2900 ROBOTIC SURGICAL SYSTEM: HCPCS | Performed by: SURGERY

## 2024-12-12 PROCEDURE — 3600000009 HC SURGERY ROBOT BASE: Performed by: SURGERY

## 2024-12-12 PROCEDURE — 2720000010 HC SURG SUPPLY STERILE: Performed by: SURGERY

## 2024-12-12 PROCEDURE — 7100000000 HC PACU RECOVERY - FIRST 15 MIN: Performed by: SURGERY

## 2024-12-12 PROCEDURE — 3700000001 HC ADD 15 MINUTES (ANESTHESIA): Performed by: SURGERY

## 2024-12-12 PROCEDURE — C1781 MESH (IMPLANTABLE): HCPCS | Performed by: SURGERY

## 2024-12-12 PROCEDURE — 82962 GLUCOSE BLOOD TEST: CPT

## 2024-12-12 PROCEDURE — 3600000019 HC SURGERY ROBOT ADDTL 15MIN: Performed by: SURGERY

## 2024-12-12 PROCEDURE — 2500000003 HC RX 250 WO HCPCS: Performed by: SURGERY

## 2024-12-12 PROCEDURE — 2580000003 HC RX 258

## 2024-12-12 RX ORDER — NALOXONE HYDROCHLORIDE 0.4 MG/ML
INJECTION, SOLUTION INTRAMUSCULAR; INTRAVENOUS; SUBCUTANEOUS PRN
Status: DISCONTINUED | OUTPATIENT
Start: 2024-12-12 | End: 2024-12-12 | Stop reason: HOSPADM

## 2024-12-12 RX ORDER — BUPIVACAINE HYDROCHLORIDE 5 MG/ML
INJECTION, SOLUTION EPIDURAL; INTRACAUDAL PRN
Status: DISCONTINUED | OUTPATIENT
Start: 2024-12-12 | End: 2024-12-12 | Stop reason: HOSPADM

## 2024-12-12 RX ORDER — INSULIN LISPRO 100 [IU]/ML
0-16 INJECTION, SOLUTION INTRAVENOUS; SUBCUTANEOUS
Status: DISCONTINUED | OUTPATIENT
Start: 2024-12-12 | End: 2024-12-13 | Stop reason: HOSPADM

## 2024-12-12 RX ORDER — LORAZEPAM 2 MG/ML
1 CONCENTRATE ORAL EVERY 8 HOURS PRN
Status: DISCONTINUED | OUTPATIENT
Start: 2024-12-12 | End: 2024-12-13 | Stop reason: HOSPADM

## 2024-12-12 RX ORDER — HYDROMORPHONE HYDROCHLORIDE 1 MG/ML
1 INJECTION, SOLUTION INTRAMUSCULAR; INTRAVENOUS; SUBCUTANEOUS
Status: DISCONTINUED | OUTPATIENT
Start: 2024-12-12 | End: 2024-12-13 | Stop reason: HOSPADM

## 2024-12-12 RX ORDER — FENTANYL CITRATE 50 UG/ML
INJECTION, SOLUTION INTRAMUSCULAR; INTRAVENOUS
Status: DISCONTINUED | OUTPATIENT
Start: 2024-12-12 | End: 2024-12-12 | Stop reason: SDUPTHER

## 2024-12-12 RX ORDER — ONDANSETRON 4 MG/1
4 TABLET, ORALLY DISINTEGRATING ORAL EVERY 8 HOURS PRN
Status: DISCONTINUED | OUTPATIENT
Start: 2024-12-12 | End: 2024-12-13 | Stop reason: HOSPADM

## 2024-12-12 RX ORDER — HYDROMORPHONE HYDROCHLORIDE 1 MG/ML
0.5 INJECTION, SOLUTION INTRAMUSCULAR; INTRAVENOUS; SUBCUTANEOUS ONCE
Status: COMPLETED | OUTPATIENT
Start: 2024-12-12 | End: 2024-12-12

## 2024-12-12 RX ORDER — ACETAMINOPHEN 160 MG/5ML
650 LIQUID ORAL
Status: COMPLETED | OUTPATIENT
Start: 2024-12-12 | End: 2024-12-12

## 2024-12-12 RX ORDER — SCOLOPAMINE TRANSDERMAL SYSTEM 1 MG/1
1 PATCH, EXTENDED RELEASE TRANSDERMAL
Status: DISCONTINUED | OUTPATIENT
Start: 2024-12-12 | End: 2024-12-13 | Stop reason: HOSPADM

## 2024-12-12 RX ORDER — ALBUTEROL SULFATE 0.83 MG/ML
2.5 SOLUTION RESPIRATORY (INHALATION) EVERY 6 HOURS PRN
Status: DISCONTINUED | OUTPATIENT
Start: 2024-12-12 | End: 2024-12-13 | Stop reason: HOSPADM

## 2024-12-12 RX ORDER — IPRATROPIUM BROMIDE AND ALBUTEROL SULFATE 2.5; .5 MG/3ML; MG/3ML
1 SOLUTION RESPIRATORY (INHALATION)
Status: DISCONTINUED | OUTPATIENT
Start: 2024-12-12 | End: 2024-12-12 | Stop reason: HOSPADM

## 2024-12-12 RX ORDER — SODIUM CHLORIDE 0.9 % (FLUSH) 0.9 %
5-40 SYRINGE (ML) INJECTION EVERY 12 HOURS SCHEDULED
Status: DISCONTINUED | OUTPATIENT
Start: 2024-12-12 | End: 2024-12-12 | Stop reason: HOSPADM

## 2024-12-12 RX ORDER — CYCLOBENZAPRINE HCL 10 MG
10 TABLET ORAL 3 TIMES DAILY PRN
Status: DISCONTINUED | OUTPATIENT
Start: 2024-12-12 | End: 2024-12-13 | Stop reason: HOSPADM

## 2024-12-12 RX ORDER — ONDANSETRON 2 MG/ML
4 INJECTION INTRAMUSCULAR; INTRAVENOUS
Status: DISCONTINUED | OUTPATIENT
Start: 2024-12-12 | End: 2024-12-12 | Stop reason: HOSPADM

## 2024-12-12 RX ORDER — METRONIDAZOLE 500 MG/100ML
500 INJECTION, SOLUTION INTRAVENOUS
Status: COMPLETED | OUTPATIENT
Start: 2024-12-12 | End: 2024-12-12

## 2024-12-12 RX ORDER — PROPOFOL 10 MG/ML
INJECTION, EMULSION INTRAVENOUS
Status: DISCONTINUED | OUTPATIENT
Start: 2024-12-12 | End: 2024-12-12 | Stop reason: SDUPTHER

## 2024-12-12 RX ORDER — EPHEDRINE SULFATE 50 MG/ML
INJECTION INTRAVENOUS
Status: DISCONTINUED | OUTPATIENT
Start: 2024-12-12 | End: 2024-12-12 | Stop reason: SDUPTHER

## 2024-12-12 RX ORDER — ENOXAPARIN SODIUM 100 MG/ML
40 INJECTION SUBCUTANEOUS DAILY
Status: DISCONTINUED | OUTPATIENT
Start: 2024-12-13 | End: 2024-12-13 | Stop reason: HOSPADM

## 2024-12-12 RX ORDER — SODIUM CHLORIDE 9 MG/ML
INJECTION, SOLUTION INTRAVENOUS PRN
Status: DISCONTINUED | OUTPATIENT
Start: 2024-12-12 | End: 2024-12-13 | Stop reason: HOSPADM

## 2024-12-12 RX ORDER — SODIUM CHLORIDE 9 MG/ML
INJECTION, SOLUTION INTRAVENOUS PRN
Status: DISCONTINUED | OUTPATIENT
Start: 2024-12-12 | End: 2024-12-12 | Stop reason: HOSPADM

## 2024-12-12 RX ORDER — LORAZEPAM 2 MG/ML
0.5 INJECTION INTRAMUSCULAR
Status: DISCONTINUED | OUTPATIENT
Start: 2024-12-12 | End: 2024-12-12 | Stop reason: HOSPADM

## 2024-12-12 RX ORDER — METOPROLOL TARTRATE 1 MG/ML
5 INJECTION, SOLUTION INTRAVENOUS EVERY 6 HOURS
Status: DISCONTINUED | OUTPATIENT
Start: 2024-12-12 | End: 2024-12-13

## 2024-12-12 RX ORDER — PROCHLORPERAZINE EDISYLATE 5 MG/ML
5 INJECTION INTRAMUSCULAR; INTRAVENOUS
Status: COMPLETED | OUTPATIENT
Start: 2024-12-12 | End: 2024-12-12

## 2024-12-12 RX ORDER — ROCURONIUM BROMIDE 10 MG/ML
INJECTION, SOLUTION INTRAVENOUS
Status: DISCONTINUED | OUTPATIENT
Start: 2024-12-12 | End: 2024-12-12 | Stop reason: SDUPTHER

## 2024-12-12 RX ORDER — GABAPENTIN 250 MG/5ML
125 SOLUTION ORAL
Status: DISCONTINUED | OUTPATIENT
Start: 2024-12-12 | End: 2024-12-12 | Stop reason: HOSPADM

## 2024-12-12 RX ORDER — ONDANSETRON 2 MG/ML
INJECTION INTRAMUSCULAR; INTRAVENOUS
Status: DISCONTINUED | OUTPATIENT
Start: 2024-12-12 | End: 2024-12-12 | Stop reason: SDUPTHER

## 2024-12-12 RX ORDER — SODIUM CHLORIDE AND POTASSIUM CHLORIDE 150; 900 MG/100ML; MG/100ML
INJECTION, SOLUTION INTRAVENOUS CONTINUOUS
Status: DISCONTINUED | OUTPATIENT
Start: 2024-12-12 | End: 2024-12-13 | Stop reason: HOSPADM

## 2024-12-12 RX ORDER — DEXMEDETOMIDINE HYDROCHLORIDE 100 UG/ML
INJECTION, SOLUTION INTRAVENOUS
Status: DISCONTINUED | OUTPATIENT
Start: 2024-12-12 | End: 2024-12-12 | Stop reason: SDUPTHER

## 2024-12-12 RX ORDER — SODIUM CHLORIDE 0.9 % (FLUSH) 0.9 %
5-40 SYRINGE (ML) INJECTION PRN
Status: DISCONTINUED | OUTPATIENT
Start: 2024-12-12 | End: 2024-12-12 | Stop reason: HOSPADM

## 2024-12-12 RX ORDER — HYDRALAZINE HYDROCHLORIDE 20 MG/ML
10 INJECTION INTRAMUSCULAR; INTRAVENOUS
Status: DISCONTINUED | OUTPATIENT
Start: 2024-12-12 | End: 2024-12-12 | Stop reason: HOSPADM

## 2024-12-12 RX ORDER — SODIUM CHLORIDE, SODIUM LACTATE, POTASSIUM CHLORIDE, CALCIUM CHLORIDE 600; 310; 30; 20 MG/100ML; MG/100ML; MG/100ML; MG/100ML
INJECTION, SOLUTION INTRAVENOUS CONTINUOUS
Status: DISCONTINUED | OUTPATIENT
Start: 2024-12-12 | End: 2024-12-12 | Stop reason: HOSPADM

## 2024-12-12 RX ORDER — FENTANYL CITRATE 50 UG/ML
25 INJECTION, SOLUTION INTRAMUSCULAR; INTRAVENOUS EVERY 5 MIN PRN
Status: COMPLETED | OUTPATIENT
Start: 2024-12-12 | End: 2024-12-12

## 2024-12-12 RX ORDER — SUCCINYLCHOLINE/SOD CL,ISO/PF 200MG/10ML
SYRINGE (ML) INTRAVENOUS
Status: DISCONTINUED | OUTPATIENT
Start: 2024-12-12 | End: 2024-12-12 | Stop reason: SDUPTHER

## 2024-12-12 RX ORDER — DEXAMETHASONE SODIUM PHOSPHATE 4 MG/ML
INJECTION, SOLUTION INTRA-ARTICULAR; INTRALESIONAL; INTRAMUSCULAR; INTRAVENOUS; SOFT TISSUE
Status: DISCONTINUED | OUTPATIENT
Start: 2024-12-12 | End: 2024-12-12 | Stop reason: SDUPTHER

## 2024-12-12 RX ORDER — MIDAZOLAM HYDROCHLORIDE 1 MG/ML
INJECTION, SOLUTION INTRAMUSCULAR; INTRAVENOUS
Status: DISCONTINUED | OUTPATIENT
Start: 2024-12-12 | End: 2024-12-12 | Stop reason: SDUPTHER

## 2024-12-12 RX ORDER — HYDROMORPHONE HYDROCHLORIDE 1 MG/ML
0.5 INJECTION, SOLUTION INTRAMUSCULAR; INTRAVENOUS; SUBCUTANEOUS EVERY 5 MIN PRN
Status: COMPLETED | OUTPATIENT
Start: 2024-12-12 | End: 2024-12-12

## 2024-12-12 RX ORDER — DIPHENHYDRAMINE HYDROCHLORIDE 50 MG/ML
12.5 INJECTION INTRAMUSCULAR; INTRAVENOUS
Status: DISCONTINUED | OUTPATIENT
Start: 2024-12-12 | End: 2024-12-12 | Stop reason: HOSPADM

## 2024-12-12 RX ORDER — SODIUM CHLORIDE 0.9 % (FLUSH) 0.9 %
5-40 SYRINGE (ML) INJECTION PRN
Status: DISCONTINUED | OUTPATIENT
Start: 2024-12-12 | End: 2024-12-13 | Stop reason: HOSPADM

## 2024-12-12 RX ORDER — HYDRALAZINE HYDROCHLORIDE 20 MG/ML
20 INJECTION INTRAMUSCULAR; INTRAVENOUS EVERY 6 HOURS PRN
Status: DISCONTINUED | OUTPATIENT
Start: 2024-12-12 | End: 2024-12-13 | Stop reason: HOSPADM

## 2024-12-12 RX ORDER — MIDAZOLAM HYDROCHLORIDE 2 MG/2ML
2 INJECTION, SOLUTION INTRAMUSCULAR; INTRAVENOUS AS NEEDED
Status: DISCONTINUED | OUTPATIENT
Start: 2024-12-12 | End: 2024-12-12 | Stop reason: HOSPADM

## 2024-12-12 RX ORDER — FENTANYL CITRATE 50 UG/ML
100 INJECTION, SOLUTION INTRAMUSCULAR; INTRAVENOUS
Status: DISCONTINUED | OUTPATIENT
Start: 2024-12-12 | End: 2024-12-12 | Stop reason: HOSPADM

## 2024-12-12 RX ORDER — ALBUTEROL SULFATE 90 UG/1
2 INHALANT RESPIRATORY (INHALATION) EVERY 4 HOURS PRN
Status: DISCONTINUED | OUTPATIENT
Start: 2024-12-12 | End: 2024-12-12

## 2024-12-12 RX ORDER — GABAPENTIN 250 MG/5ML
125 SOLUTION ORAL EVERY 8 HOURS SCHEDULED
Status: DISCONTINUED | OUTPATIENT
Start: 2024-12-12 | End: 2024-12-13 | Stop reason: HOSPADM

## 2024-12-12 RX ORDER — ACETAMINOPHEN 500 MG
1000 TABLET ORAL ONCE
Status: DISCONTINUED | OUTPATIENT
Start: 2024-12-12 | End: 2024-12-12 | Stop reason: HOSPADM

## 2024-12-12 RX ORDER — ONDANSETRON 2 MG/ML
4 INJECTION INTRAMUSCULAR; INTRAVENOUS ONCE
Status: DISCONTINUED | OUTPATIENT
Start: 2024-12-12 | End: 2024-12-12 | Stop reason: HOSPADM

## 2024-12-12 RX ORDER — ACETAMINOPHEN 160 MG/5ML
650 LIQUID ORAL EVERY 6 HOURS SCHEDULED
Status: DISCONTINUED | OUTPATIENT
Start: 2024-12-12 | End: 2024-12-13 | Stop reason: HOSPADM

## 2024-12-12 RX ORDER — LIDOCAINE HYDROCHLORIDE ANHYDROUS AND DEXTROSE MONOHYDRATE 5; 400 G/100ML; MG/100ML
INJECTION, SOLUTION INTRAVENOUS
Status: DISCONTINUED | OUTPATIENT
Start: 2024-12-12 | End: 2024-12-12 | Stop reason: SDUPTHER

## 2024-12-12 RX ORDER — SODIUM CHLORIDE 0.9 % (FLUSH) 0.9 %
5-40 SYRINGE (ML) INJECTION EVERY 12 HOURS SCHEDULED
Status: DISCONTINUED | OUTPATIENT
Start: 2024-12-12 | End: 2024-12-13 | Stop reason: HOSPADM

## 2024-12-12 RX ORDER — LIDOCAINE HYDROCHLORIDE 10 MG/ML
1 INJECTION, SOLUTION EPIDURAL; INFILTRATION; INTRACAUDAL; PERINEURAL
Status: DISCONTINUED | OUTPATIENT
Start: 2024-12-12 | End: 2024-12-12 | Stop reason: HOSPADM

## 2024-12-12 RX ORDER — LIDOCAINE HYDROCHLORIDE 20 MG/ML
INJECTION, SOLUTION EPIDURAL; INFILTRATION; INTRACAUDAL; PERINEURAL
Status: DISCONTINUED | OUTPATIENT
Start: 2024-12-12 | End: 2024-12-12 | Stop reason: SDUPTHER

## 2024-12-12 RX ORDER — ONDANSETRON 2 MG/ML
4 INJECTION INTRAMUSCULAR; INTRAVENOUS EVERY 6 HOURS PRN
Status: DISCONTINUED | OUTPATIENT
Start: 2024-12-12 | End: 2024-12-13 | Stop reason: HOSPADM

## 2024-12-12 RX ORDER — OXYCODONE HYDROCHLORIDE 5 MG/1
5 TABLET ORAL
Status: DISCONTINUED | OUTPATIENT
Start: 2024-12-12 | End: 2024-12-12 | Stop reason: HOSPADM

## 2024-12-12 RX ADMIN — HYDROMORPHONE HYDROCHLORIDE 0.5 MG: 1 INJECTION, SOLUTION INTRAMUSCULAR; INTRAVENOUS; SUBCUTANEOUS at 10:32

## 2024-12-12 RX ADMIN — METOPROLOL TARTRATE 5 MG: 1 INJECTION, SOLUTION INTRAVENOUS at 23:55

## 2024-12-12 RX ADMIN — ONDANSETRON 4 MG: 2 INJECTION INTRAMUSCULAR; INTRAVENOUS at 17:14

## 2024-12-12 RX ADMIN — PROCHLORPERAZINE EDISYLATE 5 MG: 5 INJECTION INTRAMUSCULAR; INTRAVENOUS at 11:08

## 2024-12-12 RX ADMIN — GABAPENTIN 125 MG: 250 SOLUTION ORAL at 22:52

## 2024-12-12 RX ADMIN — FENTANYL CITRATE 25 MCG: 50 INJECTION INTRAMUSCULAR; INTRAVENOUS at 11:14

## 2024-12-12 RX ADMIN — FENTANYL CITRATE 25 MCG: 50 INJECTION INTRAMUSCULAR; INTRAVENOUS at 11:09

## 2024-12-12 RX ADMIN — HYOSCYAMINE SULFATE 0.12 MG: 0.12 TABLET SUBLINGUAL at 13:52

## 2024-12-12 RX ADMIN — SODIUM CHLORIDE: 9 INJECTION, SOLUTION INTRAVENOUS at 09:24

## 2024-12-12 RX ADMIN — Medication 200 MG: at 07:37

## 2024-12-12 RX ADMIN — HYDROMORPHONE HYDROCHLORIDE 1 MG: 1 INJECTION, SOLUTION INTRAMUSCULAR; INTRAVENOUS; SUBCUTANEOUS at 17:14

## 2024-12-12 RX ADMIN — INSULIN LISPRO 4 UNITS: 100 INJECTION, SOLUTION INTRAVENOUS; SUBCUTANEOUS at 11:38

## 2024-12-12 RX ADMIN — SODIUM CHLORIDE, POTASSIUM CHLORIDE, SODIUM LACTATE AND CALCIUM CHLORIDE: 600; 310; 30; 20 INJECTION, SOLUTION INTRAVENOUS at 06:41

## 2024-12-12 RX ADMIN — GABAPENTIN 125 MG: 250 SOLUTION ORAL at 15:59

## 2024-12-12 RX ADMIN — HYDRALAZINE HYDROCHLORIDE 20 MG: 20 INJECTION INTRAMUSCULAR; INTRAVENOUS at 14:48

## 2024-12-12 RX ADMIN — LIDOCAINE HYDROCHLORIDE 100 MG: 20 INJECTION, SOLUTION EPIDURAL; INFILTRATION; INTRACAUDAL; PERINEURAL at 07:37

## 2024-12-12 RX ADMIN — MIDAZOLAM HYDROCHLORIDE 2 MG: 1 INJECTION, SOLUTION INTRAMUSCULAR; INTRAVENOUS at 07:31

## 2024-12-12 RX ADMIN — ROCURONIUM BROMIDE 20 MG: 10 INJECTION, SOLUTION INTRAVENOUS at 07:57

## 2024-12-12 RX ADMIN — PHENYLEPHRINE HYDROCHLORIDE 120 MCG: 10 INJECTION INTRAVENOUS at 07:40

## 2024-12-12 RX ADMIN — PROPOFOL 30 MG: 10 INJECTION, EMULSION INTRAVENOUS at 09:55

## 2024-12-12 RX ADMIN — ACETAMINOPHEN 650 MG: 650 SOLUTION ORAL at 06:28

## 2024-12-12 RX ADMIN — ROCURONIUM BROMIDE 40 MG: 10 INJECTION, SOLUTION INTRAVENOUS at 07:41

## 2024-12-12 RX ADMIN — FENTANYL CITRATE 25 MCG: 50 INJECTION INTRAMUSCULAR; INTRAVENOUS at 11:55

## 2024-12-12 RX ADMIN — MIDAZOLAM HYDROCHLORIDE 3 MG: 1 INJECTION, SOLUTION INTRAMUSCULAR; INTRAVENOUS at 07:29

## 2024-12-12 RX ADMIN — DEXAMETHASONE SODIUM PHOSPHATE 4 MG: 4 INJECTION INTRA-ARTICULAR; INTRALESIONAL; INTRAMUSCULAR; INTRAVENOUS; SOFT TISSUE at 07:37

## 2024-12-12 RX ADMIN — HYDROMORPHONE HYDROCHLORIDE 0.5 MG: 1 INJECTION, SOLUTION INTRAMUSCULAR; INTRAVENOUS; SUBCUTANEOUS at 14:33

## 2024-12-12 RX ADMIN — EPHEDRINE SULFATE 10 MG: 50 INJECTION INTRAVENOUS at 08:28

## 2024-12-12 RX ADMIN — DEXMEDETOMIDINE 4 MCG: 100 INJECTION, SOLUTION, CONCENTRATE INTRAVENOUS at 09:44

## 2024-12-12 RX ADMIN — PHENYLEPHRINE HYDROCHLORIDE 120 MCG: 10 INJECTION INTRAVENOUS at 07:55

## 2024-12-12 RX ADMIN — SODIUM CHLORIDE, PRESERVATIVE FREE 10 ML: 5 INJECTION INTRAVENOUS at 20:24

## 2024-12-12 RX ADMIN — ROCURONIUM BROMIDE 10 MG: 10 INJECTION, SOLUTION INTRAVENOUS at 07:37

## 2024-12-12 RX ADMIN — ONDANSETRON 4 MG: 2 INJECTION INTRAMUSCULAR; INTRAVENOUS at 10:00

## 2024-12-12 RX ADMIN — PHENYLEPHRINE HYDROCHLORIDE 80 MCG: 10 INJECTION INTRAVENOUS at 08:22

## 2024-12-12 RX ADMIN — INSULIN LISPRO 4 UNITS: 100 INJECTION, SOLUTION INTRAVENOUS; SUBCUTANEOUS at 17:40

## 2024-12-12 RX ADMIN — FENTANYL CITRATE 25 MCG: 50 INJECTION INTRAMUSCULAR; INTRAVENOUS at 11:48

## 2024-12-12 RX ADMIN — HYDROMORPHONE HYDROCHLORIDE 1 MG: 1 INJECTION, SOLUTION INTRAMUSCULAR; INTRAVENOUS; SUBCUTANEOUS at 20:22

## 2024-12-12 RX ADMIN — METOPROLOL TARTRATE 5 MG: 1 INJECTION, SOLUTION INTRAVENOUS at 17:41

## 2024-12-12 RX ADMIN — FENTANYL CITRATE 100 MCG: 50 INJECTION, SOLUTION INTRAMUSCULAR; INTRAVENOUS at 07:37

## 2024-12-12 RX ADMIN — PHENYLEPHRINE HYDROCHLORIDE 120 MCG: 10 INJECTION INTRAVENOUS at 08:00

## 2024-12-12 RX ADMIN — SUGAMMADEX 400 MG: 100 INJECTION, SOLUTION INTRAVENOUS at 10:22

## 2024-12-12 RX ADMIN — ACETAMINOPHEN 650 MG: 650 SOLUTION ORAL at 17:41

## 2024-12-12 RX ADMIN — METRONIDAZOLE 500 MG: 5 INJECTION, SOLUTION INTRAVENOUS at 07:52

## 2024-12-12 RX ADMIN — EPHEDRINE SULFATE 10 MG: 50 INJECTION INTRAVENOUS at 08:00

## 2024-12-12 RX ADMIN — HYDROMORPHONE HYDROCHLORIDE 0.5 MG: 1 INJECTION, SOLUTION INTRAMUSCULAR; INTRAVENOUS; SUBCUTANEOUS at 10:18

## 2024-12-12 RX ADMIN — SODIUM CHLORIDE 3000 MG: 900 INJECTION INTRAVENOUS at 07:53

## 2024-12-12 RX ADMIN — CYCLOBENZAPRINE 10 MG: 10 TABLET, FILM COATED ORAL at 21:44

## 2024-12-12 RX ADMIN — HYDROMORPHONE HYDROCHLORIDE 0.5 MG: 1 INJECTION, SOLUTION INTRAMUSCULAR; INTRAVENOUS; SUBCUTANEOUS at 11:11

## 2024-12-12 RX ADMIN — PROPOFOL 200 MG: 10 INJECTION, EMULSION INTRAVENOUS at 07:37

## 2024-12-12 RX ADMIN — LIDOCAINE HYDROCHLORIDE 1 MG/KG/HR: 4 INJECTION, SOLUTION INTRAVENOUS at 08:00

## 2024-12-12 RX ADMIN — HYDROMORPHONE HYDROCHLORIDE 0.5 MG: 1 INJECTION, SOLUTION INTRAMUSCULAR; INTRAVENOUS; SUBCUTANEOUS at 15:58

## 2024-12-12 RX ADMIN — ROCURONIUM BROMIDE 10 MG: 10 INJECTION, SOLUTION INTRAVENOUS at 09:17

## 2024-12-12 RX ADMIN — POTASSIUM CHLORIDE AND SODIUM CHLORIDE: 900; 150 INJECTION, SOLUTION INTRAVENOUS at 16:18

## 2024-12-12 RX ADMIN — HYDROMORPHONE HYDROCHLORIDE 0.5 MG: 1 INJECTION, SOLUTION INTRAMUSCULAR; INTRAVENOUS; SUBCUTANEOUS at 11:17

## 2024-12-12 ASSESSMENT — PAIN DESCRIPTION - LOCATION
LOCATION: BACK
LOCATION: ABDOMEN;SHOULDER;BACK
LOCATION: BACK
LOCATION: BACK
LOCATION: BACK;ABDOMEN
LOCATION: BACK

## 2024-12-12 ASSESSMENT — PAIN SCALES - GENERAL
PAINLEVEL_OUTOF10: 6
PAINLEVEL_OUTOF10: 5
PAINLEVEL_OUTOF10: 6
PAINLEVEL_OUTOF10: 10
PAINLEVEL_OUTOF10: 10
PAINLEVEL_OUTOF10: 6
PAINLEVEL_OUTOF10: 9
PAINLEVEL_OUTOF10: 6

## 2024-12-12 ASSESSMENT — PAIN DESCRIPTION - DESCRIPTORS
DESCRIPTORS: ACHING
DESCRIPTORS: ACHING
DESCRIPTORS: ACHING;BURNING
DESCRIPTORS: ACHING;BURNING
DESCRIPTORS: ACHING

## 2024-12-12 ASSESSMENT — PAIN DESCRIPTION - ORIENTATION
ORIENTATION: LOWER
ORIENTATION: ANTERIOR;RIGHT
ORIENTATION: LOWER;RIGHT;MID

## 2024-12-12 ASSESSMENT — PAIN - FUNCTIONAL ASSESSMENT: PAIN_FUNCTIONAL_ASSESSMENT: 0-10
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
## 2024-12-12 NOTE — ANESTHESIA POSTPROCEDURE EVALUATION
Department of Anesthesiology  Postprocedure Note    Patient: Queenie Hurley  MRN: [id number]  YOB: 1975  Date of evaluation: 12/12/2024    Procedure Summary       Date: 12/12/24 Room / Location: [facility] MAIN OR 09 / [facility] MAIN OR    Anesthesia Start: 0729 Anesthesia Stop: 1040    Procedure: ROBOTIC ASSISTED LAPAROSCOPIC GASTRIC BYPASS, ESOPHAGOGASTRODUODENOSCOPY (ERAS) (Abdomen) Diagnosis:       Morbid obesity      Inadequately controlled diabetes mellitus (HCC)      Encounter for long-term (current) use of insulin (HCC)      Acute back pain with sciatica, right      Chronic idiopathic anal pain      Hyperlipemia      (Morbid obesity [E66.01])      (Inadequately controlled diabetes mellitus (HCC) [E11.65])      (Encounter for long-term (current) use of insulin (HCC) [Z79.4])      (Acute back pain with sciatica, right [M54.41])      (Chronic idiopathic anal pain [K62.89, G89.29])      (Hyperlipemia [E78.5])    Providers: Bowen Gonsalez MD Responsible Provider: Lyssa Nicolas DO    Anesthesia Type: General ASA Status: 3            Anesthesia Type: General    Reji Phase I: Reji Score: 8    Reji Phase II:      Anesthesia Post Evaluation    Patient location during evaluation: PACU  Level of consciousness: awake  Airway patency: patent  Nausea & Vomiting: no nausea  Cardiovascular status: hemodynamically stable  Respiratory status: acceptable  Hydration status: stable  Multimodal analgesia pain management approach  Pain management: adequate    No notable events documented.

## 2024-12-12 NOTE — BRIEF OP NOTE
Brief Postoperative Note      Patient: Queenie Hurley  YOB: 1975  MRN: [id number]    Date of Procedure: 12/12/2024    Pre-Op Diagnosis Codes:      * Morbid obesity [E66.01]     * Inadequately controlled diabetes mellitus (HCC) [E11.65]     * Encounter for long-term (current) use of insulin (HCC) [Z79.4]     * Acute back pain with sciatica, right [M54.41]     * Chronic idiopathic anal pain [K62.89, G89.29]     * Hyperlipemia [E78.5]    Post-Op Diagnosis: Same       Procedure(s):  ROBOTIC ASSISTED LAPAROSCOPIC GASTRIC BYPASS, ESOPHAGOGASTRODUODENOSCOPY (ERAS)    Surgeon(s):  Bowen Gonsalez MD    Assistant:  Surgical Assistant: Radhames Glynn    Anesthesia: General    Estimated Blood Loss (mL): Minimal    Complications: None    Specimens:   * No specimens in log *    Implants:  Implant Name Type Inv. Item Serial No.  Lot No. LRB No. Used Action   60mm [place] [employer] Staple Line Reinforcement   NA [employer] END[employer]UR[employer]_CR [id number] N/A 2 Implanted   60mm [place] [employer] Staple Line Reinforcement    [employer] END[employer]-SURG[employer]_CR [id number] N/A 3 Implanted         Drains: * No LDAs found *    Findings:  Infection Present At Time Of Surgery (PATOS) (choose all levels that have infection present):  No infection present  Other Findings: Creation of a 30 cc gastric pouch with 150 cm antecolic, antigastric Nola limb.  Handsewn gastrojejunostomy.  No intraluminal hemorrhage or insufflation air leak on upper endoscopy.    Electronically signed by Bowen Gonsalez MD on 12/12/2024 at 10:20 AM

## 2024-12-12 NOTE — PERIOP NOTE
TRANSFER - OUT REPORT:    Verbal report given to ADÁN Arias on Queenie Hurley  being transferred to [facility] for routine post-op       Report consisted of patient's Situation, Background, Assessment and   Recommendations(SBAR).     Information from the following report(s) Nurse Handoff Report, Adult Overview, Surgery Report, Intake/Output, MAR, and Recent Results was reviewed with the receiving nurse.           Lines:   Peripheral IV 12/12/24 Left;Upper Arm (Active)   Site Assessment Clean, dry & intact 12/12/24 1035   Line Status Capped 12/12/24 1035   Line Care Connections checked and tightened 12/12/24 1035   Phlebitis Assessment No symptoms 12/12/24 1035   Infiltration Assessment 0 12/12/24 1035   Alcohol Cap Used Yes 12/12/24 1035   Dressing Status Clean, dry & intact 12/12/24 1035   Dressing Type Transparent 12/12/24 1035       Peripheral IV 12/12/24 Right Hand (Active)   Site Assessment Clean, dry & intact 12/12/24 1035   Line Status Infusing 12/12/24 1035   Line Care Connections checked and tightened 12/12/24 1035   Phlebitis Assessment No symptoms 12/12/24 1035   Infiltration Assessment 0 12/12/24 1035   Alcohol Cap Used Yes 12/12/24 1035   Dressing Status Clean, dry & intact 12/12/24 1035   Dressing Type Transparent 12/12/24 1035        Opportunity for questions and clarification was provided.

## 2024-12-12 NOTE — ANESTHESIA PRE PROCEDURE
Department of Anesthesiology  Preprocedure Note       Name:  Queenie Hurley   Age:  49 y.o.  :  1975                                          MRN:  [id number]         Date:  2024      Surgeon: Surgeon(s):  Bowen Gonsalez MD    Procedure: Procedure(s):  ROBOTIC ASSISTED LAPAROSCOPIC GASTRIC BYPASS, ESOPHAGOGASTRODUODENOSCOPY (ERAS)    Medications prior to admission:   Prior to Admission medications    Medication Sig Start Date End Date Taking? Authorizing Provider   lisinopril (PRINIVIL;ZESTRIL) 40 MG tablet Take 1 tablet by mouth daily 24  Yes Shemar Lackey APRN - NP   spironolactone (ALDACTONE) 25 MG tablet Take 1 tablet by mouth daily 24  Yes Shemar Lackey APRN - NP   topiramate (TOPAMAX) 50 MG tablet Take 1 tablet by mouth 2 times daily 24  Yes Shemar Lackey APRN - NP   dilTIAZem (CARTIA XT) 240 MG extended release capsule Take 1 capsule by mouth daily 24  Yes Shemar Lackey APRN - NP   atenolol (TENORMIN) 50 MG tablet Take 1 tablet by mouth daily 24  Yes Shemar Lackey APRN - NP   insulin lispro, 1 Unit Dial, (HUMALOG/ADMELOG) 100 UNIT/ML SOPN INJECT 20 UNITS SUBCUTANEOUSLY BEFORE BREAKFAST, LUNCH, AND DINNER 24  Yes Shemar Lackey APRN - NP   empagliflozin (JARDIANCE) 25 MG tablet Take 1 tablet by mouth daily 24  Yes Shemar Lackey APRN - NP   DULoxetine (CYMBALTA) 60 MG extended release capsule Take 1 capsule by mouth daily 24  Yes Shemar Lackey APRN - NP   nystatin (MYCOSTATIN) 434646 UNIT/GM ointment APPLY  OINTMENT TOPICALLY TO AFFECTED AREA TWICE DAILY 24  Yes Shemar Lackey APRN - NP   nystatin (MYCOSTATIN) 720460 UNIT/GM powder APPLY  POWDER TOPICALLY TO AFFECTED AREA THREE TIMES DAILY 24  Yes Shemar Lackey APRN - NP   hydroCHLOROthiazide (HYDRODIURIL) 25 MG tablet TAKE 1 TABLET BY MOUTH ONCE DAILY 24  Yes Jordanian, Katri L, APRN - NP   vitamin D (ERGOCALCIFEROL) 1.25 MG (65419 UT) CAPS capsule Take 1

## 2024-12-12 NOTE — OP NOTE
each segment of bowel, and through these openings, a 60 mm white load linear stapler was carefully inserted.  After confirming correct insertion and orientation of the stapler, the stapler was closed,   fired, and removed.  The staple line was noted to be hemostatic.  The remaining opening was closed with a running 3-0 PDS suture.  A hitch suture between the antimesenteric border of the Nola limb and the excluded stomach was placed.  The defect at the jejunojejunostomy was closed with a running 2-0 permanent barbed suture.  Carroll's space was closed using identical technique.  Remaining needles and suture strands were then removed.  The Nola limb was clamped distal to the gastrojejunostomy.  Sterile saline was instilled into the upper abdomen.  Intraoperative upper endoscopy was performed.  The gastroscope was passed transorally, through the gastroesophageal junction, and into the gastric pouch.  With insufflation using the gastroscope, adequate pouch and Nola limb distention were noted.  No intraluminal hemorrhage was identified.  No insufflation or leak occurred.  The anastomosis was hemostatic and patent.  The bowel was decompressed and the gastroscope was removed.  Sterile saline was evacuated from the upper abdomen.  After confirming satisfactory hemostasis and absence of visceral injury, remaining instruments were removed, and the robotic patient cart was undocked.  A 19 mm Shayne drain was inserted into the abdominal space.  It was allowed to lie adjacent to the gastrojejunostomy and brought out through the left 8 mm robotic trocar site.  It was secured to the skin with a 2-0 nylon suture.  The Mike liver retractor was removed, followed by closure of the 12 mm fascial defect using a 0 Vicryl suture with a laparoscopic suture passer.  Pneumoperitoneum was released, and all trocars were removed.  All wounds were infiltrated with 0.5% Marcaine without epinephrine.  All skin edges were reapproximated with a

## 2024-12-13 VITALS
SYSTOLIC BLOOD PRESSURE: 148 MMHG | DIASTOLIC BLOOD PRESSURE: 83 MMHG | WEIGHT: 315 LBS | BODY MASS INDEX: 48.47 KG/M2 | OXYGEN SATURATION: 100 % | RESPIRATION RATE: 18 BRPM | HEART RATE: 75 BPM | TEMPERATURE: 97.9 F

## 2024-12-13 LAB
ANION GAP SERPL CALC-SCNC: 4 MMOL/L (ref 2–12)
BASOPHILS # BLD: 0 K/UL (ref 0–0.1)
BASOPHILS NFR BLD: 0 % (ref 0–1)
BUN SERPL-MCNC: 11 MG/DL (ref 6–20)
BUN/CREAT SERPL: 11 (ref 12–20)
CALCIUM SERPL-MCNC: 9.2 MG/DL (ref 8.5–10.1)
CHLORIDE SERPL-SCNC: 104 MMOL/L (ref 97–108)
CO2 SERPL-SCNC: 28 MMOL/L (ref 21–32)
CREAT SERPL-MCNC: 0.98 MG/DL (ref 0.7–1.3)
DIFFERENTIAL METHOD BLD: ABNORMAL
EOSINOPHIL # BLD: 0 K/UL (ref 0–0.4)
EOSINOPHIL NFR BLD: 0 % (ref 0–7)
ERYTHROCYTE [DISTWIDTH] IN BLOOD BY AUTOMATED COUNT: 15.3 % (ref 11.5–14.5)
GLUCOSE BLD STRIP.AUTO-MCNC: 154 MG/DL (ref 65–117)
GLUCOSE BLD STRIP.AUTO-MCNC: 169 MG/DL (ref 65–117)
GLUCOSE BLD STRIP.AUTO-MCNC: 176 MG/DL (ref 65–117)
GLUCOSE SERPL-MCNC: 168 MG/DL (ref 65–100)
HCT VFR BLD AUTO: 44.2 % (ref 36.6–50.3)
HGB BLD-MCNC: 14.3 G/DL (ref 12.1–17)
IMM GRANULOCYTES # BLD AUTO: 0.1 K/UL (ref 0–0.04)
IMM GRANULOCYTES NFR BLD AUTO: 0 % (ref 0–0.5)
LYMPHOCYTES # BLD: 5 K/UL (ref 0.8–3.5)
LYMPHOCYTES NFR BLD: 31 % (ref 12–49)
MCH RBC QN AUTO: 27.1 PG (ref 26–34)
MCHC RBC AUTO-ENTMCNC: 32.4 G/DL (ref 30–36.5)
MCV RBC AUTO: 83.9 FL (ref 80–99)
MONOCYTES # BLD: 1.2 K/UL (ref 0–1)
MONOCYTES NFR BLD: 8 % (ref 5–13)
NEUTS SEG # BLD: 9.8 K/UL (ref 1.8–8)
NEUTS SEG NFR BLD: 61 % (ref 32–75)
NRBC # BLD: 0 K/UL (ref 0–0.01)
NRBC BLD-RTO: 0 PER 100 WBC
PLATELET # BLD AUTO: 231 K/UL (ref 150–400)
PMV BLD AUTO: 10.7 FL (ref 8.9–12.9)
POTASSIUM SERPL-SCNC: 3.6 MMOL/L (ref 3.5–5.1)
RBC # BLD AUTO: 5.27 M/UL (ref 4.1–5.7)
SERVICE CMNT-IMP: ABNORMAL
SODIUM SERPL-SCNC: 136 MMOL/L (ref 136–145)
WBC # BLD AUTO: 16.1 K/UL (ref 4.1–11.1)

## 2024-12-13 PROCEDURE — 6370000000 HC RX 637 (ALT 250 FOR IP): Performed by: SURGERY

## 2024-12-13 PROCEDURE — 85025 COMPLETE CBC W/AUTO DIFF WBC: CPT

## 2024-12-13 PROCEDURE — 97161 PT EVAL LOW COMPLEX 20 MIN: CPT

## 2024-12-13 PROCEDURE — 2580000003 HC RX 258: Performed by: SURGERY

## 2024-12-13 PROCEDURE — 6360000002 HC RX W HCPCS: Performed by: SURGERY

## 2024-12-13 PROCEDURE — 80048 BASIC METABOLIC PNL TOTAL CA: CPT

## 2024-12-13 PROCEDURE — 82962 GLUCOSE BLOOD TEST: CPT

## 2024-12-13 PROCEDURE — 97116 GAIT TRAINING THERAPY: CPT

## 2024-12-13 RX ORDER — DILTIAZEM HYDROCHLORIDE 240 MG/1
240 CAPSULE, COATED, EXTENDED RELEASE ORAL DAILY
Status: DISCONTINUED | OUTPATIENT
Start: 2024-12-13 | End: 2024-12-13 | Stop reason: HOSPADM

## 2024-12-13 RX ORDER — HYDROMORPHONE HYDROCHLORIDE 2 MG/1
2 TABLET ORAL EVERY 4 HOURS PRN
Qty: 25 TABLET | Refills: 0 | Status: SHIPPED | OUTPATIENT
Start: 2024-12-13 | End: 2024-12-20

## 2024-12-13 RX ORDER — ROSUVASTATIN CALCIUM 10 MG/1
10 TABLET, COATED ORAL DAILY
Status: DISCONTINUED | OUTPATIENT
Start: 2024-12-13 | End: 2024-12-13 | Stop reason: HOSPADM

## 2024-12-13 RX ORDER — KETOROLAC TROMETHAMINE 30 MG/ML
15 INJECTION, SOLUTION INTRAMUSCULAR; INTRAVENOUS EVERY 6 HOURS
Status: DISCONTINUED | OUTPATIENT
Start: 2024-12-13 | End: 2024-12-13 | Stop reason: HOSPADM

## 2024-12-13 RX ORDER — DULOXETIN HYDROCHLORIDE 30 MG/1
60 CAPSULE, DELAYED RELEASE ORAL DAILY
Status: DISCONTINUED | OUTPATIENT
Start: 2024-12-13 | End: 2024-12-13 | Stop reason: HOSPADM

## 2024-12-13 RX ORDER — ATENOLOL 50 MG/1
50 TABLET ORAL DAILY
Status: DISCONTINUED | OUTPATIENT
Start: 2024-12-13 | End: 2024-12-13 | Stop reason: HOSPADM

## 2024-12-13 RX ORDER — TOPIRAMATE 25 MG/1
50 TABLET, FILM COATED ORAL 2 TIMES DAILY
Status: DISCONTINUED | OUTPATIENT
Start: 2024-12-13 | End: 2024-12-13 | Stop reason: HOSPADM

## 2024-12-13 RX ORDER — HYDROMORPHONE HYDROCHLORIDE 2 MG/1
2 TABLET ORAL EVERY 4 HOURS PRN
Status: DISCONTINUED | OUTPATIENT
Start: 2024-12-13 | End: 2024-12-13 | Stop reason: HOSPADM

## 2024-12-13 RX ADMIN — GABAPENTIN 125 MG: 250 SOLUTION ORAL at 07:12

## 2024-12-13 RX ADMIN — DULOXETINE HYDROCHLORIDE 60 MG: 30 CAPSULE, DELAYED RELEASE ORAL at 08:52

## 2024-12-13 RX ADMIN — ENOXAPARIN SODIUM 40 MG: 100 INJECTION SUBCUTANEOUS at 08:56

## 2024-12-13 RX ADMIN — ATENOLOL 50 MG: 50 TABLET ORAL at 08:53

## 2024-12-13 RX ADMIN — GABAPENTIN 125 MG: 250 SOLUTION ORAL at 15:05

## 2024-12-13 RX ADMIN — TOPIRAMATE 50 MG: 25 TABLET, FILM COATED ORAL at 08:47

## 2024-12-13 RX ADMIN — SODIUM CHLORIDE, PRESERVATIVE FREE 10 ML: 5 INJECTION INTRAVENOUS at 13:00

## 2024-12-13 RX ADMIN — HYDROMORPHONE HYDROCHLORIDE 1 MG: 1 INJECTION, SOLUTION INTRAMUSCULAR; INTRAVENOUS; SUBCUTANEOUS at 00:25

## 2024-12-13 RX ADMIN — HYDROMORPHONE HYDROCHLORIDE 1 MG: 1 INJECTION, SOLUTION INTRAMUSCULAR; INTRAVENOUS; SUBCUTANEOUS at 12:20

## 2024-12-13 RX ADMIN — POTASSIUM CHLORIDE AND SODIUM CHLORIDE: 900; 150 INJECTION, SOLUTION INTRAVENOUS at 13:35

## 2024-12-13 RX ADMIN — HYDROMORPHONE HYDROCHLORIDE 1 MG: 1 INJECTION, SOLUTION INTRAMUSCULAR; INTRAVENOUS; SUBCUTANEOUS at 04:06

## 2024-12-13 RX ADMIN — ACETAMINOPHEN 650 MG: 650 SOLUTION ORAL at 13:36

## 2024-12-13 RX ADMIN — KETOROLAC TROMETHAMINE 15 MG: 30 INJECTION, SOLUTION INTRAMUSCULAR at 08:37

## 2024-12-13 RX ADMIN — POTASSIUM CHLORIDE AND SODIUM CHLORIDE: 900; 150 INJECTION, SOLUTION INTRAVENOUS at 01:52

## 2024-12-13 RX ADMIN — HYDROMORPHONE HYDROCHLORIDE 2 MG: 2 TABLET ORAL at 08:44

## 2024-12-13 RX ADMIN — ROSUVASTATIN 10 MG: 10 TABLET, FILM COATED ORAL at 08:54

## 2024-12-13 RX ADMIN — Medication 1 MG: at 01:26

## 2024-12-13 RX ADMIN — ACETAMINOPHEN 650 MG: 650 SOLUTION ORAL at 07:12

## 2024-12-13 RX ADMIN — DILTIAZEM HYDROCHLORIDE 240 MG: 240 CAPSULE, COATED, EXTENDED RELEASE ORAL at 08:51

## 2024-12-13 RX ADMIN — KETOROLAC TROMETHAMINE 15 MG: 30 INJECTION, SOLUTION INTRAMUSCULAR at 15:03

## 2024-12-13 ASSESSMENT — PAIN SCALES - GENERAL
PAINLEVEL_OUTOF10: 3
PAINLEVEL_OUTOF10: 8
PAINLEVEL_OUTOF10: 9
PAINLEVEL_OUTOF10: 5
PAINLEVEL_OUTOF10: 9
PAINLEVEL_OUTOF10: 10
PAINLEVEL_OUTOF10: 9
PAINLEVEL_OUTOF10: 9

## 2024-12-13 ASSESSMENT — PAIN DESCRIPTION - DESCRIPTORS
DESCRIPTORS: ACHING;BURNING
DESCRIPTORS: ACHING
DESCRIPTORS: SHARP

## 2024-12-13 ASSESSMENT — PAIN DESCRIPTION - ORIENTATION
ORIENTATION: MID
ORIENTATION: POSTERIOR
ORIENTATION: MID;RIGHT

## 2024-12-13 ASSESSMENT — PAIN DESCRIPTION - LOCATION
LOCATION: ABDOMEN
LOCATION: ABDOMEN;BACK
LOCATION: ABDOMEN
LOCATION: ABDOMEN;BACK
LOCATION: ABDOMEN;BACK

## 2024-12-13 NOTE — DISCHARGE INSTRUCTIONS
Left message for patient to call back    Patient is due or an appointment with Dr Bergman.  Please schedule   [facility] [place]  Bariatric Surgery Discharge Instructions     Procedure Laparoscopic gastric bypass     Future Appointments   Date Time Provider Department Center   12/27/2024  8:40 AM Monica Rivera, JAZMIN - NP [facility]   1/14/2025  8:00 AM Zara Donis APRN - NP [facility]   1/28/2025  8:00 AM Zara Donis APRN - NP [facility]   2/17/2025  4:20 PM Jarocho Groves MD [facility]   3/31/2025  1:00 PM Sehmar Lackey, [facility]AZMIN [facility]         Contact Information:    [facility] at [address], [address]   [place] [address]  [phone]    After Hours and Weekends  [phone] On Call Surgeon    Non Emergent Medical Needs  Call during office hours or send a message via My Chart   (messages returned during business hours)    DIET    Please remember that you are on ONLY LIQUIDS for the first 2 weeks after surgery.     Do not advance to the next phase until advised by your surgeon or Nurse Practitioner.   Refer to the Bariatric Handbook for detailed information.     TO PREVENT DEHYDRATION:  consume 48-64 ounces of liquids daily.  At least 48 ounces of that should come from water, Crystal Light, sugar free popsicles, sugar free gelatin or other calorie-free, sugar-free, caffeine free and noncarbonated beverages. Do not drink with a straw.   Sip, sip, sip throughout the day  Main priority is to stay hydrated  Aim for 60 grams of protein every day.  Most of your protein will come from shakes.  Refer to the Bariatric Handbook for detailed information.  Add additional protein supplements to meet protein needs (protein powder, clear protein such as protein water, non-fat dry milk powder, NO protein bars at this at this stage)     MEDICATIONS & VITAMINS    Pre-surgery medications should be reviewed with your Bariatric provider and taken as prescribed   Take no more than 2 pills at a time and

## 2024-12-13 NOTE — PROGRESS NOTES
Pt states he was seen by PT and did well. Reports he will no be needing any PT HH or rehab for discharge. States he was able to ambulate and climb stairs without any issues.  Tolerating rounds of fluid with minimal nausea; no vomiting and states he feels ready to discharge home.   D/W Dr. Gonsalez
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
## 2024-12-13 NOTE — DISCHARGE SUMMARY
Walking as tolerated.  No driving while you are taking narcotics.  Diet: Full bariatric liquid diet.  If you have cramps or nausea, try eating smaller light meals more frequently.  You may find it helpful to take an over-the-counter stool softener such as colace if you feel constipated.    Wound Care: Keep clean and dry.  You may shower, but no immersion in standing water (bath tubs, swimming pools) x 2 weeks. Pat area with soft towel to dry.     Follow-up with provider in 2 weeks as scheduled. Call office at [phone] to schedule appointment.  We are located at [facility] in the [facility], [address].       Signed:  JAZMIN Clarke NP  12/13/2024  3:35 PM

## 2024-12-13 NOTE — CONSULTS
Nutrition Education    Educated on Bariatric post-op diet  Learners: Patient  Readiness: Eager  Method: Explanation  Response: Verbalizes Understanding  Contact name and number provided.    Jennifer Paige RD  Contact via [employer]

## 2024-12-13 NOTE — CARE COORDINATION
Care Management Initial Assessment       RUR: 12% Low   Readmission? No  1st IM letter given? NA  1st  letter given: NA    CM met with patient at bedside to introduce self and explain role. Patient lives alone in a 2 story apartment with 16 steps to enter. Patient is independent at baseline with ADL's, IADL's and ambulation. Patient uses a C-pap at night. No history of HH or SNF/IPR. Patient's father will provide transport home once medically stable. CM will follow as needed.      12/13/24 1243   Service Assessment   Patient Orientation Alert and Oriented;Person;Place;Situation;Self   Cognition Alert   History Provided By Patient   Primary Caregiver Self   Accompanied By/Relationship FAther   Support Systems Parent;Family Members   Patient's Healthcare Decision Maker is: Legal Next of Kin   PCP Verified by CM Yes  (JAZMIN Morgan)   Last Visit to PCP Within last 3 months   Prior Functional Level Independent in ADLs/IADLs   Current Functional Level Independent in ADLs/IADLs   Can patient return to prior living arrangement Yes   Ability to make needs known: Good   Family able to assist with home care needs: Yes   Would you like for me to discuss the discharge plan with any other family members/significant others, and if so, who? Yes   Financial Resources Medicaid   Social/Functional History   Lives With Alone   Type of Home Apartment   Home Layout One level   Bathroom Equipment None   Home Equipment Cane   Prior Level of Assist for ADLs Independent   Prior Level of Assist for Homemaking Independent   Ambulation Assistance Independent   Prior Level of Assist for Transfers Independent   Discharge Planning   Type of Residence Apartment   Living Arrangements Alone   Current Services Prior To Admission C-pap   Potential Assistance Needed N/A   DME Ordered? No   Potential Assistance Purchasing Medications No   Type of Home Care Services None   Patient expects to be discharged to: [facility]     AGNES Sloan

## 2024-12-13 NOTE — PROGRESS NOTES
Department of General Surgery - Adult  Surgical Service   Attending Progress Note      SUBJECTIVE: Patient complains of back pain; he has started bariatric liquids and has ambulated in the halls.  He is performing spirometry.  Incisional pain, nausea controlled.    OBJECTIVE      Physical    VITALS:  BP (!) 143/82   Pulse 81   Temp 98.8 °F (37.1 °C) (Oral)   Resp 17   Wt (!) 162.1 kg (357 lb 5.9 oz)   SpO2 93%   BMI 48.47 kg/m²   INTAKE/OUTPUT:    Intake/Output Summary (Last 24 hours) at 12/13/2024 0806  Last data filed at 12/13/2024 0659  Gross per 24 hour   Intake 1710 ml   Output 1960 ml   Net -250 ml     LUNGS:  diminished breath sounds right base and left base  CARDIOVASCULAR:  regular rate and rhythm, normal S1 and S2, no S3 or S4, and no murmur noted  ABDOMEN: Obese, nondistended, soft.  Laparoscopic wounds dry and intact.  Serosanguineous drain fluid.  Appropriate incisional pain with palpation.    Data  CBC with Differential:    Lab Results   Component Value Date/Time    WBC 16.1 12/13/2024 04:21 AM    RBC 5.27 12/13/2024 04:21 AM    HGB 14.3 12/13/2024 04:21 AM    HCT 44.2 12/13/2024 04:21 AM     12/13/2024 04:21 AM    MCV 83.9 12/13/2024 04:21 AM    MCH 27.1 12/13/2024 04:21 AM    MCHC 32.4 12/13/2024 04:21 AM    RDW 15.3 12/13/2024 04:21 AM    NRBC 0.0 12/13/2024 04:21 AM    NRBC 0.00 12/13/2024 04:21 AM    LYMPHOPCT 31 12/13/2024 04:21 AM    MONOPCT 8 12/13/2024 04:21 AM    EOSPCT 0 12/13/2024 04:21 AM    BASOPCT 0 12/13/2024 04:21 AM    MONOSABS 1.2 12/13/2024 04:21 AM    LYMPHSABS 5.0 12/13/2024 04:21 AM    EOSABS 0.0 12/13/2024 04:21 AM    BASOSABS 0.0 12/13/2024 04:21 AM    DIFFTYPE AUTOMATED 12/13/2024 04:21 AM     BMP:    Lab Results   Component Value Date/Time     12/13/2024 04:21 AM    K 3.6 12/13/2024 04:21 AM     12/13/2024 04:21 AM    CO2 28 12/13/2024 04:21 AM    BUN 11 12/13/2024 04:21 AM    LABALBU 9.6 11/20/2023 08:07 AM    CREATININE 0.98 12/13/2024 04:21 AM

## 2024-12-14 NOTE — PROGRESS NOTES
Discharger instructions reviewed. Understanding good. Tolerating po. Abulating in best.. NBAP discontinued.

## 2024-12-16 ENCOUNTER — TELEPHONE (OUTPATIENT)
Dept: PRIMARY CARE CLINIC | Facility: CLINIC | Age: 49
End: 2024-12-16

## 2024-12-16 ENCOUNTER — TELEPHONE (OUTPATIENT)
Age: 49
End: 2024-12-16

## 2024-12-16 NOTE — TELEPHONE ENCOUNTER
Care Transitions Initial Follow Up Call    Outreach made within 2 business days of discharge: Yes    Patient: Queenie Hurley Patient : 1975   MRN: [id number]  Reason for Admission: Morbid obesity  Discharge Date: 24       Spoke with: patient    Discharge department/facility: [place]        Scheduled appointment with PCP within 7-14 days    Follow Up  Future Appointments   Date Time Provider Department Center   2024  7:00 AM Shemar Lackey APRN - NP [facility] DEP   2024  8:40 AM Monica Rivera APRN - NP [facility] AMB   2025  8:00 AM Zara Donis APRN - NP [facility] AMB   2025  8:00 AM Zara Donis APRN - NP [facility] AMB   2025  4:20 PM [facility]   3/31/2025  1:00 PM Shemar Lackey APRN - NP [facility] DEP       Carlee Centeno

## 2024-12-16 NOTE — TELEPHONE ENCOUNTER
Care Transitions Initial Follow Up Call    Outreach made within 2 business days of discharge: Yes    Patient: Queenie Hurley Patient : 1975   MRN: [id number]  Reason for Admission: Gastric Bypass  Discharge Date: 24       Spoke with: Patient    Discharge department/facility: 2024    TCM Interactive Patient Contact:  Was patient able to fill all prescriptions: Yes  Was patient instructed to bring all medications to the follow-up visit: Yes  Is patient taking all medications as directed in the discharge summary? Yes  Does patient understand their discharge instructions: Yes  Does patient have questions or concerns that need addressed prior to 7-14 day follow up office visit: yes - Yes    Additional needs identified to be addressed with provider  No needs identified             Scheduled appointment with PCP within 7-14 days    Follow Up  Future Appointments   Date Time Provider Department Center   2024  7:00 AM Shemar Lackey APRN - NP [facility]   2024  8:40 AM Monica Rivera APRN - NP [facility] AMB   2025  8:00 AM Zara Donis APRN - NP [facility] AMB   2025  8:00 AM Zara Donis APRN - NP [facility] AMB   2025  4:20 PM Jarocho Groves MD [facility] AMB   3/31/2025  1:00 PM Shemar Lackey APRN - NP [facility] DEP       Maria Luisa Miranda CMA

## 2024-12-16 NOTE — TELEPHONE ENCOUNTER
Bariatric Post Op Call 48 hour    Hydration: Less than 32 ounces of water daily is fair to poor (Goal is 64 ounces per day)  Poor [] Fair [x]  Good [] Great []    Amount: 40 ounces    Comment: Patient could not report an amount for Saturday. 40 ounces reported yesterday. 40 ounces as of this time today.     Ambulation:( walking throughout the day, at least every 2 hours while awake. Patient should be up and out of bed most of the day.)   Poor [] Fair []  Good [] Great [x]    Comment:    Urine Color: Question of any odor and color (should be jennifer, pale, and clear)   Dark [] Jennifer [] Pale [] Clear []    Comment:No odor, reported urine as a chacon yellow, not dark.     Diet: Question any nausea and/or vomiting.            Protein intake (ultimately goal is 60 grams of protein daily, but at 2 days post op they should be working towards this and may not be at goal yet)  Poor [] Fair [x]  Good [] Great []    Comment: No nausea and or vomiting. Reported struggling with the shake on Saturday. On shake consumed on Sunday and one at this time today.      Bowel movements: Question of any constipation- haven't had any bowel movements for more than 3 days. This could be related to protein intake and/or narcotic pain medication usage.     Comment: Reported previously having a history of the issue with BM's. He has been adding two caps of Miralax to a 40 ounce container and sipping. He did have a BM yesterday and it was loose. Reported constantly going to the bathroom.          Use of incentive spirometer:  Yes [x]  No []    Comment: reported using    Incision: (No redness, pain, swelling or fever)     Healing Well [x] Redness [] Pain [] Drainage [] Swelling []    Comment: Reported a temperature of 99.6 Saturday and Sunday. No elevated temperature today. Reported some swelling and pain on the right side but today the swelling has decreased and the pain has gotten better.     Pain: Right sided incisional (usually the largest

## 2024-12-17 ENCOUNTER — PATIENT MESSAGE (OUTPATIENT)
Dept: PRIMARY CARE CLINIC | Facility: CLINIC | Age: 49
End: 2024-12-17

## 2024-12-17 DIAGNOSIS — B37.0 THRUSH: Primary | ICD-10-CM

## 2024-12-17 RX ORDER — NYSTATIN 100000 [USP'U]/ML
500000 SUSPENSION ORAL 4 TIMES DAILY
Qty: 200 ML | Refills: 0 | Status: SHIPPED | OUTPATIENT
Start: 2024-12-17 | End: 2024-12-27

## 2024-12-17 NOTE — TELEPHONE ENCOUNTER
I spoke with the patient. He reported getting in 40 ounces of clear liquids yesterday and 1 shake. Reported getting in at least 1 shake each day. He did purchase a different brand of shakes. He has noted that he is not having the issue (discomfort) today with consuming the shake that he had before.  Urine is a chacon yellow in color. The swelling on the right side continues to decrease. No fever in the past 24 hours. He did say, \"I felt warm and took my temp but it was 97 something. I then remembered I had also drank something.\" No BM yesterday. He decided to go cold turkey with the Miralax due to frequent loose stools. Last dose was taken yesterday and he had added two caps to 40 ounces of water. He reported he will start the Miralax again but 1/2 cap to start. Complained of experiencing dizzy spells, when he goes to stand up or even when he's just standing still. He said, \"It's like someone just pushed me and I'll go sit down.\" Pain on the right side is getting better. He did order an abdominal binder. He reported when he is bending over or goes to sit the pain is great. Once he sits down he's okay. I told him this is normal because he is using the abdominal muscles when changing positions. He reported not starting the calcium because the ones he ordered are large and he is concerned with swallowing them, so he ordered a liquid form.

## 2024-12-18 ENCOUNTER — TELEPHONE (OUTPATIENT)
Age: 49
End: 2024-12-18

## 2024-12-18 NOTE — TELEPHONE ENCOUNTER
I spoke with the patient. He informed me that he has not been taking any medications for his BP. He has also been monitoring his BP and it has been very good. He complained of the pain being bad last night. He informed me that he was doing household chores such as laundry and other things. The pain increased and he had to rest for about an hour. He reported taking Tramadol and flexeril. I asked him about drinking his shakes and any discomfort. He said, \"It's getting better but when it hits my stomach I am still feeling pain.\" I asked him if the drinks are at room temperature and he said, \"No!\" I told him his liquids should be at room temperature. He did say that the pain is 50% better than it has been. I told him I will route this to the NP and call you back tomorrow.

## 2024-12-19 ENCOUNTER — TELEMEDICINE (OUTPATIENT)
Dept: PRIMARY CARE CLINIC | Facility: CLINIC | Age: 49
End: 2024-12-19

## 2024-12-19 DIAGNOSIS — Z79.4 TYPE 2 DIABETES MELLITUS WITH HYPERGLYCEMIA, WITH LONG-TERM CURRENT USE OF INSULIN (HCC): ICD-10-CM

## 2024-12-19 DIAGNOSIS — Z09 HOSPITAL DISCHARGE FOLLOW-UP: Primary | ICD-10-CM

## 2024-12-19 DIAGNOSIS — E66.01 MORBID OBESITY: ICD-10-CM

## 2024-12-19 DIAGNOSIS — E11.65 TYPE 2 DIABETES MELLITUS WITH HYPERGLYCEMIA, WITH LONG-TERM CURRENT USE OF INSULIN (HCC): ICD-10-CM

## 2024-12-19 DIAGNOSIS — I10 ESSENTIAL HYPERTENSION: ICD-10-CM

## 2024-12-19 RX ORDER — TRAMADOL HYDROCHLORIDE 50 MG/1
50 TABLET ORAL EVERY 6 HOURS PRN
COMMUNITY

## 2024-12-19 RX ORDER — CYCLOBENZAPRINE HCL 10 MG
1 TABLET ORAL 3 TIMES DAILY PRN
COMMUNITY
End: 2024-12-19

## 2024-12-19 SDOH — ECONOMIC STABILITY: FOOD INSECURITY: WITHIN THE PAST 12 MONTHS, YOU WORRIED THAT YOUR FOOD WOULD RUN OUT BEFORE YOU GOT MONEY TO BUY MORE.: NEVER TRUE

## 2024-12-19 SDOH — ECONOMIC STABILITY: FOOD INSECURITY: WITHIN THE PAST 12 MONTHS, THE FOOD YOU BOUGHT JUST DIDN'T LAST AND YOU DIDN'T HAVE MONEY TO GET MORE.: NEVER TRUE

## 2024-12-19 SDOH — ECONOMIC STABILITY: INCOME INSECURITY: HOW HARD IS IT FOR YOU TO PAY FOR THE VERY BASICS LIKE FOOD, HOUSING, MEDICAL CARE, AND HEATING?: NOT HARD AT ALL

## 2024-12-19 ASSESSMENT — PATIENT HEALTH QUESTIONNAIRE - PHQ9
1. LITTLE INTEREST OR PLEASURE IN DOING THINGS: NOT AT ALL
SUM OF ALL RESPONSES TO PHQ QUESTIONS 1-9: 0
8. MOVING OR SPEAKING SO SLOWLY THAT OTHER PEOPLE COULD HAVE NOTICED. OR THE OPPOSITE, BEING SO FIGETY OR RESTLESS THAT YOU HAVE BEEN MOVING AROUND A LOT MORE THAN USUAL: NOT AT ALL
3. TROUBLE FALLING OR STAYING ASLEEP: NOT AT ALL
10. IF YOU CHECKED OFF ANY PROBLEMS, HOW DIFFICULT HAVE THESE PROBLEMS MADE IT FOR YOU TO DO YOUR WORK, TAKE CARE OF THINGS AT HOME, OR GET ALONG WITH OTHER PEOPLE: NOT DIFFICULT AT ALL
2. FEELING DOWN, DEPRESSED OR HOPELESS: NOT AT ALL
SUM OF ALL RESPONSES TO PHQ QUESTIONS 1-9: 0
SUM OF ALL RESPONSES TO PHQ QUESTIONS 1-9: 0
5. POOR APPETITE OR OVEREATING: NOT AT ALL
1. LITTLE INTEREST OR PLEASURE IN DOING THINGS: NOT AT ALL
SUM OF ALL RESPONSES TO PHQ QUESTIONS 1-9: 0
SUM OF ALL RESPONSES TO PHQ QUESTIONS 1-9: 0
9. THOUGHTS THAT YOU WOULD BE BETTER OFF DEAD, OR OF HURTING YOURSELF: NOT AT ALL
SUM OF ALL RESPONSES TO PHQ9 QUESTIONS 1 & 2: 0
2. FEELING DOWN, DEPRESSED OR HOPELESS: NOT AT ALL
6. FEELING BAD ABOUT YOURSELF - OR THAT YOU ARE A FAILURE OR HAVE LET YOURSELF OR YOUR FAMILY DOWN: NOT AT ALL
7. TROUBLE CONCENTRATING ON THINGS, SUCH AS READING THE NEWSPAPER OR WATCHING TELEVISION: NOT AT ALL
SUM OF ALL RESPONSES TO PHQ QUESTIONS 1-9: 0
SUM OF ALL RESPONSES TO PHQ QUESTIONS 1-9: 0
SUM OF ALL RESPONSES TO PHQ9 QUESTIONS 1 & 2: 0
4. FEELING TIRED OR HAVING LITTLE ENERGY: NOT AT ALL
SUM OF ALL RESPONSES TO PHQ QUESTIONS 1-9: 0

## 2024-12-19 NOTE — TELEPHONE ENCOUNTER
I spoke with the patient. I informed him I spoke with ALEXANDRIA Donis. She said, \"Continue to slow down drinking. Limit chores around the house and that is probably aggravating his stitch.\"  I reminded him that his liquids should be at room temperature, not ice cold. I asked him about the dizziness and he said, \"That's gone!\" I told him I will check back with you on Monday but if you have any concerns please call the office. He acknowledged understanding and thanked me for the call.

## 2024-12-19 NOTE — PROGRESS NOTES
Chief Complaint   Patient presents with    Follow-up         12/17/2024     4:37 PM   Patient-Reported Vitals   Patient-Reported Weight 354   Patient-Reported Height 6'   Patient-Reported Systolic 122 mmHg   Patient-Reported Diastolic 64 mmHg   Patient-Reported Pulse 85   Patient-Reported Temperature 97.9       \"Have you been to the ER, urgent care clinic since your last visit?  Hospitalized since your last visit?\"    NO    “Have you seen or consulted any other health care providers outside our system since your last visit?”    NO      “Have you had a colorectal cancer screening such as a colonoscopy/FIT/Cologuard?    NO    No colonoscopy on file  No cologuard on file  No FIT/FOBT on file   No flexible sigmoidoscopy on file     “Have you had a diabetic eye exam?”    NO     Date of last diabetic eye exam: 8/14/2017            
encounter (Telemedicine) with Shemar Lackey APRN - NP   Medication Sig Dispense Refill    traMADol (ULTRAM) 50 MG tablet Take 1 tablet by mouth every 6 hours as needed for Pain. Max Daily Amount: 200 mg      Multiple Vitamins-Minerals (OPTISOURCE POST BARIATRIC SURG PO) Take by mouth      nystatin (MYCOSTATIN) 614205 UNIT/ML suspension Take 5 mLs by mouth 4 times daily for 10 days Retain in mouth as long as possible 200 mL 0    insulin lispro, 1 Unit Dial, (HUMALOG/ADMELOG) 100 UNIT/ML SOPN INJECT 20 UNITS SUBCUTANEOUSLY BEFORE BREAKFAST, LUNCH, AND DINNER 30 mL 3    vitamin D (ERGOCALCIFEROL) 1.25 MG (41676 UT) CAPS capsule Take 1 capsule by mouth once a week 12 capsule 0    cyclobenzaprine (FLEXERIL) 10 MG tablet Take 1 tablet by mouth 3 times daily as needed for Muscle spasms      Insulin Pen Needle (BD PEN NEEDLE MICRO U/F) 32G X 6 MM MISC USE 1 NEW PEN NEEDLE TO INJECT INSULIN SUBCUTANEOULSY 4 TIMES DAILY 100 each 0    polyethylene glycol (GLYCOLAX) 17 GM/SCOOP powder TAKE 17 GRAMS BY MOUTH DAILY AS NEEDED FOR CONSTIPATION 1020 g 2    omeprazole (PRILOSEC) 20 MG delayed release capsule Take 1 capsule by mouth daily (Patient taking differently: Take 1 capsule by mouth as needed) 90 capsule 0    Continuous Glucose Sensor (FREESTYLE DEREK 3 SENSOR) MISC Use as directed to monitor blood glucose 6 each 3        Medications patient taking as of now reconciled against medications ordered at time of hospital discharge: Yes    A comprehensive review of systems was negative except for what was noted in the HPI.    Objective:    Patient-Reported Vitals  No data recorded    Physical Exam  Constitutional:       Appearance: Normal appearance. He is obese.   HENT:      Head: Normocephalic.   Eyes:      Conjunctiva/sclera: Conjunctivae normal.   Pulmonary:      Effort: Pulmonary effort is normal.   Skin:     Coloration: Skin is not pale.   Neurological:      Mental Status: He is alert and oriented to person, place, and

## 2024-12-27 ENCOUNTER — OFFICE VISIT (OUTPATIENT)
Age: 49
End: 2024-12-27

## 2024-12-27 VITALS
BODY MASS INDEX: 42.66 KG/M2 | HEIGHT: 72 IN | SYSTOLIC BLOOD PRESSURE: 134 MMHG | HEART RATE: 104 BPM | RESPIRATION RATE: 20 BRPM | DIASTOLIC BLOOD PRESSURE: 84 MMHG | OXYGEN SATURATION: 96 % | TEMPERATURE: 97.7 F | WEIGHT: 315 LBS

## 2024-12-27 DIAGNOSIS — E66.01 MORBID OBESITY WITH BMI OF 50.0-59.9, ADULT: ICD-10-CM

## 2024-12-27 DIAGNOSIS — K91.2 POSTOPERATIVE INTESTINAL MALABSORPTION: Primary | ICD-10-CM

## 2024-12-27 DIAGNOSIS — Z09 SURGICAL FOLLOW-UP CARE: ICD-10-CM

## 2024-12-27 PROCEDURE — 99024 POSTOP FOLLOW-UP VISIT: CPT | Performed by: NURSE PRACTITIONER

## 2024-12-27 ASSESSMENT — PATIENT HEALTH QUESTIONNAIRE - PHQ9
6. FEELING BAD ABOUT YOURSELF - OR THAT YOU ARE A FAILURE OR HAVE LET YOURSELF OR YOUR FAMILY DOWN: SEVERAL DAYS
8. MOVING OR SPEAKING SO SLOWLY THAT OTHER PEOPLE COULD HAVE NOTICED. OR THE OPPOSITE, BEING SO FIGETY OR RESTLESS THAT YOU HAVE BEEN MOVING AROUND A LOT MORE THAN USUAL: NOT AT ALL
10. IF YOU CHECKED OFF ANY PROBLEMS, HOW DIFFICULT HAVE THESE PROBLEMS MADE IT FOR YOU TO DO YOUR WORK, TAKE CARE OF THINGS AT HOME, OR GET ALONG WITH OTHER PEOPLE: SOMEWHAT DIFFICULT
SUM OF ALL RESPONSES TO PHQ QUESTIONS 1-9: 5
1. LITTLE INTEREST OR PLEASURE IN DOING THINGS: NOT AT ALL
SUM OF ALL RESPONSES TO PHQ QUESTIONS 1-9: 5
SUM OF ALL RESPONSES TO PHQ9 QUESTIONS 1 & 2: 0
4. FEELING TIRED OR HAVING LITTLE ENERGY: SEVERAL DAYS
7. TROUBLE CONCENTRATING ON THINGS, SUCH AS READING THE NEWSPAPER OR WATCHING TELEVISION: SEVERAL DAYS
SUM OF ALL RESPONSES TO PHQ QUESTIONS 1-9: 5
SUM OF ALL RESPONSES TO PHQ QUESTIONS 1-9: 5
3. TROUBLE FALLING OR STAYING ASLEEP: MORE THAN HALF THE DAYS
2. FEELING DOWN, DEPRESSED OR HOPELESS: NOT AT ALL
5. POOR APPETITE OR OVEREATING: NOT AT ALL
9. THOUGHTS THAT YOU WOULD BE BETTER OFF DEAD, OR OF HURTING YOURSELF: NOT AT ALL

## 2024-12-27 NOTE — PATIENT INSTRUCTIONS
Continue to use your protein powder and or shakes every day to meet the 60 grams / day protein goal (minimum)    Liquids are still a priority and aim for at least 64 oz water or other approved clear liquid every day       In addition to everything on the Bariatric Liquid diet, you may   add these foods to your diet.  Protein - include with every meal  Egg or egg substitute    Low fat or fat-free cottage cheese    Low fat or fat-free yogurt   Low fat Greek yogurt   Fat-free, 1% milk, or Lactaid milk   Low-fat or vegetarian refried beans   Well-cooked beans and lentils  Fat-free or 2% reduced-fat cheese   Hummus   Low fat soup     Snacks/Other Options:  Sugar free fudgsicles   Sugar free cocoa   No sugar added pudding     Fruits and Vegetables  Applesauce (no sugar added)  Canned fruit (no sugar added)  Fresh soft peeled fruits (melons, banana, avocado, berries)  Any soft cooked vegetables   Mashed potatoes, Sweet potatoes, baked potatoes (no skin)    Condiments  Fat free non-stick spray  Herbs and spices  Lite butter, margarine, canola oil, olive oil  Reduced-fat or fat-free stanley  Reduced-fat or fat-free salad dressing  Reduced-fat or fat-free cream cheese  Reduced-fat or fat-free sour cream  Lemon juice  Salt, pepper, mustard, ketchup, salsa    Prepare food to the appropriate texture.    Sample Meal Plan:  Soft and Mushy    Breakfast ½ cup plain oatmeal with protein powder. Add cinnamon, nutmeg, Splenda brown sugar as desired for flavor 20-25 grams protein   Snack (optional) High protein gelatin (recipe on www.unjury.com) 10 grams protein   Lunch ½ cup low fat cottage cheese or Greek yogurt with soft fruit 10 - 15 grams protein    Snack (optional) High protein pudding or high protein popsicle (recipe on www.[employer].com) 10 grams protein   Dinner ¼ cup low-fat well cooked beans with low-fat cheese sprinkled on top  ¼ cup no sugar added applesauce (can sprinkle protein powder) 5-8 grams protein  5-10  grams protein

## 2024-12-27 NOTE — PROGRESS NOTES
Identified patient with two patient identifiers (name and ). Reviewed chart in preparation for visit and have obtained necessary documentation.    Queenie Hurley is a 49 y.o. male  Chief Complaint   Patient presents with    Post-Op Check     / ERAS Procedure     /84 (Site: Right Upper Arm, Position: Sitting, Cuff Size: Large Adult)   Pulse (!) 104   Temp 97.7 °F (36.5 °C) (Oral)   Resp 20   Ht 1.829 m (6')   Wt (!) 169.8 kg (374 lb 6.4 oz)   SpO2 96%   BMI 50.78 kg/m²     1. Have you been to the ER, urgent care clinic since your last visit?  Hospitalized since your last visit?yes - ER visit reported.     2. Have you seen or consulted any other health care providers outside of the [facility] since your last visit?  Include any pap smears or colon screening. no

## 2024-12-27 NOTE — PROGRESS NOTES
Chief Complaint   Patient presents with    Post-Op Check     12/12/ ERAS Procedure       HPI: Queenie Hurley presents today for obesity management and surgical follow-up.  He is 2-week status post gastric bypass for treatment of morbid obesity.  Says he is doing okay.  He reports the first week was \"rough\" because of back and abdominal pain.  Prior to surgery had back pain and has been on disability.  He has been using Flexeril and some Tylenol.  This week has been better.  He said he can bend and reach with some discomfort but not bad.  His blood sugars have been less than 150 on no insulin.  Asked about his other medications \"I am not taking any of that stuff\".  He has not been taking his cholesterol or his blood pressure medications.    2 weeks status post: [x] Gastric bypass for treatment of morbid obesity   [] Sleeve gastrectomy for treatment of morbid obesity   [] Conversion sleeve to gastric bypass   [] Conversion lap band to gastric bypass   [] SAD-I  [] DS  [] Revision      Presents today for [x] Obesity management   [] Weight regain   [] Abdominal pain   [] Medication management       [x] No fever or chills, chest pain or shortness of breath.    Pain:  Yes   Yes, Location chronic low back pain and some pain right abdomen as expected     Taking any pain relieving medications Yes   Tylenol and Flexeril.  He has not resumed his Cymbalta      Yes Meeting protein goals (60 grams minimum per day)  [x] Shakes x 2 to 3/day [] Bars [] Other    Yes Meeting hydration goals (64 oz minimum daily)    [x] Water [] Juices [x] Sugar-free add-ins [] Electrolyte drink/mix    No Tracking intake        Bowels moving  [x] Most days   [] Few times per week   [x] Having a bowel movement every other day sometimes twice a day    Constipated   No    Using laxatives  Yes was using MiraLAX but he significantly improved    Nausea   No    Regurgitation   No      He has been taking omeprazole every other day because he says it gives him

## 2025-01-02 ENCOUNTER — TELEPHONE (OUTPATIENT)
Age: 50
End: 2025-01-02

## 2025-01-02 NOTE — TELEPHONE ENCOUNTER
Bariatric Post Op Call: Week 3     Hydration: Less than 32 ounces of water daily is fair to poor (Goal is 64 ounces per day)  Poor [] Fair []  Good [] Great [x]    Amount: 64 ounces daily    Comment:    Ambulation: walking at least 3x/ week for 30 minutes   Poor [] Fair []  Good [] Great [x]    Comment:    Urine Color: Question of any odor and color (should be jennifer, pale, and clear)   Dark [] Jennifer [] Pale [x] Clear []    Comment: No odor    Diet: Question any nausea and/or vomiting.   Protein intake (ultimately goal is 60 grams of protein daily and at this stage they should be meeting goal).  Poor [] Fair []  Good [] Great [x]    Comment: No nausea and or vomiting.       Bowel movements: Question of any constipation- haven't had any bowel movements for more than 3 days. This could be related to protein, fluid intake, medications and activity.     Comment: Reported two episodes of constipation recently. Reported trying to take Miralax every other day. Reported episode of diarrhea and started Miralax every other day but missed two days and had constipation. I recommended he do 1/2 cap daily.     Incision: (No redness, pain, swelling or fever)     Healing Well [x] Redness [] Pain [] Drainage [] Swelling []    Comment: No Fever    Pain: Right sided incisional (usually the largest incision with deep stitch) abdominal pain is normal (should be less than 3). This should be better by 3 weeks post op.   Pain 0 - 10: 0    Comment (are they taking pain medication and is it helping? Abdominal support / splinting/ ice or heat?):   Reported taking Tramadol as usual.    Referred to provider: NP   Next Appointment:  1/14/25   Support Group: 2nd Thursday every month from 6-7 pm on Zoom     Red Flags = prompt referral to a bariatric team provider for follow up    Fever > 101  Vomiting and not tolerating liquids   Weak and dizzy / lightheaded   Dark urine   Abdominal pain despite medication, splinting, ice or heat   SOB  Calf

## 2025-01-14 ENCOUNTER — TELEMEDICINE (OUTPATIENT)
Age: 50
End: 2025-01-14

## 2025-01-14 VITALS — BODY MASS INDEX: 42.66 KG/M2 | WEIGHT: 315 LBS | HEIGHT: 72 IN

## 2025-01-14 DIAGNOSIS — E66.01 MORBID OBESITY: Primary | ICD-10-CM

## 2025-01-14 DIAGNOSIS — Z09 SURGICAL FOLLOW-UP CARE: ICD-10-CM

## 2025-01-14 PROCEDURE — 99024 POSTOP FOLLOW-UP VISIT: CPT | Performed by: NURSE PRACTITIONER

## 2025-01-14 ASSESSMENT — PAIN DESCRIPTION - LOCATION: LOCATION: BACK

## 2025-01-14 ASSESSMENT — PATIENT HEALTH QUESTIONNAIRE - PHQ9
2. FEELING DOWN, DEPRESSED OR HOPELESS: NOT AT ALL
1. LITTLE INTEREST OR PLEASURE IN DOING THINGS: NOT AT ALL
SUM OF ALL RESPONSES TO PHQ QUESTIONS 1-9: 0
SUM OF ALL RESPONSES TO PHQ9 QUESTIONS 1 & 2: 0
SUM OF ALL RESPONSES TO PHQ QUESTIONS 1-9: 0

## 2025-01-14 ASSESSMENT — PAIN SCALES - GENERAL: PAINLEVEL_OUTOF10: 7

## 2025-01-14 NOTE — PATIENT INSTRUCTIONS
What you need to know:  1 .Advance your diet to moist meats. Follow the handout that you were given today in the office.   2. Take the recommended vitamins daily  3 No lifting greater than 40 lbs.   4. You can do light jogging, moderate walking and a recumbent bike.   5 Follow up in 2 weeks.  6. You may go into a pool.   7. If you are not able to tolerate liquids, soft foods or moist meats.   Please call our office. [phone]  8. If you have vomiting and persistent epigastric pain or chest pain. You should call our office, the doctor on-call or go to the emergency room.         Constipation  Benefiber, Miralax & similar (once or twice daily)  Milk of Magnesia (daily as needed)  Dulcolax suppository  Fleets Enema    Moist Meats   What is this diet?  This phase adds moist meats in addition to foods in Soft & Mushy  Lean protein sources  When do I begin?  When directed by your NP or surgeon, usually 4 weeks after surgery          What new foods can I eat?  Moist meat and poultry  Moist fish and seafood          Shopping Lists      Protein - include with every meal  Tuna packed in water   [place] (canned, frozen, fresh)   White flaky fish (kavon, cod, flounder, tilapia)   Canned chicken packed in water    96-99% fat free thinly sliced deli meat (ham, turkey, chicken)   Silken Tofu   Skinless turkey or chicken (prepare to a soft texture)   Lean ground meat  Lean pork (cooked until very tender, cut into small pieces)     Sample Meal Plan:  Moist Meats    Breakfast ½ cup soft cooked eggs (2) 16 grams protein   Snack (optional) Low-fat string cheese 5 grams protein   Lunch 2 slices of lean deli turkey (2 oz.), ¼ cup soft fruit or  ½ cup tuna salad made with low-fat mayonnaise 14 grams protein   14 grams protein   Snack (optional) Greek yogurt or low-fat cottage cheese or low-fat string cheese 8-15 grams protein   Dinner Soft/flaky fish (2 oz.)  ¼ cup soft cooked vegetables 14 grams protein

## 2025-01-14 NOTE — PROGRESS NOTES
Identified patient with two patient identifiers (name and ). Reviewed chart in preparation for visit and have obtained necessary documentation.    Queenie Hurley is a 49 y.o. male  Chief Complaint   Patient presents with    Post-Op Check     4 WEEK S/P ROBOTIC ASSISTED LAPAROSCOPIC GASTRIC BYPASS, ESOPHAGOGASTRODUODENOSCOPY (ERAS     Ht 1.829 m (6')   Wt (!) 157.4 kg (347 lb)   BMI 47.06 kg/m²     1. Have you been to the ER, urgent care clinic since your last visit?  Hospitalized since your last visit?no    2. Have you seen or consulted any other health care providers outside of the [facility] since your last visit?  Include any pap smears or colon screening. No    Complaint of constipation and diarrhea. Experiencing nausea and vomiting with certain foods.   
patient (or guardian if applicable) is aware that this is a billable service, which includes applicable co-pays. This Virtual Visit was conducted with patient's (and/or legal guardian's) consent. Patient identification was verified, and a caregiver was present when appropriate.   The patient was located at Home: [address] [place] [address] [address]  [facility] [address]  Provider was located at Facility (Appt Dept): [address]  [place] [address]  Confirm you are appropriately licensed, registered, or certified to deliver care in the state where the patient is located as indicated above. If you are not or unsure, please re-schedule the visit: Yes, I confirm.          --JAZMIN Ly NP on 1/14/2025 at 8:40 AM    An electronic signature was used to authenticate this note.   JAZMIN Ly NP

## 2025-01-20 ENCOUNTER — TELEPHONE (OUTPATIENT)
Age: 50
End: 2025-01-20

## 2025-01-20 NOTE — TELEPHONE ENCOUNTER
Per LPVANIA Wilson advises pt may walk on treadmill but no incline. Does not recommend other activities/use of machinery besides walking on treadmill. Pt may continue liquids and soups for a couple of days, then afterwards try going back to the foods (moist meats). If still can't keep that down, pt should contact us to schedule earlier appointment than the scheduled follow up visit.

## 2025-01-28 ENCOUNTER — PREP FOR PROCEDURE (OUTPATIENT)
Age: 50
End: 2025-01-28

## 2025-01-28 ENCOUNTER — TELEMEDICINE (OUTPATIENT)
Age: 50
End: 2025-01-28

## 2025-01-28 ENCOUNTER — TELEPHONE (OUTPATIENT)
Age: 50
End: 2025-01-28

## 2025-01-28 VITALS — BODY MASS INDEX: 42.66 KG/M2 | WEIGHT: 315 LBS | HEIGHT: 72 IN

## 2025-01-28 DIAGNOSIS — R13.10 PROBLEMS WITH SWALLOWING AND MASTICATION: ICD-10-CM

## 2025-01-28 DIAGNOSIS — Z09 SURGICAL FOLLOW-UP CARE: ICD-10-CM

## 2025-01-28 DIAGNOSIS — R11.2 NAUSEA AND VOMITING, UNSPECIFIED VOMITING TYPE: ICD-10-CM

## 2025-01-28 DIAGNOSIS — R13.10 DYSPHAGIA, UNSPECIFIED TYPE: ICD-10-CM

## 2025-01-28 DIAGNOSIS — E66.01 MORBID OBESITY: Primary | ICD-10-CM

## 2025-01-28 PROBLEM — R11.10 VOMITING: Status: ACTIVE | Noted: 2025-01-28

## 2025-01-28 PROCEDURE — 99024 POSTOP FOLLOW-UP VISIT: CPT | Performed by: NURSE PRACTITIONER

## 2025-01-28 RX ORDER — HYDROCHLOROTHIAZIDE 25 MG/1
25 TABLET ORAL DAILY
COMMUNITY

## 2025-01-28 ASSESSMENT — PATIENT HEALTH QUESTIONNAIRE - PHQ9
SUM OF ALL RESPONSES TO PHQ QUESTIONS 1-9: 0
SUM OF ALL RESPONSES TO PHQ9 QUESTIONS 1 & 2: 0
SUM OF ALL RESPONSES TO PHQ QUESTIONS 1-9: 0
2. FEELING DOWN, DEPRESSED OR HOPELESS: NOT AT ALL
1. LITTLE INTEREST OR PLEASURE IN DOING THINGS: NOT AT ALL

## 2025-01-28 ASSESSMENT — PAIN SCALES - GENERAL: PAINLEVEL_OUTOF10: 0

## 2025-01-28 NOTE — PROGRESS NOTES
Identified pt with two pt identifiers (name and ). Reviewed chart in preparation for visit and have obtained necessary documentation.    Queenie Hurley is a 49 y.o. male  Chief Complaint   Patient presents with    Weight Management     6 weeks s/p Lap Gastric Bypass     Ht 1.829 m (6')   Wt (!) 152.4 kg (336 lb)   BMI 45.57 kg/m²     1. Have you been to the ER, urgent care clinic since your last visit?  Hospitalized since your last visit?no    2. Have you seen or consulted any other health care providers outside of the [facility] since your last visit?  Include any pap smears or colon screening. no

## 2025-01-28 NOTE — PROGRESS NOTES
6 weeks status post gastric bypass.    Pt reports doing well on liquids and some soft foods.    He is having some discomfort on the right side.   Pt reports nausea and vomiting. He has an issues with vomiting with tuna, egg, baked potato, sliced turkey. He had failed to progress a diet to soft/soft meats. He feels like foods are \"sitting like a lump of peanut butter and then I vomiting. \"  He can tolerate mashed potatoes, water soups.  Heis drinking approximately 64 oz of water daily  + BM, taking Fiber supplement.   He is drinking and eating 60 grams of protein daily.     He is taking bariatric vitamins without issue.     Total weight loss since surgery 38lbs  Weight loss since last visit 11lbs  Ht 1.829 m (6')   Wt (!) 152.4 kg (336 lb)   BMI 45.57 kg/m²          Mr. Hurley has a reminder for a \"due or due soon\" health maintenance. I have asked that he contact his primary care provider for follow-up on this health maintenance.      Physical Examination: General appearance - alert, well appearing, and in no distress,  Chest - no respiratory distress.     Abdomen -incisions healing well. Some tenderness on right side of abdomen.     A/P    Doing well 6 weeks status post laparoscopic Gastric Bypass  Stay of foods he is tolerating. Will schedule him for EGD with Dr. Miramontes for possible dilation.    Focus on 50-60 grams of protein daily.   Encouraged water intake to 64 oz of non-carbonated/no calorie beverages daily.   Supplement with unflavored protein powder daily.   Continue PPI  Follow up 1 week after EGD VV  Continue vitamins.     Pt verbalized understanding and questions were answered to the best of my knowledge and ability.      Queenie Hurley, was evaluated through a synchronous (real-time) audio-video encounter. The patient (or guardian if applicable) is aware that this is a billable service, which includes applicable co-pays. This Virtual Visit was conducted with patient's (and/or legal guardian's) consent.

## 2025-01-28 NOTE — TELEPHONE ENCOUNTER
Spoke to patient to schedule his EGD with Dr Miramontes at [facility].  I offered 2/24/25 @ 8am with arrival time of 6:30am and he accepted.    I let the patient know they are required to bring someone with them that will be responsible to take them home along with their photo ID and insurance card.      If you are taking any weight lose injectable medication, you need to stop one week before procedure                            Trulicity (dulaglutide)                          Wegovy (Semaglutide)                          Ozempic (Semaglutide)                          Rybelsus (tirzepatide)                          Mounjaro (tirzepatide)                           Zepbound (tirzepatide)        I let them know once this is scheduled I will put the information in a letter along with where they need to go and pre-procedure instructions.   This letter will post to their my chart and go out in the mail.  He acknowledged thank you

## 2025-01-30 RX ORDER — ERGOCALCIFEROL 1.25 MG/1
50000 CAPSULE, LIQUID FILLED ORAL WEEKLY
Qty: 12 CAPSULE | Refills: 0 | Status: SHIPPED | OUTPATIENT
Start: 2025-01-30

## 2025-01-30 RX ORDER — POLYETHYLENE GLYCOL 3350 17 G/17G
POWDER, FOR SOLUTION ORAL
Qty: 1020 G | Refills: 2 | Status: SHIPPED | OUTPATIENT
Start: 2025-01-30

## 2025-02-08 DIAGNOSIS — Z79.4 TYPE 2 DIABETES MELLITUS WITH HYPERGLYCEMIA, WITH LONG-TERM CURRENT USE OF INSULIN (HCC): ICD-10-CM

## 2025-02-08 DIAGNOSIS — E11.65 TYPE 2 DIABETES MELLITUS WITH HYPERGLYCEMIA, WITH LONG-TERM CURRENT USE OF INSULIN (HCC): ICD-10-CM

## 2025-02-09 RX ORDER — PEN NEEDLE, DIABETIC 32 GX 1/4"
NEEDLE, DISPOSABLE MISCELLANEOUS
Qty: 100 EACH | Refills: 0 | Status: SHIPPED | OUTPATIENT
Start: 2025-02-09

## 2025-02-14 ASSESSMENT — SLEEP AND FATIGUE QUESTIONNAIRES
SELECT ANY OF THE FOLLOWING BEHAVIORS OBSERVED WHILE YOU ARE ASLEEP: LOUD SNORING
HOW LIKELY ARE YOU TO NOD OFF OR FALL ASLEEP IN A CAR, WHILE STOPPED FOR A FEW MINUTES IN TRAFFIC: WOULD NEVER DOZE
HOW LIKELY ARE YOU TO NOD OFF OR FALL ASLEEP WHILE WATCHING TV: SLIGHT CHANCE OF DOZING
AVERAGE NUMBER OF SLEEP HOURS: 4
DO YOU WORK SHIFTS: NO
DO YOU TAKE NAPS: NO
DO YOU HAVE DIFFICULTY OPERATING A MOTOR VEHICLE FOR SHORT DISTANCES (LESS THAN 100 MILES) BECAUSE YOU BECOME SLEEPY: NO
HOW LIKELY ARE YOU TO NOD OFF OR FALL ASLEEP WHILE SITTING QUIETLY AFTER LUNCH WITHOUT ALCOHOL: SLIGHT CHANCE OF DOZING
HAS YOUR MOOD BEEN AFFECTED BECAUSE YOU ARE SLEEPY OR TIRED: NO
HOW LIKELY ARE YOU TO NOD OFF OR FALL ASLEEP WHILE SITTING QUIETLY AFTER LUNCH WITHOUT ALCOHOL: SLIGHT CHANCE OF DOZING
DO YOU HAVE DIFFICULTY BEING AS ACTIVE AS YOU WANT TO BE IN THE MORNING BECAUSE YOU ARE SLEEPY OR TIRED: NO
HOW LIKELY ARE YOU TO NOD OFF OR FALL ASLEEP WHILE LYING DOWN TO REST IN THE AFTERNOON WHEN CIRCUMSTANCES PERMIT: MODERATE CHANCE OF DOZING
AVERAGE NUMBER OF SLEEP HOURS: 4
HOW LIKELY ARE YOU TO NOD OFF OR FALL ASLEEP WHEN YOU ARE A PASSENGER IN A CAR FOR AN HOUR WITHOUT A BREAK: WOULD NEVER DOZE
HOW LIKELY ARE YOU TO NOD OFF OR FALL ASLEEP WHILE SITTING AND READING: SLIGHT CHANCE OF DOZING
HOW LIKELY ARE YOU TO NOD OFF OR FALL ASLEEP WHILE SITTING INACTIVE IN A PUBLIC PLACE: WOULD NEVER DOZE
DO YOU HAVE DIFFICULTY OPERATING A MOTOR VEHICLE FOR LONG DISTANCES (GREATER THAN 100 MILES) BECAUSE YOU BECOME SLEEPY: NO
SELECT ANY OF THE FOLLOWING BEHAVIORS OBSERVED WHILE YOU ARE ASLEEP: LIGHT SNORING
DO YOU HAVE DIFFICULTY CONCENTRATING ON THE THINGS YOU DO BECAUSE YOU ARE SLEEPY OR TIRED: NO
ARE YOU BOTHERED BY WAKING UP TOO EARLY AND NOT BEING ABLE TO GET BACK TO SLEEP: YES
SELECT ANY OF THE FOLLOWING BEHAVIORS OBSERVED WHILE PATIENT ASLEEP: LOUD SNORING;LIGHT SNORING;PAUSES IN BREATHING
DO YOU GET TOO LITTLE SLEEP AT NIGHT: YES
HOW LIKELY ARE YOU TO NOD OFF OR FALL ASLEEP WHEN YOU ARE A PASSENGER IN A CAR FOR AN HOUR WITHOUT A BREAK: WOULD NEVER DOZE
DO YOU HAVE DIFFICULTY BEING AS ACTIVE AS YOU WANT TO BE IN THE EVENING BECAUSE YOU ARE SLEEPY OR TIRED: YES, LITTLE
ESS TOTAL SCORE: 5
HOW LIKELY ARE YOU TO NOD OFF OR FALL ASLEEP WHILE LYING DOWN TO REST IN THE AFTERNOON WHEN CIRCUMSTANCES PERMIT: MODERATE CHANCE OF DOZING
DO YOU GENERALLY HAVE DIFFICULTY REMEMBERING THINGS BECAUSE YOU ARE SLEEPY OR TIRED: NO
DO YOU HAVE PROBLEMS WITH FREQUENT AWAKENINGS AT NIGHT: YES
HOW LIKELY ARE YOU TO NOD OFF OR FALL ASLEEP WHILE SITTING AND READING: SLIGHT CHANCE OF DOZING
ARE YOU BOTHERED BY WAKING UP TOO EARLY AND NOT BEING ABLE TO GET BACK TO SLEEP: YES
DO YOU HAVE DIFFICULTY VISITING YOUR FAMILY OR FRIENDS IN THEIR HOME BECAUSE YOU BECOME SLEEPY OR TIRED: NO
HOW LIKELY ARE YOU TO NOD OFF OR FALL ASLEEP WHILE SITTING INACTIVE IN A PUBLIC PLACE: WOULD NEVER DOZE
DO YOU HAVE DIFFICULTY WATCHING A MOVIE OR VIDEO BECAUSE YOU BECOME SLEEPY OR TIRED: YES, A LITTLE
HOW LIKELY ARE YOU TO NOD OFF OR FALL ASLEEP WHILE SITTING AND TALKING TO SOMEONE: WOULD NEVER DOZE
NUMBER OF TIMES YOU WAKE PER NIGHT: 3
HOW LIKELY ARE YOU TO NOD OFF OR FALL ASLEEP WHILE SITTING AND TALKING TO SOMEONE: WOULD NEVER DOZE
HOW LIKELY ARE YOU TO NOD OFF OR FALL ASLEEP IN A CAR, WHILE STOPPED FOR A FEW MINUTES IN TRAFFIC: WOULD NEVER DOZE
HOW LIKELY ARE YOU TO NOD OFF OR FALL ASLEEP WHILE WATCHING TV: SLIGHT CHANCE OF DOZING
FOSQ SCORE: 19
SELECT ANY OF THE FOLLOWING BEHAVIORS OBSERVED WHILE YOU ARE ASLEEP: PAUSES IN BREATHING
DO YOU GET TOO LITTLE SLEEP AT NIGHT: YES
HAS YOUR RELATIONSHIP WITH FAMILY, FRIENDS OR WORK COLLEAGUES BEEN AFFECTED BECAUSE YOU ARE SLEEPY OR TIRED: NO

## 2025-02-17 ENCOUNTER — TELEPHONE (OUTPATIENT)
Age: 50
End: 2025-02-17

## 2025-02-17 ENCOUNTER — OFFICE VISIT (OUTPATIENT)
Age: 50
End: 2025-02-17
Payer: MEDICAID

## 2025-02-17 VITALS
SYSTOLIC BLOOD PRESSURE: 140 MMHG | OXYGEN SATURATION: 96 % | BODY MASS INDEX: 42.66 KG/M2 | WEIGHT: 315 LBS | HEART RATE: 81 BPM | HEIGHT: 72 IN | DIASTOLIC BLOOD PRESSURE: 77 MMHG | TEMPERATURE: 97.9 F

## 2025-02-17 DIAGNOSIS — G47.33 OSA (OBSTRUCTIVE SLEEP APNEA): Primary | ICD-10-CM

## 2025-02-17 DIAGNOSIS — I10 PRIMARY HYPERTENSION: ICD-10-CM

## 2025-02-17 PROCEDURE — 3078F DIAST BP <80 MM HG: CPT | Performed by: INTERNAL MEDICINE

## 2025-02-17 PROCEDURE — 3077F SYST BP >= 140 MM HG: CPT | Performed by: INTERNAL MEDICINE

## 2025-02-17 PROCEDURE — 99204 OFFICE O/P NEW MOD 45 MIN: CPT | Performed by: INTERNAL MEDICINE

## 2025-02-17 NOTE — PATIENT INSTRUCTIONS
[address]  [place] [address]  Tel.  [phone]  Fax. [phone] [address]  [place] [address]  Tel.  [phone]  Fax. [phone] [address] [address]  Tel.  [phone]  Fax. [phone]     Sleep Apnea: After Your Visit  Your Care Instructions  Sleep apnea occurs when you frequently stop breathing for 10 seconds or longer during sleep. It can be mild to severe, based on the number of times per hour that you stop breathing or have slowed breathing. Blocked or narrowed airways in your nose, mouth, or throat can cause sleep apnea. Your airway can become blocked when your throat muscles and tongue relax during sleep.  Sleep apnea is common, occurring in 1 out of 20 individuals.  Individuals having any of the following characteristics should be evaluated and treated right away due to high risk and detrimental consequences from untreated sleep apnea:  Obesity  Congestive Heart failure  Atrial Fibrillation  Uncontrolled Hypertension  Type II Diabetes  Night-time Arrhythmias  Stroke  Pulmonary Hypertension  High-risk Driving Populations (pilots, truck drivers, etc.)  Patients Considering Weight-loss Surgery    How do you know you have sleep apnea?  You probably have sleep apnea if you answer 'yes' to 3 or more of the following questions:  S - Have you been told that you Snore?   T - Are you often Tired during the day?  O - Has anyone Observed you stop breathing while sleeping?  P- Do you have (or are being treated for) high blood Pressure?    B - Are you obese (Body Mass Index > 35)?  A - Is your Age 50 years old or older?  N - Is your Neck size greater than 16 inches?  G - Are you male Gender?  A sleep physician can prescribe a breathing device that prevents tissues in the throat from blocking your airway. Or your doctor may recommend using a dental device (oral breathing device) to help keep your airway open. In some cases, surgery may

## 2025-02-17 NOTE — TELEPHONE ENCOUNTER
Patient wants to cancel procedure on 3/24/25 and will call back if he wants to reschedule tin the future.

## 2025-02-17 NOTE — PROGRESS NOTES
[address],    [place] [address]  Tel.  [phone]    Fax. [phone]     [address],   [place] [address]  Tel.  [phone]    Fax. [phone] [address] [place] [address]  Tel.  [phone]    Fax. [phone]       Queenie Hurley is a 49 y.o. year old male seen for evaluation of a sleep disorder.       ASSESSMENT/PLAN:     Diagnosis Orders   1. THEA (obstructive sleep apnea)  PAT - Home Sleep Test      2. Primary hypertension        3. BMI 45.0-49.9, adult            Patient has a history and examination consistent with the diagnosis of sleep apnea.    Return for follow-up after testing is completed.    * Sleep testing was ordered for reevaluation due to interim weight loss.    Orders Placed This Encounter   Procedures    PAT - Home Sleep Test     Standing Status:   Future     Standing Expiration Date:   8/17/2025     Order Specific Question:   Location For Sleep Study     Answer:   [facility]       * He was provided information on sleep apnea including corresponding risk factors and the importance of proper treatment.     * Treatment options were reviewed in detail. he would like to proceed with PAP therapy (new device 8 - 12 cmH2O). Patient will be seen in follow-up in 6-8 weeks after PAP setup to gauge treatment response and adherence to therapy.     * The patient was counseled regarding proper sleep hygiene, with emphasis on ensuring sufficient total sleep time; safe driving and the benefits of exercise and weight loss.      * All of his questions were addressed.    2. Hypertension -  continue on current regimen, he will continue to monitor his BP and follow up with his primary care provider for reevaluation/adjustment of medications if warranted.  I have reviewed the relationship between hypertension as it relates to sleep-disordered breathing.    3. Recommended a dedicated

## 2025-03-04 ENCOUNTER — TELEMEDICINE (OUTPATIENT)
Age: 50
End: 2025-03-04

## 2025-03-04 VITALS — HEART RATE: 85 BPM | WEIGHT: 315 LBS | HEIGHT: 72 IN | BODY MASS INDEX: 42.66 KG/M2

## 2025-03-04 DIAGNOSIS — E66.01 MORBID OBESITY: Primary | ICD-10-CM

## 2025-03-04 DIAGNOSIS — Z09 SURGICAL FOLLOW-UP CARE: ICD-10-CM

## 2025-03-04 PROCEDURE — 99024 POSTOP FOLLOW-UP VISIT: CPT | Performed by: NURSE PRACTITIONER

## 2025-03-04 ASSESSMENT — PATIENT HEALTH QUESTIONNAIRE - PHQ9
2. FEELING DOWN, DEPRESSED OR HOPELESS: NOT AT ALL
SUM OF ALL RESPONSES TO PHQ QUESTIONS 1-9: 0
SUM OF ALL RESPONSES TO PHQ QUESTIONS 1-9: 0
1. LITTLE INTEREST OR PLEASURE IN DOING THINGS: NOT AT ALL
SUM OF ALL RESPONSES TO PHQ QUESTIONS 1-9: 0
SUM OF ALL RESPONSES TO PHQ QUESTIONS 1-9: 0

## 2025-03-04 NOTE — PROGRESS NOTES
2.5 months status post gastric bypass.    He states that he went to the gym he felt weak then started feeling ill. Sore throat. Fever.   He was having chest pain yesterday and was not feeling well. He did not go to the Er or urgent care to be evaluated. No cough. Today he is feeling better.   Pt reports doing well on liquids, soft, soft meat and solid foods. He feels that he is able to tolerate foods better. He cancelled his endoscopy because he is able to tolerate foods better.   Pt reports no nausea and no vomiting  Heis drinking approximately 64 oz of water daily  + BM  He is drinking and eating 50 grams of protein daily.     He is taking bariatric vitamins without issue.     Total weight loss since surgery 30lbs  Weight gained since last visit 8lbs  Pulse 85   Ht 1.829 m (6')   Wt (!) 155.6 kg (343 lb)   BMI 46.52 kg/m²            Mr. Hurley has a reminder for a \"due or due soon\" health maintenance. I have asked that he contact his primary care provider for follow-up on this health maintenance.      Physical Examination: General appearance - alert, well appearing, and in no distress,  Chest - no respiratory distress    Abdomen -non-tender per patient.     A/P    Doing better 2.5 weeks status post laparoscopic Gastric Bypass  Advised if he continues to have chest pain to go to ER or urgent care. He denies pain today.   Continue solid foods. If he feels like food are getting stuck we will need to revisit Endoscopy with possible dilation with Dr. Miramontes  Advised patient regard to diet that is high-protein, low-fat, low-sugar, limited carbohydrates. Strive for 50-60 grams of protein daily. If having a snack, foods that are protein or fiber rich.. No eating/drinking together, chew foods well, and portion control. Measure meals. Discussed snacking behavior and to stiill pay attention to behavioral factor and habits. Drink at least 40-64 ounces of water or non-calorie/non-carbonated beverages daily. Continue vitamin

## 2025-03-04 NOTE — PROGRESS NOTES
Identified patient with two patient identifiers (name and ). Reviewed chart in preparation for visit and have obtained necessary documentation.    Queenie Hurley is a 49 y.o. male  No chief complaint on file.    BP (!) 161/88   Pulse 88   Ht 1.829 m (6')   Wt (!) 155.6 kg (343 lb)   BMI 46.52 kg/m²     1. Have you been to the ER, urgent care clinic since your last visit?  Hospitalized since your last visit?no    2. Have you seen or consulted any other health care providers outside of the [facility] since your last visit?  Include any pap smears or colon screening. no     Pt stated he has chronic back pain but no pain due to surgery, yesterday vision was blurry horrible headache for 3 days, getting over a cold. After 2 days going to the gym felt no energy/strength walking in the store. Doesn't experience these symptoms as much today

## 2025-03-13 ENCOUNTER — PROCEDURE VISIT (OUTPATIENT)
Age: 50
End: 2025-03-13

## 2025-03-13 ENCOUNTER — HOSPITAL ENCOUNTER (OUTPATIENT)
Facility: HOSPITAL | Age: 50
Discharge: HOME | End: 2025-03-16
Payer: MEDICAID

## 2025-03-13 DIAGNOSIS — G47.33 OSA (OBSTRUCTIVE SLEEP APNEA): ICD-10-CM

## 2025-03-13 DIAGNOSIS — G47.33 OSA (OBSTRUCTIVE SLEEP APNEA): Primary | ICD-10-CM

## 2025-03-13 PROCEDURE — 95800 SLP STDY UNATTENDED: CPT | Performed by: INTERNAL MEDICINE

## 2025-03-13 NOTE — PROGRESS NOTES
[address]  [place] [address]  Tel.  [phone]  Fax. [phone] [address]  [place] [address]  Tel.  [phone]  Fax. [phone] [address] [place] [address]  Tel.  [phone]  Fax. [phone]       Queenie Hurley is seen today to receive a WatchPAT home sleep apnea testing (HSAT) device.    The Net[employer] HSAT Agreement was reviewed and endorsed by patient.  General information regarding operation of the HSAT device was provided.  Patient viewed the [employer] instructional video while in the clinic.  Patient was advised to contact patient support for any problems using the device.  Patient was advised to complete the Morning Questionnaire after awakening/ending the test.    There were no vitals taken for this visit.    Patient will return the HSAT device by noon unless otherwise approved.  Patient will be contacted with results once the study has been reviewed by the physician, typically within 15 business days.    [employer] Serial Number [id number]

## 2025-03-14 ENCOUNTER — PROCEDURE VISIT (OUTPATIENT)
Age: 50
End: 2025-03-14

## 2025-03-14 DIAGNOSIS — G47.33 OSA (OBSTRUCTIVE SLEEP APNEA): Primary | ICD-10-CM

## 2025-03-27 ENCOUNTER — PATIENT MESSAGE (OUTPATIENT)
Dept: PRIMARY CARE CLINIC | Facility: CLINIC | Age: 50
End: 2025-03-27

## 2025-03-27 DIAGNOSIS — E11.65 TYPE 2 DIABETES MELLITUS WITH HYPERGLYCEMIA, WITH LONG-TERM CURRENT USE OF INSULIN (HCC): ICD-10-CM

## 2025-03-27 DIAGNOSIS — K59.03 DRUG-INDUCED CONSTIPATION: Primary | ICD-10-CM

## 2025-03-27 DIAGNOSIS — Z79.4 TYPE 2 DIABETES MELLITUS WITH HYPERGLYCEMIA, WITH LONG-TERM CURRENT USE OF INSULIN (HCC): ICD-10-CM

## 2025-03-28 RX ORDER — POLYETHYLENE GLYCOL 3350 17 G/17G
17 POWDER, FOR SOLUTION ORAL 2 TIMES DAILY
Qty: 3060 G | Refills: 1 | Status: SHIPPED | OUTPATIENT
Start: 2025-03-28

## 2025-03-28 RX ORDER — PEN NEEDLE, DIABETIC 32 GX 1/4"
NEEDLE, DISPOSABLE MISCELLANEOUS
Qty: 100 EACH | Refills: 0 | Status: SHIPPED | OUTPATIENT
Start: 2025-03-28

## 2025-03-28 SDOH — ECONOMIC STABILITY: FOOD INSECURITY: WITHIN THE PAST 12 MONTHS, YOU WORRIED THAT YOUR FOOD WOULD RUN OUT BEFORE YOU GOT MONEY TO BUY MORE.: OFTEN TRUE

## 2025-03-28 SDOH — ECONOMIC STABILITY: FOOD INSECURITY: WITHIN THE PAST 12 MONTHS, THE FOOD YOU BOUGHT JUST DIDN'T LAST AND YOU DIDN'T HAVE MONEY TO GET MORE.: OFTEN TRUE

## 2025-03-28 SDOH — ECONOMIC STABILITY: INCOME INSECURITY: IN THE LAST 12 MONTHS, WAS THERE A TIME WHEN YOU WERE NOT ABLE TO PAY THE MORTGAGE OR RENT ON TIME?: NO

## 2025-03-28 SDOH — ECONOMIC STABILITY: TRANSPORTATION INSECURITY
IN THE PAST 12 MONTHS, HAS THE LACK OF TRANSPORTATION KEPT YOU FROM MEDICAL APPOINTMENTS OR FROM GETTING MEDICATIONS?: NO

## 2025-03-30 ENCOUNTER — CLINICAL DOCUMENTATION (OUTPATIENT)
Age: 50
End: 2025-03-30

## 2025-03-30 DIAGNOSIS — G47.33 OSA (OBSTRUCTIVE SLEEP APNEA): Primary | ICD-10-CM

## 2025-03-31 ENCOUNTER — OFFICE VISIT (OUTPATIENT)
Dept: PRIMARY CARE CLINIC | Facility: CLINIC | Age: 50
End: 2025-03-31
Payer: MEDICAID

## 2025-03-31 ENCOUNTER — CLINICAL DOCUMENTATION (OUTPATIENT)
Age: 50
End: 2025-03-31

## 2025-03-31 VITALS
WEIGHT: 315 LBS | BODY MASS INDEX: 42.66 KG/M2 | TEMPERATURE: 98.8 F | HEIGHT: 72 IN | OXYGEN SATURATION: 99 % | DIASTOLIC BLOOD PRESSURE: 88 MMHG | HEART RATE: 79 BPM | SYSTOLIC BLOOD PRESSURE: 138 MMHG | RESPIRATION RATE: 16 BRPM

## 2025-03-31 DIAGNOSIS — G89.29 CHRONIC PAIN OF RIGHT ANKLE: ICD-10-CM

## 2025-03-31 DIAGNOSIS — M25.571 CHRONIC PAIN OF RIGHT ANKLE: ICD-10-CM

## 2025-03-31 DIAGNOSIS — R00.0 TACHYCARDIA, UNSPECIFIED: ICD-10-CM

## 2025-03-31 DIAGNOSIS — I10 ESSENTIAL (PRIMARY) HYPERTENSION: ICD-10-CM

## 2025-03-31 DIAGNOSIS — F33.1 MAJOR DEPRESSIVE DISORDER, RECURRENT, MODERATE (HCC): ICD-10-CM

## 2025-03-31 DIAGNOSIS — E11.65 TYPE 2 DIABETES MELLITUS WITH HYPERGLYCEMIA, WITH LONG-TERM CURRENT USE OF INSULIN (HCC): Primary | ICD-10-CM

## 2025-03-31 DIAGNOSIS — E78.5 DYSLIPIDEMIA, GOAL LDL BELOW 100: ICD-10-CM

## 2025-03-31 DIAGNOSIS — G44.52 NEW DAILY PERSISTENT HEADACHE (NDPH): ICD-10-CM

## 2025-03-31 DIAGNOSIS — L20.82 FLEXURAL ECZEMA: ICD-10-CM

## 2025-03-31 DIAGNOSIS — K21.9 GASTRO-ESOPHAGEAL REFLUX DISEASE WITHOUT ESOPHAGITIS: ICD-10-CM

## 2025-03-31 DIAGNOSIS — E55.9 VITAMIN D DEFICIENCY: ICD-10-CM

## 2025-03-31 DIAGNOSIS — Z79.4 TYPE 2 DIABETES MELLITUS WITH HYPERGLYCEMIA, WITH LONG-TERM CURRENT USE OF INSULIN (HCC): Primary | ICD-10-CM

## 2025-03-31 PROCEDURE — 99214 OFFICE O/P EST MOD 30 MIN: CPT | Performed by: NURSE PRACTITIONER

## 2025-03-31 PROCEDURE — 3075F SYST BP GE 130 - 139MM HG: CPT | Performed by: NURSE PRACTITIONER

## 2025-03-31 PROCEDURE — 3079F DIAST BP 80-89 MM HG: CPT | Performed by: NURSE PRACTITIONER

## 2025-03-31 RX ORDER — INSULIN GLARGINE 100 [IU]/ML
10 INJECTION, SOLUTION SUBCUTANEOUS NIGHTLY
COMMUNITY
End: 2025-03-31 | Stop reason: SDUPTHER

## 2025-03-31 RX ORDER — CLOBETASOL PROPIONATE 0.5 MG/G
OINTMENT TOPICAL
Qty: 45 G | Refills: 2 | Status: SHIPPED | OUTPATIENT
Start: 2025-03-31

## 2025-03-31 RX ORDER — DILTIAZEM HYDROCHLORIDE 240 MG/1
240 CAPSULE, COATED, EXTENDED RELEASE ORAL DAILY
Qty: 90 CAPSULE | Refills: 1 | Status: SHIPPED | OUTPATIENT
Start: 2025-03-31

## 2025-03-31 RX ORDER — OMEPRAZOLE 40 MG/1
40 CAPSULE, DELAYED RELEASE ORAL DAILY
Qty: 90 CAPSULE | Refills: 3 | Status: SHIPPED | OUTPATIENT
Start: 2025-03-31

## 2025-03-31 RX ORDER — LISINOPRIL 40 MG/1
40 TABLET ORAL DAILY
Qty: 90 TABLET | Refills: 1 | Status: SHIPPED | OUTPATIENT
Start: 2025-03-31

## 2025-03-31 RX ORDER — ATENOLOL 50 MG/1
50 TABLET ORAL DAILY
Qty: 90 TABLET | Refills: 1 | Status: SHIPPED | OUTPATIENT
Start: 2025-03-31

## 2025-03-31 RX ORDER — DULOXETIN HYDROCHLORIDE 60 MG/1
60 CAPSULE, DELAYED RELEASE ORAL DAILY
Qty: 90 CAPSULE | Refills: 1 | Status: SHIPPED | OUTPATIENT
Start: 2025-03-31

## 2025-03-31 RX ORDER — INSULIN GLARGINE 100 [IU]/ML
10 INJECTION, SOLUTION SUBCUTANEOUS NIGHTLY
Qty: 5 ADJUSTABLE DOSE PRE-FILLED PEN SYRINGE | Refills: 2 | Status: SHIPPED | OUTPATIENT
Start: 2025-03-31

## 2025-03-31 RX ORDER — ROSUVASTATIN CALCIUM 10 MG/1
10 TABLET, COATED ORAL DAILY
Qty: 90 TABLET | Refills: 1 | Status: SHIPPED | OUTPATIENT
Start: 2025-03-31

## 2025-03-31 ASSESSMENT — PATIENT HEALTH QUESTIONNAIRE - PHQ9
9. THOUGHTS THAT YOU WOULD BE BETTER OFF DEAD, OR OF HURTING YOURSELF: NOT AT ALL
SUM OF ALL RESPONSES TO PHQ QUESTIONS 1-9: 0
SUM OF ALL RESPONSES TO PHQ QUESTIONS 1-9: 0
2. FEELING DOWN, DEPRESSED OR HOPELESS: NOT AT ALL
5. POOR APPETITE OR OVEREATING: NOT AT ALL
1. LITTLE INTEREST OR PLEASURE IN DOING THINGS: NOT AT ALL
7. TROUBLE CONCENTRATING ON THINGS, SUCH AS READING THE NEWSPAPER OR WATCHING TELEVISION: NOT AT ALL
3. TROUBLE FALLING OR STAYING ASLEEP: NOT AT ALL
4. FEELING TIRED OR HAVING LITTLE ENERGY: NOT AT ALL
6. FEELING BAD ABOUT YOURSELF - OR THAT YOU ARE A FAILURE OR HAVE LET YOURSELF OR YOUR FAMILY DOWN: NOT AT ALL
8. MOVING OR SPEAKING SO SLOWLY THAT OTHER PEOPLE COULD HAVE NOTICED. OR THE OPPOSITE, BEING SO FIGETY OR RESTLESS THAT YOU HAVE BEEN MOVING AROUND A LOT MORE THAN USUAL: NOT AT ALL
SUM OF ALL RESPONSES TO PHQ QUESTIONS 1-9: 0
SUM OF ALL RESPONSES TO PHQ QUESTIONS 1-9: 0
10. IF YOU CHECKED OFF ANY PROBLEMS, HOW DIFFICULT HAVE THESE PROBLEMS MADE IT FOR YOU TO DO YOUR WORK, TAKE CARE OF THINGS AT HOME, OR GET ALONG WITH OTHER PEOPLE: NOT DIFFICULT AT ALL

## 2025-03-31 ASSESSMENT — ENCOUNTER SYMPTOMS: SHORTNESS OF BREATH: 0

## 2025-03-31 NOTE — PROGRESS NOTES
PAP order faxed to [employer] company and patient notified via Reactivity. Patient also scheduled for first adherence appointment.

## 2025-03-31 NOTE — PROGRESS NOTES
Chief Complaint   Patient presents with    Follow-up     /88 (BP Site: Right Lower Arm, Patient Position: Sitting)   Pulse 79   Temp 98.8 °F (37.1 °C) (Oral)   Resp 16   Ht 1.829 m (6')   Wt (!) 152.4 kg (336 lb)   SpO2 99%   BMI 45.57 kg/m²     \"Have you been to the ER, urgent care clinic since your last visit?  Hospitalized since your last visit?\"    NO    “Have you seen or consulted any other health care providers outside our system since your last visit?”    NO      “Have you had a colorectal cancer screening such as a colonoscopy/FIT/Cologuard?    NO    No colonoscopy on file  No cologuard on file  No FIT/FOBT on file   No flexible sigmoidoscopy on file     “Have you had a diabetic eye exam?”    NO     Date of last diabetic eye exam: 8/14/2017

## 2025-03-31 NOTE — PROGRESS NOTES
Queenie Hurley is a 49 y.o. male who was seen in clinic today (3/31/2025).    Assessment & Plan:   Below is the assessment and plan developed based on review of pertinent history, physical exam, labs, studies, and medications.    1. Type 2 diabetes mellitus with hyperglycemia, with long-term current use of insulin (HCC)  Comments:  chronic, stable.  will monitor labs.   eventually would like to get back on mounjaro and off insulin at 6 months post bariatric surgery  Orders:  -     empagliflozin (JARDIANCE) 25 MG tablet; Take 1 tablet by mouth daily, Disp-90 tablet, R-1Normal  -     insulin glargine (LANTUS SOLOSTAR) 100 UNIT/ML injection pen; Inject 10 Units into the skin nightly, Disp-5 Adjustable Dose Pre-filled Pen Syringe, R-2Normal  -     CBC  -     Comprehensive Metabolic Panel  -     Hemoglobin A1C  -     Lipid Panel  2. Gastro-esophageal reflux disease without esophagitis  -     omeprazole (PRILOSEC) 40 MG delayed release capsule; Take 1 capsule by mouth daily, Disp-90 capsule, R-3Normal  3. Essential (primary) hypertension  Comments:  stable on medication.  will continue to monitor post op  Orders:  -     lisinopril (PRINIVIL;ZESTRIL) 40 MG tablet; Take 1 tablet by mouth daily, Disp-90 tablet, R-1Normal  -     atenolol (TENORMIN) 50 MG tablet; Take 1 tablet by mouth daily, Disp-90 tablet, R-1Normal  4. Tachycardia, unspecified  Comments:  stable on diltiazem.  Work up completed by cardiology, Dr. Chun.   Orders:  -     dilTIAZem (CARTIA XT) 240 MG extended release capsule; Take 1 capsule by mouth daily, Disp-90 capsule, R-1Normal  5. Major depressive disorder, recurrent, moderate (HCC)  Comments:  stable on cymbalta.   Orders:  -     DULoxetine (CYMBALTA) 60 MG extended release capsule; Take 1 capsule by mouth daily, Disp-90 capsule, R-1Normal  6. Dyslipidemia, goal LDL below 100  Comments:  due for fasting labs to assess.   contiue crestor  Orders:  -     rosuvastatin (CRESTOR) 10 MG tablet; Take 1 tablet by

## 2025-04-01 LAB
25(OH)D3+25(OH)D2 SERPL-MCNC: 58.3 NG/ML (ref 30–100)
CHOLEST SERPL-MCNC: 168 MG/DL (ref 100–199)
ERYTHROCYTE [DISTWIDTH] IN BLOOD BY AUTOMATED COUNT: 14.2 % (ref 11.6–15.4)
HBA1C MFR BLD: 7.7 % (ref 4.8–5.6)
HCT VFR BLD AUTO: 49.3 % (ref 37.5–51)
HDLC SERPL-MCNC: 45 MG/DL
HGB BLD-MCNC: 15.7 G/DL (ref 13–17.7)
LDLC SERPL CALC-MCNC: 105 MG/DL (ref 0–99)
MCH RBC QN AUTO: 27.5 PG (ref 26.6–33)
MCHC RBC AUTO-ENTMCNC: 31.8 G/DL (ref 31.5–35.7)
MCV RBC AUTO: 87 FL (ref 79–97)
PLATELET # BLD AUTO: 248 X10E3/UL (ref 150–450)
RBC # BLD AUTO: 5.7 X10E6/UL (ref 4.14–5.8)
TRIGL SERPL-MCNC: 99 MG/DL (ref 0–149)
VLDLC SERPL CALC-MCNC: 18 MG/DL (ref 5–40)
WBC # BLD AUTO: 13 X10E3/UL (ref 3.4–10.8)

## 2025-04-03 LAB
ALBUMIN SERPL-MCNC: 4.6 G/DL (ref 4.1–5.1)
ALP SERPL-CCNC: 127 IU/L (ref 44–121)
ALT SERPL-CCNC: 83 IU/L (ref 0–44)
AST SERPL-CCNC: 42 IU/L (ref 0–40)
BILIRUB SERPL-MCNC: 0.4 MG/DL (ref 0–1.2)
BUN SERPL-MCNC: 22 MG/DL (ref 6–24)
BUN/CREAT SERPL: 27 (ref 9–20)
CALCIUM SERPL-MCNC: 9.9 MG/DL (ref 8.7–10.2)
CHLORIDE SERPL-SCNC: 98 MMOL/L (ref 96–106)
CO2 SERPL-SCNC: 27 MMOL/L (ref 20–29)
CREAT SERPL-MCNC: 0.82 MG/DL (ref 0.76–1.27)
EGFRCR SERPLBLD CKD-EPI 2021: 109 ML/MIN/1.73
GLOBULIN SER CALC-MCNC: 3.1 G/DL (ref 1.5–4.5)
GLUCOSE SERPL-MCNC: 119 MG/DL (ref 70–99)
POTASSIUM SERPL-SCNC: 4.3 MMOL/L (ref 3.5–5.2)
PROT SERPL-MCNC: 7.7 G/DL (ref 6–8.5)
SODIUM SERPL-SCNC: 138 MMOL/L (ref 134–144)

## 2025-04-04 ENCOUNTER — RESULTS FOLLOW-UP (OUTPATIENT)
Dept: PRIMARY CARE CLINIC | Facility: CLINIC | Age: 50
End: 2025-04-04

## 2025-04-04 DIAGNOSIS — R11.2 NAUSEA AND VOMITING, UNSPECIFIED VOMITING TYPE: Primary | ICD-10-CM

## 2025-04-04 DIAGNOSIS — R79.89 ELEVATED LIVER FUNCTION TESTS: ICD-10-CM

## 2025-04-10 ENCOUNTER — HOSPITAL ENCOUNTER (OUTPATIENT)
Facility: HOSPITAL | Age: 50
Discharge: HOME | End: 2025-04-13
Payer: MEDICAID

## 2025-04-10 DIAGNOSIS — R79.89 ELEVATED LIVER FUNCTION TESTS: ICD-10-CM

## 2025-04-10 DIAGNOSIS — R11.2 NAUSEA AND VOMITING, UNSPECIFIED VOMITING TYPE: ICD-10-CM

## 2025-04-10 PROCEDURE — 76705 ECHO EXAM OF ABDOMEN: CPT

## 2025-04-14 ENCOUNTER — RESULTS FOLLOW-UP (OUTPATIENT)
Dept: PRIMARY CARE CLINIC | Facility: CLINIC | Age: 50
End: 2025-04-14

## 2025-04-30 ENCOUNTER — OFFICE VISIT (OUTPATIENT)
Age: 50
End: 2025-04-30

## 2025-04-30 DIAGNOSIS — E66.01 MORBID OBESITY (HCC): Primary | ICD-10-CM

## 2025-04-30 NOTE — PROGRESS NOTES
Post-Operative Bariatric Nutrition Counseling - Phone Consult     Physician/Surgeon:Zara Donis N.P.   Name: Queenie Hurley  : 1975        Reason for visit: Follow up nutrition education and counseling post gastric bypass    Queenie Hurley was evaluated through a synchronous (real-time) audio encounter. Patient identification was verified at the start of the visit.   The patient was located at Home: [address] [place] [address] [address] [place] [id number].  The provider was located at Home (Appt Dept State): [place].  Confirm you are appropriately licensed, registered, or certified to deliver care in the state where the patient is located as indicated above. If you are not or unsure, please re-schedule the visit: Yes, I confirm.     ASSESSMENT:  Date of surgery:2024    Medications/Supplements:   Prior to Admission medications    Medication Sig Start Date End Date Taking? Authorizing Provider   empagliflozin (JARDIANCE) 25 MG tablet Take 1 tablet by mouth daily 3/31/25   Shemar Lackey APRN - NP   insulin glargine (LANTUS SOLOSTAR) 100 UNIT/ML injection pen Inject 10 Units into the skin nightly 3/31/25   Shemar Lackey APRN - NP   omeprazole (PRILOSEC) 40 MG delayed release capsule Take 1 capsule by mouth daily 3/31/25   Shemar Lackey APRN - NP   lisinopril (PRINIVIL;ZESTRIL) 40 MG tablet Take 1 tablet by mouth daily 3/31/25   Shemar Lackey APRN - NP   dilTIAZem (CARTIA XT) 240 MG extended release capsule Take 1 capsule by mouth daily 3/31/25   Shemar Lackey APRN - NP   DULoxetine (CYMBALTA) 60 MG extended release capsule Take 1 capsule by mouth daily 3/31/25   Shemar Lackey APRN - NP   rosuvastatin (CRESTOR) 10 MG tablet Take 1 tablet by mouth daily 3/31/25   Shemar Lackey APRN - NP   clobetasol (TEMOVATE) 0.05 % ointment Apply topically 2 times daily. 3/31/25   Shemar Lackey APRN - NP   atenolol (TENORMIN) 50 MG tablet Take 1 tablet by mouth daily 3/31/25   Shemar Lackey APRN - NP

## 2025-05-23 ENCOUNTER — HOSPITAL ENCOUNTER (EMERGENCY)
Facility: HOSPITAL | Age: 50
Discharge: HOME | End: 2025-05-23
Attending: EMERGENCY MEDICINE
Payer: MEDICAID

## 2025-05-23 VITALS
SYSTOLIC BLOOD PRESSURE: 142 MMHG | WEIGHT: 315 LBS | TEMPERATURE: 99 F | HEART RATE: 95 BPM | HEIGHT: 72 IN | OXYGEN SATURATION: 96 % | DIASTOLIC BLOOD PRESSURE: 96 MMHG | RESPIRATION RATE: 16 BRPM | BODY MASS INDEX: 42.66 KG/M2

## 2025-05-23 DIAGNOSIS — R03.0 ELEVATED BLOOD PRESSURE READING: ICD-10-CM

## 2025-05-23 DIAGNOSIS — R21 RASH AND OTHER NONSPECIFIC SKIN ERUPTION: Primary | ICD-10-CM

## 2025-05-23 PROCEDURE — 99282 EMERGENCY DEPT VISIT SF MDM: CPT

## 2025-05-23 ASSESSMENT — PAIN SCALES - GENERAL: PAINLEVEL_OUTOF10: 0

## 2025-05-23 ASSESSMENT — PAIN - FUNCTIONAL ASSESSMENT: PAIN_FUNCTIONAL_ASSESSMENT: 0-10

## 2025-05-23 NOTE — DISCHARGE INSTRUCTIONS
Please continue your topical clobetasol as prescribed and continue good hygiene with mild soaps.  It is recommended to follow up with your primary care given your visit today here.  If symptoms worsen or new concerning symptoms arise, please report tenderness when department.    Thank you for allowing us to provide you with medical care today.  We realize that you have many choices for your emergency care needs.  We thank you for choosing [facility].  Please choose us in the future for any continued health care needs.     The exam and treatment you received in the Emergency Department were for an emergent problem and are not intended as complete care. It is important that you follow up with a doctor, nurse practitioner, or physician assistant for ongoing care. If your symptoms worsen or you do not improve as expected and you are unable to reach your usual health care provider, you should return to the Emergency Department.  We are available 24 hours a day.     Please make an appointment with your health care provider(s) for follow up of your Emergency Department visit.  Take this sheet with you when you go to your follow-up visit.
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
## 2025-05-23 NOTE — ED TRIAGE NOTES
Pt reports to ED w/ cc of rash on thighs and arms. States this happened after he went to the gym after not going for a week

## 2025-05-30 DIAGNOSIS — E11.65 TYPE 2 DIABETES MELLITUS WITH HYPERGLYCEMIA, WITH LONG-TERM CURRENT USE OF INSULIN (HCC): ICD-10-CM

## 2025-05-30 DIAGNOSIS — B37.9 CANDIDIASIS: ICD-10-CM

## 2025-05-30 DIAGNOSIS — Z79.4 TYPE 2 DIABETES MELLITUS WITH HYPERGLYCEMIA, WITH LONG-TERM CURRENT USE OF INSULIN (HCC): ICD-10-CM

## 2025-05-30 RX ORDER — NYSTATIN 100000 U/G
OINTMENT TOPICAL
Qty: 30 G | Refills: 0 | Status: SHIPPED | OUTPATIENT
Start: 2025-05-30

## 2025-05-30 RX ORDER — ERGOCALCIFEROL 1.25 MG/1
50000 CAPSULE, LIQUID FILLED ORAL WEEKLY
Qty: 12 CAPSULE | Refills: 0 | OUTPATIENT
Start: 2025-05-30

## 2025-05-30 RX ORDER — NYSTATIN 100000 [USP'U]/G
POWDER TOPICAL
Qty: 60 G | Refills: 0 | Status: SHIPPED | OUTPATIENT
Start: 2025-05-30

## 2025-05-30 RX ORDER — PEN NEEDLE, DIABETIC 32 GX 1/4"
NEEDLE, DISPOSABLE MISCELLANEOUS
Qty: 100 EACH | Refills: 0 | Status: SHIPPED | OUTPATIENT
Start: 2025-05-30

## 2025-06-06 ENCOUNTER — OFFICE VISIT (OUTPATIENT)
Age: 50
End: 2025-06-06

## 2025-06-06 DIAGNOSIS — E66.01 MORBID OBESITY (HCC): Primary | ICD-10-CM

## 2025-06-06 NOTE — PROGRESS NOTES
Post-Operative Bariatric Nutrition Counseling - Follow Up Phone Consult      Physician/Surgeon:Zara Donis N.P.   Name: Queenie Hurley                : 1975                                    Reason for visit: Follow up nutrition education and counseling post gastric bypass     Queenie Hurley was evaluated through a synchronous (real-time) audio encounter. Patient identification was verified at the start of the visit.   The patient was located at Home: [address] [place] [address] [address] Anthony [address]  [facility] [address].  The provider was located at Home (Appt Dept State): [place].  Confirm you are appropriately licensed, registered, or certified to deliver care in the state where the patient is located as indicated above. If you are not or unsure, please re-schedule the visit: Yes, I confirm.      ASSESSMENT:  Date of surgery:2024     Pt takes recommended supplements as prescribed:              Multivitamin: yes daily               Calcium Citrate: inconsistent        Food Allergies/Intolerances: none     Anthropometrics:          Ht:  6'     Reported Wt: 333#  Previous Month's Reported Wt (25): 332#          Pre-op wt: 374#   Net Wt Loss: 41# (11%)       IBW: 178#       %IBW: 186%     BMI: 45            Category: obesity III      Reported wt history: Pt is about 4.5 months s/p gastric bypass. Was initially having difficulty tolerating certain foods but has since improved. Was scheduled for endoscopy and pt canceled d/t improvement in food tolerance. Recently diagnosed with fatty liver disease. Is concerned about blood sugar regulation and seeking nutrition guidelines for improved blood sugar management.     Follow Up (25): Eliminated mashed potatoes and bhupendra steak.Has worked on eating 3 meals a day and protein at each meal. Eating mostly chicken as protein along with protein shakes. Had food poisoning last month which has deterred from eating certain foods. Exercise has been limited d/t ankle issues.

## 2025-06-09 ASSESSMENT — SLEEP AND FATIGUE QUESTIONNAIRES
HOW LIKELY ARE YOU TO NOD OFF OR FALL ASLEEP WHILE SITTING AND TALKING TO SOMEONE: WOULD NEVER DOZE
HOW LIKELY ARE YOU TO NOD OFF OR FALL ASLEEP WHILE SITTING QUIETLY AFTER LUNCH WITHOUT ALCOHOL: WOULD NEVER DOZE
DO YOU HAVE DIFFICULTY OPERATING A MOTOR VEHICLE FOR SHORT DISTANCES (LESS THAN 100 MILES) BECAUSE YOU BECOME SLEEPY: NO
ESS TOTAL SCORE: 3
HOW LIKELY ARE YOU TO NOD OFF OR FALL ASLEEP WHILE SITTING INACTIVE IN A PUBLIC PLACE: WOULD NEVER DOZE
HOW LIKELY ARE YOU TO NOD OFF OR FALL ASLEEP WHILE LYING DOWN TO REST IN THE AFTERNOON WHEN CIRCUMSTANCES PERMIT: MODERATE CHANCE OF DOZING
DO YOU HAVE DIFFICULTY BEING AS ACTIVE AS YOU WANT TO BE IN THE EVENING BECAUSE YOU ARE SLEEPY OR TIRED: NO
HOW LIKELY ARE YOU TO NOD OFF OR FALL ASLEEP WHILE SITTING QUIETLY AFTER LUNCH WITHOUT ALCOHOL: WOULD NEVER DOZE
DO YOU GENERALLY HAVE DIFFICULTY REMEMBERING THINGS BECAUSE YOU ARE SLEEPY OR TIRED: NO
HOW LIKELY ARE YOU TO NOD OFF OR FALL ASLEEP WHEN YOU ARE A PASSENGER IN A CAR FOR AN HOUR WITHOUT A BREAK: WOULD NEVER DOZE
HOW LIKELY ARE YOU TO NOD OFF OR FALL ASLEEP WHILE WATCHING TV: SLIGHT CHANCE OF DOZING
HOW LIKELY ARE YOU TO NOD OFF OR FALL ASLEEP IN A CAR, WHILE STOPPED FOR A FEW MINUTES IN TRAFFIC: WOULD NEVER DOZE
HOW LIKELY ARE YOU TO NOD OFF OR FALL ASLEEP WHILE SITTING AND READING: WOULD NEVER DOZE
HOW LIKELY ARE YOU TO NOD OFF OR FALL ASLEEP WHILE WATCHING TV: SLIGHT CHANCE OF DOZING
FOSQ SCORE: 19
DO YOU HAVE DIFFICULTY CONCENTRATING ON THE THINGS YOU DO BECAUSE YOU ARE SLEEPY OR TIRED: NO
DO YOU HAVE DIFFICULTY WATCHING A MOVIE OR VIDEO BECAUSE YOU BECOME SLEEPY OR TIRED: YES, A LITTLE
DO YOU HAVE DIFFICULTY VISITING YOUR FAMILY OR FRIENDS IN THEIR HOME BECAUSE YOU BECOME SLEEPY OR TIRED: NO
HOW LIKELY ARE YOU TO NOD OFF OR FALL ASLEEP WHEN YOU ARE A PASSENGER IN A CAR FOR AN HOUR WITHOUT A BREAK: WOULD NEVER DOZE
HOW LIKELY ARE YOU TO NOD OFF OR FALL ASLEEP IN A CAR, WHILE STOPPED FOR A FEW MINUTES IN TRAFFIC: WOULD NEVER DOZE
HOW LIKELY ARE YOU TO NOD OFF OR FALL ASLEEP WHILE SITTING INACTIVE IN A PUBLIC PLACE: WOULD NEVER DOZE
HOW LIKELY ARE YOU TO NOD OFF OR FALL ASLEEP WHILE SITTING AND TALKING TO SOMEONE: WOULD NEVER DOZE
HOW LIKELY ARE YOU TO NOD OFF OR FALL ASLEEP WHILE SITTING AND READING: WOULD NEVER DOZE
DO YOU HAVE DIFFICULTY OPERATING A MOTOR VEHICLE FOR LONG DISTANCES (GREATER THAN 100 MILES) BECAUSE YOU BECOME SLEEPY: YES, A LITTLE
DO YOU HAVE DIFFICULTY BEING AS ACTIVE AS YOU WANT TO BE IN THE MORNING BECAUSE YOU ARE SLEEPY OR TIRED: NO
HAS YOUR MOOD BEEN AFFECTED BECAUSE YOU ARE SLEEPY OR TIRED: NO
HOW LIKELY ARE YOU TO NOD OFF OR FALL ASLEEP WHILE LYING DOWN TO REST IN THE AFTERNOON WHEN CIRCUMSTANCES PERMIT: MODERATE CHANCE OF DOZING
HAS YOUR RELATIONSHIP WITH FAMILY, FRIENDS OR WORK COLLEAGUES BEEN AFFECTED BECAUSE YOU ARE SLEEPY OR TIRED: NO

## 2025-06-10 ENCOUNTER — OFFICE VISIT (OUTPATIENT)
Age: 50
End: 2025-06-10
Payer: MEDICAID

## 2025-06-10 ENCOUNTER — CLINICAL DOCUMENTATION (OUTPATIENT)
Age: 50
End: 2025-06-10

## 2025-06-10 VITALS
OXYGEN SATURATION: 95 % | DIASTOLIC BLOOD PRESSURE: 75 MMHG | HEART RATE: 85 BPM | HEIGHT: 72 IN | SYSTOLIC BLOOD PRESSURE: 125 MMHG | WEIGHT: 315 LBS | BODY MASS INDEX: 42.66 KG/M2 | TEMPERATURE: 98.2 F

## 2025-06-10 DIAGNOSIS — I10 PRIMARY HYPERTENSION: ICD-10-CM

## 2025-06-10 DIAGNOSIS — G47.33 OSA (OBSTRUCTIVE SLEEP APNEA): Primary | ICD-10-CM

## 2025-06-10 PROCEDURE — 99214 OFFICE O/P EST MOD 30 MIN: CPT | Performed by: NURSE PRACTITIONER

## 2025-06-10 PROCEDURE — 3074F SYST BP LT 130 MM HG: CPT | Performed by: NURSE PRACTITIONER

## 2025-06-10 PROCEDURE — 3078F DIAST BP <80 MM HG: CPT | Performed by: NURSE PRACTITIONER

## 2025-06-10 NOTE — PROGRESS NOTES
[address]  [place] [address]  Tel.  [phone]  Fax. [phone] [address]  [place] [address]  Tel.  [phone]  Fax. [phone] [address] [place] [address]  Tel.  [phone]  Fax. [phone]     S>Queenie Hurley is a 50 y.o. male seen for a positive airway pressure follow-up and evaluation. He was last seen by Dr. Groves on 2/17/2025, prior notes and sleep testing reviewed in detail.  He reported a diagnosis of obstructive sleep apnea in 1990s ([facility]) and treated with CPAP therapy.  He has undergone bariatric surgery in 12/2024 and has lost weight.  He  remained  symptomatic and received a new PAP device 4/4/25. He is very happy with his PAP therapy and new device.      Sleep study HSAT on 3/13/2025 showed AHI  (3%) 15.4 and pAHI (4%) of 9.7 per hour.  SpO2 maria victoria was 86% and SpO2 of < 88% was 0.5 minutes.      He notes he has difficulty sleeping  due to back pain (spinal stenosis, buldging disc) and needing ankle reconstruction surgery. He takes tramadol and flexeril for this.        The following problems are identified:    Drowsiness no Problems exhaling no   Snoring no Forget to put on no   Mask Comfortable yes Can't fall asleep no   Dry Mouth no Mask falls off no   Air Leaking no Frequent awakenings no       He admits that his sleep has improved. Therapy Apnea Index averaged over PAP use in interim: 0.4 /hr which reflects improved sleep breathing condition.     Review of device download indicated (5/4/2025 to 6-25):  Auto pressure: 8-12 cmH2O; Max Pressure: 11.3 cmH2O; 95th Percentile Pressure: 10.8 cmH2O    95th Percentile Leak: 15.8 L/min    % Used Days >= 4 hours: 80%.    Avg hours used: 5 hours 32 minutes.      Allergies   Allergen Reactions    Latex Hives    Guanfacine Other (See Comments)     Blood came out of nose and very dry mouth     Metformin Diarrhea    Pantoprazole Swelling     FEET    Trazodone Other (See Comments)

## 2025-06-10 NOTE — PATIENT INSTRUCTIONS
drowsiness. Medications that impair a drivers attention should have a warning label. Alcohol naturally makes you sleepy and on its own can cause accidents. Combined with excessive drowsiness its effects are amplified.   Signs of Drowsy Driving:   * You don't remember driving the last few miles   * You may drift out of your ara   * You are unable to focus and your thoughts wander   * You may yawn more often than normal   * You have difficulty keeping your eyes open / nodding off   * Missing traffic signs, speeding, or tailgating  Prevention-   Good sleep hygiene, lifestyle and behavioral choices have the most impact on drowsy driving. There is no substitute for sleep and the average person requires 8 hours nightly. If you find yourself driving drowsy, stop and sleep. Consider the sleep hygiene tips provided during your visit as well.     Medication Refill Policy: Refills for all medications require 1 week advance notice. Please have your pharmacy fax a refill request. We are unable to fax, or call in \"controled substance\" medications and you will need to pick these prescriptions up from our office.

## 2025-06-13 ENCOUNTER — TELEMEDICINE (OUTPATIENT)
Age: 50
End: 2025-06-13
Payer: MEDICAID

## 2025-06-13 VITALS
SYSTOLIC BLOOD PRESSURE: 120 MMHG | BODY MASS INDEX: 42.66 KG/M2 | DIASTOLIC BLOOD PRESSURE: 82 MMHG | HEART RATE: 88 BPM | TEMPERATURE: 97.6 F | WEIGHT: 315 LBS | HEIGHT: 72 IN

## 2025-06-13 DIAGNOSIS — E53.8 VITAMIN B12 DEFICIENCY: ICD-10-CM

## 2025-06-13 DIAGNOSIS — D64.9 ANEMIA, UNSPECIFIED TYPE: ICD-10-CM

## 2025-06-13 DIAGNOSIS — E66.01 MORBID OBESITY (HCC): Primary | ICD-10-CM

## 2025-06-13 DIAGNOSIS — K91.2 POSTSURGICAL NONABSORPTION: ICD-10-CM

## 2025-06-13 PROCEDURE — 3074F SYST BP LT 130 MM HG: CPT | Performed by: NURSE PRACTITIONER

## 2025-06-13 PROCEDURE — 99213 OFFICE O/P EST LOW 20 MIN: CPT | Performed by: NURSE PRACTITIONER

## 2025-06-13 PROCEDURE — 3079F DIAST BP 80-89 MM HG: CPT | Performed by: NURSE PRACTITIONER

## 2025-06-13 ASSESSMENT — ENCOUNTER SYMPTOMS
NAUSEA: 0
BACK PAIN: 1
VOMITING: 0
DIARRHEA: 0
ABDOMINAL PAIN: 0
ROS SKIN COMMENTS: ANKLE PAIN
SHORTNESS OF BREATH: 0
CHEST TIGHTNESS: 0

## 2025-06-13 ASSESSMENT — PATIENT HEALTH QUESTIONNAIRE - PHQ9
3. TROUBLE FALLING OR STAYING ASLEEP: NEARLY EVERY DAY
10. IF YOU CHECKED OFF ANY PROBLEMS, HOW DIFFICULT HAVE THESE PROBLEMS MADE IT FOR YOU TO DO YOUR WORK, TAKE CARE OF THINGS AT HOME, OR GET ALONG WITH OTHER PEOPLE: SOMEWHAT DIFFICULT
SUM OF ALL RESPONSES TO PHQ QUESTIONS 1-9: 8
9. THOUGHTS THAT YOU WOULD BE BETTER OFF DEAD, OR OF HURTING YOURSELF: NOT AT ALL
SUM OF ALL RESPONSES TO PHQ QUESTIONS 1-9: 8
7. TROUBLE CONCENTRATING ON THINGS, SUCH AS READING THE NEWSPAPER OR WATCHING TELEVISION: NOT AT ALL
SUM OF ALL RESPONSES TO PHQ QUESTIONS 1-9: 8
2. FEELING DOWN, DEPRESSED OR HOPELESS: SEVERAL DAYS
8. MOVING OR SPEAKING SO SLOWLY THAT OTHER PEOPLE COULD HAVE NOTICED. OR THE OPPOSITE, BEING SO FIGETY OR RESTLESS THAT YOU HAVE BEEN MOVING AROUND A LOT MORE THAN USUAL: NOT AT ALL
6. FEELING BAD ABOUT YOURSELF - OR THAT YOU ARE A FAILURE OR HAVE LET YOURSELF OR YOUR FAMILY DOWN: NOT AT ALL
SUM OF ALL RESPONSES TO PHQ QUESTIONS 1-9: 8
1. LITTLE INTEREST OR PLEASURE IN DOING THINGS: SEVERAL DAYS
4. FEELING TIRED OR HAVING LITTLE ENERGY: NEARLY EVERY DAY
5. POOR APPETITE OR OVEREATING: NOT AT ALL

## 2025-06-13 ASSESSMENT — PAIN SCALES - GENERAL: PAINLEVEL_OUTOF10: 8

## 2025-06-13 NOTE — PROGRESS NOTES
Identified patient with two patient identifiers (name and ). Reviewed chart in preparation for visit and have obtained necessary documentation.    Queenie Hurley is a 50 y.o. male  Chief Complaint   Patient presents with    Weight Management    Follow-up     6 months from 3/4/25 Office Visit     /82   Pulse 88   Temp 97.6 °F (36.4 °C)   Ht 1.829 m (6')   Wt (!) 151.5 kg (334 lb)   BMI 45.30 kg/m²     1. Have you been to the ER, urgent care clinic since your last visit?  Hospitalized since your last visit?no    2. Have you seen or consulted any other health care providers outside of the [facility] since your last visit?  Include any pap smears or colon screening. yes -  ankle specialist Dr. Camacho at [facility].   
capsule, Take 1 capsule by mouth daily, Disp: 90 capsule, Rfl: 3    lisinopril (PRINIVIL;ZESTRIL) 40 MG tablet, Take 1 tablet by mouth daily, Disp: 90 tablet, Rfl: 1    dilTIAZem (CARTIA XT) 240 MG extended release capsule, Take 1 capsule by mouth daily, Disp: 90 capsule, Rfl: 1    DULoxetine (CYMBALTA) 60 MG extended release capsule, Take 1 capsule by mouth daily, Disp: 90 capsule, Rfl: 1    rosuvastatin (CRESTOR) 10 MG tablet, Take 1 tablet by mouth daily, Disp: 90 tablet, Rfl: 1    clobetasol (TEMOVATE) 0.05 % ointment, Apply topically 2 times daily., Disp: 45 g, Rfl: 2    atenolol (TENORMIN) 50 MG tablet, Take 1 tablet by mouth daily, Disp: 90 tablet, Rfl: 1    diclofenac sodium (VOLTAREN) 1 % GEL, Apply 4 g topically 4 times daily, Disp: 100 g, Rfl: 3    polyethylene glycol (GLYCOLAX) 17 GM/SCOOP powder, Take 17 g by mouth 2 times daily, Disp: 3060 g, Rfl: 1    Inulin (FIBER CHOICE PO), Take by mouth daily Fiber Choice gummies. Takes 1 gummie by mouth once daily, Disp: , Rfl:     hydroCHLOROthiazide (HYDRODIURIL) 25 MG tablet, Take 1 tablet by mouth daily, Disp: , Rfl:     Calcium Citrate-Vitamin D (CALCIUM CITRATE + PO), Take by mouth, Disp: , Rfl:     traMADol (ULTRAM) 50 MG tablet, Take 1 tablet by mouth every 6 hours as needed for Pain., Disp: , Rfl:     Multiple Vitamins-Minerals (OPTISOURCE POST BARIATRIC SURG PO), Take by mouth, Disp: , Rfl:     insulin lispro, 1 Unit Dial, (HUMALOG/ADMELOG) 100 UNIT/ML SOPN, INJECT 20 UNITS SUBCUTANEOUSLY BEFORE BREAKFAST, LUNCH, AND DINNER, Disp: 30 mL, Rfl: 3    cyclobenzaprine (FLEXERIL) 10 MG tablet, Take 0.5 tablets by mouth 3 times daily as needed for Muscle spasms, Disp: , Rfl:     Continuous Glucose Sensor (FREESTYLE DEREK 3 SENSOR) MISC, Use as directed to monitor blood glucose, Disp: 6 each, Rfl: 3    pregabalin (LYRICA) 75 MG capsule, Take 2 capsules by mouth daily., Disp: , Rfl:     vitamin D (ERGOCALCIFEROL) 1.25 MG (97417 UT) CAPS capsule, Take 1 capsule

## 2025-06-14 LAB
FOLATE SERPL-MCNC: 15.9 NG/ML
IRON SERPL-MCNC: 52 UG/DL (ref 38–169)
VIT B12 SERPL-MCNC: 1339 PG/ML (ref 232–1245)

## 2025-06-18 DIAGNOSIS — E11.65 TYPE 2 DIABETES MELLITUS WITH HYPERGLYCEMIA, WITH LONG-TERM CURRENT USE OF INSULIN (HCC): ICD-10-CM

## 2025-06-18 DIAGNOSIS — Z79.4 TYPE 2 DIABETES MELLITUS WITH HYPERGLYCEMIA, WITH LONG-TERM CURRENT USE OF INSULIN (HCC): ICD-10-CM

## 2025-06-20 ENCOUNTER — RESULTS FOLLOW-UP (OUTPATIENT)
Age: 50
End: 2025-06-20

## 2025-06-29 DIAGNOSIS — Z79.4 TYPE 2 DIABETES MELLITUS WITH HYPERGLYCEMIA, WITH LONG-TERM CURRENT USE OF INSULIN (HCC): ICD-10-CM

## 2025-06-29 DIAGNOSIS — E11.65 TYPE 2 DIABETES MELLITUS WITH HYPERGLYCEMIA, WITH LONG-TERM CURRENT USE OF INSULIN (HCC): ICD-10-CM

## 2025-06-30 RX ORDER — PEN NEEDLE, DIABETIC 32 GX 1/4"
NEEDLE, DISPOSABLE MISCELLANEOUS
Qty: 100 EACH | Refills: 0 | Status: SHIPPED | OUTPATIENT
Start: 2025-06-30

## 2025-07-18 DIAGNOSIS — E11.65 TYPE 2 DIABETES MELLITUS WITH HYPERGLYCEMIA, WITH LONG-TERM CURRENT USE OF INSULIN (HCC): ICD-10-CM

## 2025-07-18 DIAGNOSIS — Z79.4 TYPE 2 DIABETES MELLITUS WITH HYPERGLYCEMIA, WITH LONG-TERM CURRENT USE OF INSULIN (HCC): ICD-10-CM

## 2025-07-30 DIAGNOSIS — Z79.4 TYPE 2 DIABETES MELLITUS WITH HYPERGLYCEMIA, WITH LONG-TERM CURRENT USE OF INSULIN (HCC): ICD-10-CM

## 2025-07-30 DIAGNOSIS — E11.65 TYPE 2 DIABETES MELLITUS WITH HYPERGLYCEMIA, WITH LONG-TERM CURRENT USE OF INSULIN (HCC): ICD-10-CM

## 2025-07-30 RX ORDER — PEN NEEDLE, DIABETIC 32 GX 1/4"
NEEDLE, DISPOSABLE MISCELLANEOUS
Qty: 100 EACH | Refills: 0 | Status: SHIPPED | OUTPATIENT
Start: 2025-07-30

## 2025-08-15 DIAGNOSIS — L20.82 FLEXURAL ECZEMA: ICD-10-CM

## 2025-08-18 RX ORDER — CLOBETASOL PROPIONATE 0.5 MG/G
OINTMENT TOPICAL
Qty: 45 G | Refills: 0 | Status: SHIPPED | OUTPATIENT
Start: 2025-08-18

## 2025-08-20 ENCOUNTER — COMMUNITY OUTREACH (OUTPATIENT)
Dept: PRIMARY CARE CLINIC | Facility: CLINIC | Age: 50
End: 2025-08-20

## 2025-08-26 DIAGNOSIS — E11.65 TYPE 2 DIABETES MELLITUS WITH HYPERGLYCEMIA, WITH LONG-TERM CURRENT USE OF INSULIN (HCC): ICD-10-CM

## 2025-08-26 DIAGNOSIS — Z79.4 TYPE 2 DIABETES MELLITUS WITH HYPERGLYCEMIA, WITH LONG-TERM CURRENT USE OF INSULIN (HCC): ICD-10-CM

## (undated) DEVICE — SUTURE ABSORBABLE MONOFILAMENT 3-0 SH 27 IN VIO PDS + PDP316H

## (undated) DEVICE — ELECTRO LUBE IS A SINGLE PATIENT USE DEVICE THAT IS INTENDED TO BE USED ON ELECTROSURGICAL ELECTRODES TO REDUCE STICKING.: Brand: KEY SURGICAL ELECTRO LUBE

## (undated) DEVICE — X-RAY DETECTABLE SPONGES,16 PLY: Brand: VISTEC

## (undated) DEVICE — Device

## (undated) DEVICE — ARM DRAPE

## (undated) DEVICE — AIR SHEET,LAT,COMFORT GLIDE, BLEND 40X80: Brand: MEDLINE

## (undated) DEVICE — SHEET TRNSF DISPOSABLE HOVERMATT 100 PER CA

## (undated) DEVICE — TIP COVER ACCESSORY

## (undated) DEVICE — VISIGI 3D®  CALIBRATION SYSTEM  SIZE 40FR STD W/ BULB: Brand: BOEHRINGER® VISIGI 3D™ SLEEVE GASTRECTOMY CALIBRATION SYSTEM, SIZE 40FR W/BULB

## (undated) DEVICE — STAPLER 60 RELOAD BLUE: Brand: SUREFORM

## (undated) DEVICE — SEAL

## (undated) DEVICE — SYSTEM EVAC SMOKE LAPARSCOPIC

## (undated) DEVICE — PACK,BASIC,SIRUS,V: Brand: MEDLINE

## (undated) DEVICE — ENDO CARRY-ON PROCEDURE KIT INCLUDES ENZYMATIC SPONGE, GAUZE, BIOHAZARD LABEL, TRAY, LUBRICANT, DIRTY SCOPE LABEL, WATER LABEL, TRAY, DRAWSTRING PAD, AND DEFENDO 4-PIECE KIT.: Brand: ENDO CARRY-ON PROCEDURE KIT

## (undated) DEVICE — LIQUIBAND RAPID ADHESIVE 36/CS 0.8ML: Brand: MEDLINE

## (undated) DEVICE — STAPLER 60 RELOAD WHITE: Brand: SUREFORM

## (undated) DEVICE — SYRINGE MED 10ML LUERLOCK TIP W/O SFTY DISP

## (undated) DEVICE — STAPLER SKIN LN REINF 60 MM ECHELON ENDOPATH

## (undated) DEVICE — EVACUATOR SURG 100CC SIL BLB SUCT RESVR FOR CLS WND DRNGE

## (undated) DEVICE — GENERAL LAPAROSCOPY - SMH: Brand: MEDLINE INDUSTRIES, INC.

## (undated) DEVICE — SUTURE MONOCRYL + SZ 4-0 L27IN ABSRB UD L19MM PS-2 3/8 CIR MCP426H

## (undated) DEVICE — VESSEL SEALER EXTEND: Brand: ENDOWRIST

## (undated) DEVICE — TROCAR ENDOSCP L100MM DIA5MM BLDELSS STBL SL THRD OPT VW

## (undated) DEVICE — SUTURE MONOCRYL ABSORBABLE L 6 IN SZ 3-0 POLYGLCOLIC ACD MONOFILAMENT SH

## (undated) DEVICE — DRAIN SURG 19FR 100% SIL END PERF RND NO RESVR TB W/ TRCR

## (undated) DEVICE — STAPLER 60: Brand: SUREFORM

## (undated) DEVICE — SOLUTION IV 1000ML 0.9% SOD CHL PH 5 INJ USP VIAFLX PLAS

## (undated) DEVICE — SUTURE NONABSORBABLE MONOFILAMENT 2-0 V-20 6 IN V-LOC PBT VLOCN0605

## (undated) DEVICE — BLADELESS OBTURATOR: Brand: WECK VISTA

## (undated) DEVICE — SYRINGE MED 30ML STD CLR PLAS LUERLOCK TIP N CTRL DISP

## (undated) DEVICE — DEVICE SUT FOR TRCR SITE INCIS ENDO CLOSE

## (undated) DEVICE — SUTURE VICRYL + SZ 0 L27IN ABSRB VLT L26MM UR-6 5/8 CIR VCP603H

## (undated) DEVICE — GARMENT,MEDLINE,DVT,INT,CALF,MED, GEN2: Brand: MEDLINE

## (undated) DEVICE — SOLUTION ANTIFOG VIS SYS CLEARIFY LAPSCP